# Patient Record
Sex: FEMALE | Race: BLACK OR AFRICAN AMERICAN | NOT HISPANIC OR LATINO | URBAN - METROPOLITAN AREA
[De-identification: names, ages, dates, MRNs, and addresses within clinical notes are randomized per-mention and may not be internally consistent; named-entity substitution may affect disease eponyms.]

---

## 2018-08-21 ENCOUNTER — EMERGENCY (EMERGENCY)
Facility: HOSPITAL | Age: 73
LOS: 1 days | Discharge: HOME | End: 2018-08-21
Attending: EMERGENCY MEDICINE | Admitting: EMERGENCY MEDICINE
Payer: MEDICAID

## 2018-08-21 VITALS
SYSTOLIC BLOOD PRESSURE: 195 MMHG | DIASTOLIC BLOOD PRESSURE: 87 MMHG | OXYGEN SATURATION: 98 % | WEIGHT: 123.24 LBS | RESPIRATION RATE: 16 BRPM | HEART RATE: 60 BPM | TEMPERATURE: 98 F

## 2018-08-21 LAB
ALBUMIN SERPL ELPH-MCNC: 4.4 G/DL — SIGNIFICANT CHANGE UP (ref 3.3–5)
ALP SERPL-CCNC: 79 U/L — SIGNIFICANT CHANGE UP (ref 40–120)
ALT FLD-CCNC: 8 U/L — LOW (ref 10–45)
ANION GAP SERPL CALC-SCNC: 14 MMOL/L — SIGNIFICANT CHANGE UP (ref 5–17)
AST SERPL-CCNC: 20 U/L — SIGNIFICANT CHANGE UP (ref 10–40)
BASOPHILS NFR BLD AUTO: 0.2 % — SIGNIFICANT CHANGE UP (ref 0–2)
BILIRUB SERPL-MCNC: 0.2 MG/DL — SIGNIFICANT CHANGE UP (ref 0.2–1.2)
BUN SERPL-MCNC: 39 MG/DL — HIGH (ref 7–23)
CALCIUM SERPL-MCNC: 10 MG/DL — SIGNIFICANT CHANGE UP (ref 8.4–10.5)
CHLORIDE SERPL-SCNC: 105 MMOL/L — SIGNIFICANT CHANGE UP (ref 96–108)
CK MB CFR SERPL CALC: 2 NG/ML — SIGNIFICANT CHANGE UP (ref 0–6.7)
CK SERPL-CCNC: 126 U/L — SIGNIFICANT CHANGE UP (ref 25–170)
CO2 SERPL-SCNC: 22 MMOL/L — SIGNIFICANT CHANGE UP (ref 22–31)
CREAT SERPL-MCNC: 1.26 MG/DL — SIGNIFICANT CHANGE UP (ref 0.5–1.3)
EOSINOPHIL NFR BLD AUTO: 0.2 % — SIGNIFICANT CHANGE UP (ref 0–6)
GLUCOSE SERPL-MCNC: 133 MG/DL — HIGH (ref 70–99)
HCT VFR BLD CALC: 32 % — LOW (ref 34.5–45)
HGB BLD-MCNC: 10.2 G/DL — LOW (ref 11.5–15.5)
LYMPHOCYTES # BLD AUTO: 11.4 % — LOW (ref 13–44)
MCHC RBC-ENTMCNC: 28.4 PG — SIGNIFICANT CHANGE UP (ref 27–34)
MCHC RBC-ENTMCNC: 31.9 G/DL — LOW (ref 32–36)
MCV RBC AUTO: 89.1 FL — SIGNIFICANT CHANGE UP (ref 80–100)
MONOCYTES NFR BLD AUTO: 4.2 % — SIGNIFICANT CHANGE UP (ref 2–14)
NEUTROPHILS NFR BLD AUTO: 84 % — HIGH (ref 43–77)
PLATELET # BLD AUTO: 226 K/UL — SIGNIFICANT CHANGE UP (ref 150–400)
POTASSIUM SERPL-MCNC: 4.3 MMOL/L — SIGNIFICANT CHANGE UP (ref 3.5–5.3)
POTASSIUM SERPL-SCNC: 4.3 MMOL/L — SIGNIFICANT CHANGE UP (ref 3.5–5.3)
PROT SERPL-MCNC: 8.2 G/DL — SIGNIFICANT CHANGE UP (ref 6–8.3)
RBC # BLD: 3.59 M/UL — LOW (ref 3.8–5.2)
RBC # FLD: 13.5 % — SIGNIFICANT CHANGE UP (ref 10.3–16.9)
SODIUM SERPL-SCNC: 141 MMOL/L — SIGNIFICANT CHANGE UP (ref 135–145)
TROPONIN T SERPL-MCNC: <0.01 NG/ML — SIGNIFICANT CHANGE UP (ref 0–0.01)
WBC # BLD: 6.4 K/UL — SIGNIFICANT CHANGE UP (ref 3.8–10.5)
WBC # FLD AUTO: 6.4 K/UL — SIGNIFICANT CHANGE UP (ref 3.8–10.5)

## 2018-08-21 PROCEDURE — 99284 EMERGENCY DEPT VISIT MOD MDM: CPT | Mod: GC,25

## 2018-08-21 PROCEDURE — 93010 ELECTROCARDIOGRAM REPORT: CPT

## 2018-08-21 NOTE — ED ADULT NURSE NOTE - OBJECTIVE STATEMENT
pt recewived into [facility] arrives via walk in triage with family A&Ox3 ambulatory appear comfortable complaining of nausea head pain and left knee pain s/p syncopal episode this afternoon around 4pm pt was in the kitchen and fell forward hit the front of her head does not remember whole event but cannot say for sure the lost consciousness. Family member states she heard the thud and found patient on the floor. Pt denies CP SOB at this time but reports nausea with episode fo vomiting prior to event. 12 lead EKG done NSR noted to continuous cardiac monitor respirations appear even and unlabored 20G placed to LAC labs drawn and sent will monitor and reassess, pt in NAD awaiting Md farmer informed and agreeable to plan

## 2018-08-21 NOTE — ED ADULT TRIAGE NOTE - ARRIVAL INFO ADDITIONAL COMMENTS
Pt c/o a syncopal episode and emesis. Pt c/o left elbow pain, left knee pain, and left forehead pain after fall. Denies anticoags and was found by family member face down in her kitchen. Pt states she had a pain in her stomach prior to waking up on her kitchen floor. She denies chest pain. Pt verbalized she recently was started on Losartan 50mg daily and today was the second time she had taken the medication. Pt states the first time she took it she felt diaphoretic and light headed.

## 2018-08-22 VITALS
HEART RATE: 65 BPM | OXYGEN SATURATION: 98 % | SYSTOLIC BLOOD PRESSURE: 159 MMHG | RESPIRATION RATE: 16 BRPM | TEMPERATURE: 98 F | DIASTOLIC BLOOD PRESSURE: 76 MMHG

## 2018-08-22 DIAGNOSIS — Z90.10 ACQUIRED ABSENCE OF UNSPECIFIED BREAST AND NIPPLE: Chronic | ICD-10-CM

## 2018-08-22 PROCEDURE — 93005 ELECTROCARDIOGRAM TRACING: CPT

## 2018-08-22 PROCEDURE — 84484 ASSAY OF TROPONIN QUANT: CPT

## 2018-08-22 PROCEDURE — 85025 COMPLETE CBC W/AUTO DIFF WBC: CPT

## 2018-08-22 PROCEDURE — 80053 COMPREHEN METABOLIC PANEL: CPT

## 2018-08-22 PROCEDURE — 82550 ASSAY OF CK (CPK): CPT

## 2018-08-22 PROCEDURE — 82553 CREATINE MB FRACTION: CPT

## 2018-08-22 PROCEDURE — 70450 CT HEAD/BRAIN W/O DYE: CPT

## 2018-08-22 PROCEDURE — 99284 EMERGENCY DEPT VISIT MOD MDM: CPT | Mod: 25

## 2018-08-22 PROCEDURE — 36415 COLL VENOUS BLD VENIPUNCTURE: CPT

## 2018-08-22 PROCEDURE — 70450 CT HEAD/BRAIN W/O DYE: CPT | Mod: 26

## 2018-08-22 RX ORDER — GLIMEPIRIDE 1 MG
1 TABLET ORAL
Qty: 0 | Refills: 0 | COMMUNITY

## 2018-08-22 RX ORDER — METFORMIN HYDROCHLORIDE 850 MG/1
0 TABLET ORAL
Qty: 0 | Refills: 0 | COMMUNITY

## 2018-08-22 RX ORDER — SIMVASTATIN 20 MG/1
1 TABLET, FILM COATED ORAL
Qty: 0 | Refills: 0 | COMMUNITY

## 2018-08-22 RX ORDER — LOSARTAN POTASSIUM 100 MG/1
1 TABLET, FILM COATED ORAL
Qty: 0 | Refills: 0 | COMMUNITY

## 2018-08-22 RX ORDER — ACETAMINOPHEN 500 MG
650 TABLET ORAL ONCE
Qty: 0 | Refills: 0 | Status: COMPLETED | OUTPATIENT
Start: 2018-08-22 | End: 2018-08-22

## 2018-08-22 RX ORDER — LOSARTAN POTASSIUM 100 MG/1
0 TABLET, FILM COATED ORAL
Qty: 0 | Refills: 0 | COMMUNITY

## 2018-08-22 RX ADMIN — Medication 650 MILLIGRAM(S): at 00:57

## 2018-08-22 NOTE — ED PROVIDER NOTE - OBJECTIVE STATEMENT
72 yo female with hx of HTN, DM II, HLD who presents for evaluation of syncopal episode today at 4 PM. Patient states that she was in the kitchen when she had sudden onset of nausea and vomited once. Her next memory is of being on the floor and being told by her niece that she had lost consciousness and fallen. She had no seizure like activity, chest pain, difficulty breathing, palpitations or weakness associated with her fall. No post ictal state following her syncopal episode. Patient and family attribute her nausea and vomiting to leftover fish which she had for lunch. No hx of MI, stroke or TIA.

## 2018-08-22 NOTE — ED PROVIDER NOTE - PHYSICAL EXAMINATION
PHYSICAL EXAM:  Constitutional: NAD, well-groomed, well-developed  HEENT: PERRLA, EOMI, Normal Hearing, MMM. Dentures in place  Neck: No LAD, No JVD. No audible carotid bruit  Back: Normal spine flexure, No CVA tenderness  Respiratory: CTAB  Cardiovascular: S1 and S2, RRR, no M/G/R  Gastrointestinal: BS+, soft, NT/ND  Extremities: No peripheral edema  Vascular: 2+ peripheral pulses  Neurological: A/O x 3, CN 2-12 intact. Sensation intact throughout. Motor 5/5 proximal and distal upper extremities b/l. Motor 5/5 proximal and distal lower extremities b/l. Cerebellar testing wnl. Gait not assessed. Word recall intact. Naming intact. Follows complex commands appropriately. No asphasia.   Skin: No rashes

## 2018-08-22 NOTE — ED PROVIDER NOTE - ATTENDING CONTRIBUTION TO CARE
syncope episode preceeded by gi symptoms of nausea/ crampy abd pain.  gi symptoms occurred after eating old fish.  suspect enteritis.  now asymptomatic in ed.  ecg nsr.  labs wnl.  ct head done as she may have hit head and no acute pathology seen.  unlikely acs given neg troponin and normal ecg, lack of chest pain.  to f/u with pmd and return if symptoms worsen

## 2018-08-22 NOTE — ED PROVIDER NOTE - MEDICAL DECISION MAKING DETAILS
Discussed with patient that likely etiology of her syncopal episode was her GI discomfort and vomiting episode. No alarm signs on exam or history to point to cardiac or neurologic causes of her syncope. At this time patient's neurologic exam is intact and EKG w/ cardiac enzymes are wnl. Patient to have CT head given head injury during fall but anticipate that patient will likely be discharged home to follow up with her outpatient PCP.

## 2018-08-22 NOTE — ED PROVIDER NOTE - CARE PLAN
Principal Discharge DX:	Syncope, unspecified syncope type  Assessment and plan of treatment:	You presented to the emergency room after losing consciousness. This is most likely due to your vomiting and abdominal discomfort. You have improved since reaching the hospital. We performed a head CT to take xrays of your head and ensure there was no damage from your fall.  Secondary Diagnosis:	Essential hypertension Principal Discharge DX:	Syncope, unspecified syncope type  Assessment and plan of treatment:	You presented to the emergency room after losing consciousness. This is most likely due to your vomiting and abdominal discomfort. You have improved since reaching the hospital. We performed a head CT to take xrays of your head and ensure there was no damage from your fall. Please see your primary care doctor after you are discharged from the hospital.  Secondary Diagnosis:	Essential hypertension Principal Discharge DX:	Syncope, unspecified syncope type  Assessment and plan of treatment:	You presented to the emergency room after losing consciousness. This is most likely due to your vomiting and abdominal discomfort. You have improved since reaching the hospital. We performed a head CT to take xrays of your head and ensure there was no damage from your fall. No bleeding was seen on your head CT. Please see your primary care doctor after you are discharged from the hospital.  Secondary Diagnosis:	Essential hypertension

## 2018-08-22 NOTE — ED PROVIDER NOTE - PLAN OF CARE
You presented to the emergency room after losing consciousness. This is most likely due to your vomiting and abdominal discomfort. You have improved since reaching the hospital. We performed a head CT to take xrays of your head and ensure there was no damage from your fall. You presented to the emergency room after losing consciousness. This is most likely due to your vomiting and abdominal discomfort. You have improved since reaching the hospital. We performed a head CT to take xrays of your head and ensure there was no damage from your fall. Please see your primary care doctor after you are discharged from the hospital. You presented to the emergency room after losing consciousness. This is most likely due to your vomiting and abdominal discomfort. You have improved since reaching the hospital. We performed a head CT to take xrays of your head and ensure there was no damage from your fall. No bleeding was seen on your head CT. Please see your primary care doctor after you are discharged from the hospital.

## 2018-08-22 NOTE — ED PROVIDER NOTE - NS ED ROS FT
REVIEW OF SYSTEMS:    CONSTITUTIONAL: No weakness, fevers or chills  EYES/ENT: No visual changes;  No vertigo or throat pain   NECK: No pain or stiffness  RESPIRATORY: No cough, wheezing, hemoptysis; No shortness of breath  CARDIOVASCULAR: No chest pain or palpitations  GASTROINTESTINAL: + for nausea and NBNB vomiting x1. No abdominal or epigastric pain. No diarrhea or constipation. No melena or hematochezia.  GENITOURINARY: No dysuria, frequency or hematuria  NEUROLOGICAL: per HPI  SKIN: No itching, rashes

## 2018-08-25 DIAGNOSIS — E11.9 TYPE 2 DIABETES MELLITUS WITHOUT COMPLICATIONS: ICD-10-CM

## 2018-08-25 DIAGNOSIS — I10 ESSENTIAL (PRIMARY) HYPERTENSION: ICD-10-CM

## 2018-08-25 DIAGNOSIS — Z79.899 OTHER LONG TERM (CURRENT) DRUG THERAPY: ICD-10-CM

## 2018-08-25 DIAGNOSIS — E78.5 HYPERLIPIDEMIA, UNSPECIFIED: ICD-10-CM

## 2018-08-25 DIAGNOSIS — Z79.84 LONG TERM (CURRENT) USE OF ORAL HYPOGLYCEMIC DRUGS: ICD-10-CM

## 2018-08-25 DIAGNOSIS — R55 SYNCOPE AND COLLAPSE: ICD-10-CM

## 2020-04-14 ENCOUNTER — HOSPITAL ENCOUNTER (INPATIENT)
Facility: HOSPITAL | Age: 75
LOS: 8 days | Discharge: HOME HEALTH | DRG: 177 | End: 2020-04-22
Attending: EMERGENCY MEDICINE | Admitting: STUDENT IN AN ORGANIZED HEALTH CARE EDUCATION/TRAINING PROGRAM
Payer: MEDICARE

## 2020-04-14 ENCOUNTER — APPOINTMENT (OUTPATIENT)
Dept: CT IMAGING | Facility: HOSPITAL | Age: 75
DRG: 177 | End: 2020-04-14
Payer: MEDICARE

## 2020-04-14 ENCOUNTER — APPOINTMENT (OUTPATIENT)
Dept: RADIOLOGY | Facility: HOSPITAL | Age: 75
DRG: 177 | End: 2020-04-14
Payer: MEDICARE

## 2020-04-14 ENCOUNTER — APPOINTMENT (EMERGENCY)
Dept: CT IMAGING | Facility: HOSPITAL | Age: 75
DRG: 177 | End: 2020-04-14
Payer: MEDICARE

## 2020-04-14 DIAGNOSIS — K08.89 PAIN, DENTAL: ICD-10-CM

## 2020-04-14 DIAGNOSIS — U07.1 COVID-19 VIRUS DETECTED: ICD-10-CM

## 2020-04-14 DIAGNOSIS — R50.9 FEVER: ICD-10-CM

## 2020-04-14 DIAGNOSIS — K04.7 TOOTH ABSCESS: ICD-10-CM

## 2020-04-14 DIAGNOSIS — R41.82 ALTERED MENTAL STATUS: Primary | ICD-10-CM

## 2020-04-14 DIAGNOSIS — N17.9 AKI (ACUTE KIDNEY INJURY) (HCC): ICD-10-CM

## 2020-04-14 PROBLEM — E11.9 DM (DIABETES MELLITUS) (HCC): Status: ACTIVE | Noted: 2020-04-14

## 2020-04-14 PROBLEM — G93.40 ENCEPHALOPATHY: Status: ACTIVE | Noted: 2020-04-14

## 2020-04-14 PROBLEM — I10 HTN (HYPERTENSION): Status: ACTIVE | Noted: 2020-04-14

## 2020-04-14 PROBLEM — R93.89 ABNORMAL FINDING ON CT SCAN: Status: ACTIVE | Noted: 2020-04-14

## 2020-04-14 LAB
ANION GAP SERPL CALCULATED.3IONS-SCNC: 14 MMOL/L (ref 4–13)
BACTERIA UR QL AUTO: ABNORMAL /HPF
BASOPHILS # BLD AUTO: 0.01 THOUSANDS/ΜL (ref 0–0.1)
BASOPHILS NFR BLD AUTO: 0 % (ref 0–1)
BILIRUB UR QL STRIP: NEGATIVE
BUN SERPL-MCNC: 56 MG/DL (ref 5–25)
CALCIUM SERPL-MCNC: 8.8 MG/DL (ref 8.3–10.1)
CHLORIDE SERPL-SCNC: 103 MMOL/L (ref 100–108)
CLARITY UR: CLEAR
CO2 SERPL-SCNC: 22 MMOL/L (ref 21–32)
COLOR UR: YELLOW
CREAT SERPL-MCNC: 1.72 MG/DL (ref 0.6–1.3)
CRP SERPL QL: 59.9 MG/L
EOSINOPHIL # BLD AUTO: 0 THOUSAND/ΜL (ref 0–0.61)
EOSINOPHIL NFR BLD AUTO: 0 % (ref 0–6)
ERYTHROCYTE [DISTWIDTH] IN BLOOD BY AUTOMATED COUNT: 13.7 % (ref 11.6–15.1)
GFR SERPL CREATININE-BSD FRML MDRD: 34 ML/MIN/1.73SQ M
GLUCOSE SERPL-MCNC: 218 MG/DL (ref 65–140)
GLUCOSE UR STRIP-MCNC: NEGATIVE MG/DL
HCT VFR BLD AUTO: 34.9 % (ref 34.8–46.1)
HGB BLD-MCNC: 10.4 G/DL (ref 11.5–15.4)
HGB UR QL STRIP.AUTO: ABNORMAL
IMM GRANULOCYTES # BLD AUTO: 0.02 THOUSAND/UL (ref 0–0.2)
IMM GRANULOCYTES NFR BLD AUTO: 0 % (ref 0–2)
KETONES UR STRIP-MCNC: NEGATIVE MG/DL
LACTATE SERPL-SCNC: 1.2 MMOL/L (ref 0.5–2)
LEUKOCYTE ESTERASE UR QL STRIP: NEGATIVE
LYMPHOCYTES # BLD AUTO: 0.76 THOUSANDS/ΜL (ref 0.6–4.47)
LYMPHOCYTES NFR BLD AUTO: 15 % (ref 14–44)
MCH RBC QN AUTO: 27.8 PG (ref 26.8–34.3)
MCHC RBC AUTO-ENTMCNC: 29.8 G/DL (ref 31.4–37.4)
MCV RBC AUTO: 93 FL (ref 82–98)
MONOCYTES # BLD AUTO: 0.33 THOUSAND/ΜL (ref 0.17–1.22)
MONOCYTES NFR BLD AUTO: 6 % (ref 4–12)
NEUTROPHILS # BLD AUTO: 4.01 THOUSANDS/ΜL (ref 1.85–7.62)
NEUTS SEG NFR BLD AUTO: 79 % (ref 43–75)
NITRITE UR QL STRIP: NEGATIVE
NON-SQ EPI CELLS URNS QL MICRO: ABNORMAL /HPF
NRBC BLD AUTO-RTO: 0 /100 WBCS
PH UR STRIP.AUTO: 5.5 [PH]
PLATELET # BLD AUTO: 168 THOUSANDS/UL (ref 149–390)
PMV BLD AUTO: 11.2 FL (ref 8.9–12.7)
POTASSIUM SERPL-SCNC: 5.3 MMOL/L (ref 3.5–5.3)
PROT UR STRIP-MCNC: ABNORMAL MG/DL
RBC # BLD AUTO: 3.74 MILLION/UL (ref 3.81–5.12)
RBC #/AREA URNS AUTO: ABNORMAL /HPF
SODIUM SERPL-SCNC: 139 MMOL/L (ref 136–145)
SP GR UR STRIP.AUTO: >=1.03 (ref 1–1.03)
UROBILINOGEN UR QL STRIP.AUTO: 0.2 E.U./DL
WBC # BLD AUTO: 5.13 THOUSAND/UL (ref 4.31–10.16)
WBC #/AREA URNS AUTO: ABNORMAL /HPF

## 2020-04-14 PROCEDURE — 82728 ASSAY OF FERRITIN: CPT | Performed by: STUDENT IN AN ORGANIZED HEALTH CARE EDUCATION/TRAINING PROGRAM

## 2020-04-14 PROCEDURE — 96365 THER/PROPH/DIAG IV INF INIT: CPT

## 2020-04-14 PROCEDURE — 96361 HYDRATE IV INFUSION ADD-ON: CPT

## 2020-04-14 PROCEDURE — 83605 ASSAY OF LACTIC ACID: CPT | Performed by: PHYSICIAN ASSISTANT

## 2020-04-14 PROCEDURE — 99285 EMERGENCY DEPT VISIT HI MDM: CPT | Performed by: PHYSICIAN ASSISTANT

## 2020-04-14 PROCEDURE — 84145 PROCALCITONIN (PCT): CPT | Performed by: STUDENT IN AN ORGANIZED HEALTH CARE EDUCATION/TRAINING PROGRAM

## 2020-04-14 PROCEDURE — 99285 EMERGENCY DEPT VISIT HI MDM: CPT

## 2020-04-14 PROCEDURE — 36415 COLL VENOUS BLD VENIPUNCTURE: CPT | Performed by: PHYSICIAN ASSISTANT

## 2020-04-14 PROCEDURE — 99223 1ST HOSP IP/OBS HIGH 75: CPT | Performed by: STUDENT IN AN ORGANIZED HEALTH CARE EDUCATION/TRAINING PROGRAM

## 2020-04-14 PROCEDURE — 87040 BLOOD CULTURE FOR BACTERIA: CPT | Performed by: PHYSICIAN ASSISTANT

## 2020-04-14 PROCEDURE — 81001 URINALYSIS AUTO W/SCOPE: CPT | Performed by: PHYSICIAN ASSISTANT

## 2020-04-14 PROCEDURE — 70486 CT MAXILLOFACIAL W/O DYE: CPT

## 2020-04-14 PROCEDURE — 86140 C-REACTIVE PROTEIN: CPT | Performed by: STUDENT IN AN ORGANIZED HEALTH CARE EDUCATION/TRAINING PROGRAM

## 2020-04-14 PROCEDURE — 70450 CT HEAD/BRAIN W/O DYE: CPT

## 2020-04-14 PROCEDURE — 85025 COMPLETE CBC W/AUTO DIFF WBC: CPT | Performed by: PHYSICIAN ASSISTANT

## 2020-04-14 PROCEDURE — 71045 X-RAY EXAM CHEST 1 VIEW: CPT

## 2020-04-14 PROCEDURE — 80048 BASIC METABOLIC PNL TOTAL CA: CPT | Performed by: PHYSICIAN ASSISTANT

## 2020-04-14 PROCEDURE — 74176 CT ABD & PELVIS W/O CONTRAST: CPT

## 2020-04-14 PROCEDURE — 71250 CT THORAX DX C-: CPT

## 2020-04-14 RX ORDER — ACETAMINOPHEN 650 MG/1
325 SUPPOSITORY RECTAL EVERY 4 HOURS PRN
Status: DISCONTINUED | OUTPATIENT
Start: 2020-04-14 | End: 2020-04-15

## 2020-04-14 RX ORDER — ACETAMINOPHEN 160 MG/5ML
650 SUSPENSION, ORAL (FINAL DOSE FORM) ORAL ONCE
Status: DISCONTINUED | OUTPATIENT
Start: 2020-04-14 | End: 2020-04-16

## 2020-04-14 RX ORDER — CLINDAMYCIN PHOSPHATE 600 MG/50ML
600 INJECTION INTRAVENOUS ONCE
Status: COMPLETED | OUTPATIENT
Start: 2020-04-14 | End: 2020-04-14

## 2020-04-14 RX ORDER — SODIUM CHLORIDE 9 MG/ML
75 INJECTION, SOLUTION INTRAVENOUS CONTINUOUS
Status: DISCONTINUED | OUTPATIENT
Start: 2020-04-14 | End: 2020-04-17

## 2020-04-14 RX ADMIN — ACETAMINOPHEN 325 MG: 650 SUPPOSITORY RECTAL at 21:38

## 2020-04-14 RX ADMIN — SODIUM CHLORIDE 1000 ML: 0.9 INJECTION, SOLUTION INTRAVENOUS at 11:27

## 2020-04-14 RX ADMIN — SODIUM CHLORIDE 100 ML/HR: 0.9 INJECTION, SOLUTION INTRAVENOUS at 23:29

## 2020-04-14 RX ADMIN — SODIUM CHLORIDE 500 ML: 0.9 INJECTION, SOLUTION INTRAVENOUS at 17:49

## 2020-04-14 RX ADMIN — CLINDAMYCIN PHOSPHATE 600 MG: 600 INJECTION, SOLUTION INTRAVENOUS at 11:27

## 2020-04-14 RX ADMIN — ACETAMINOPHEN 325 MG: 325 SUPPOSITORY RECTAL at 17:44

## 2020-04-14 RX ADMIN — CEFEPIME HYDROCHLORIDE 2000 MG: 2 INJECTION, POWDER, FOR SOLUTION INTRAVENOUS at 23:29

## 2020-04-14 NOTE — ED NOTES
1  CC: poor nutritional intake   2  Orientation status: Disoriented X3  3  Abnormal labs/vitals/assessment: fever; BUN 56; Creatinine 1 72  4  Iv/drains/etc : 20 G left AC   5  Last time narcotics given: none   6  Medications/drips: NSS   7  Ambulation status: assist x2   8  Isolation status: none   9  Skin: not fully assessed   10  Trauma: none   11   ED phone number: [phone], RN  04/14/20 Marcela Rodas

## 2020-04-14 NOTE — ED PROVIDER NOTES
History  Chief Complaint   Patient presents with    Poor Nutritional Intake     PT C/O DENTAL PAIN AND THROAT PAIN BUT HAS NOT BEEN EATING OR DRINKING FOR THE PAST COUPLE DAYS  FAMILY MEMBER IS CONCERNED ABOUT DEHYDRATION  AMBULATORY PULSE OX 93-94%     Manuel Barksdale is a 80yo female presenting to the ED with daughter for evaluation of worsening dental pain and poor PO intake x 3 days  PMHx significant for type II DM and anemia  Patient's daughter relates the patient lives in the [place] but has been living with her in her home for the past month in light of the COVID 19 pandemic  Patient's daughter states that she noticed the patient has been complaining of right-sided dental pain and difficulty swallowing  She has been reporting trouble with swallowing her medications and has only been drinking tea for the past few days  She reports subjective fevers noting that the patient has intermittently felt warm to the touch but did not formally record her temperatures  Patient denies any recent dental trauma  Daughter notes that the patient has also been seeming more confused from her baseline, frequently asking her what day it is when ordinarily the patient would be alert and oriented x 3  Daughter also notes that the patient is typically very independent, living by herself in the [place], but has required more help with ambulating and toileting over the past couple of days  Denies recent antibiotic use or medication change  Patient denies other complaints including abdominal pain, N/V, chest pain or shortness of breath, recent cold symptoms, rash  She denies feeling weak at this time        History provided by:  Patient (daughter)  History limited by:  Mental status change  Dental Pain   Location:  Upper  Upper teeth location:  7/RU lateral incisor  Severity:  Moderate  Onset quality:  Gradual  Timing:  Intermittent  Chronicity:  New  Context: poor dentition    Context: not dental fracture    Relieved by:  None tried  Worsened by:  Touching  Ineffective treatments:  None tried  Associated symptoms: fever    Associated symptoms: no headaches and no neck pain        None       Past Medical History:   Diagnosis Date    Cancer ([facility] [place] [facility] )     BREAST    Diabetes mellitus ([facility] [place] [facility] )     Hypertension        Past Surgical History:   Procedure Laterality Date    BREAST SURGERY         History reviewed  No pertinent family history  I have reviewed and agree with the history as documented  E-Cigarette/Vaping    E-Cigarette Use Never User      E-Cigarette/Vaping Substances     Social History     Tobacco Use    Smoking status: Never Smoker    Smokeless tobacco: Never Used   Substance Use Topics    Alcohol use: Not Currently    Drug use: Never       Review of Systems   Constitutional: Positive for activity change, appetite change, chills and fever  HENT: Positive for dental problem (Dental pain) and trouble swallowing  Eyes: Negative for visual disturbance  Respiratory: Negative for cough and shortness of breath  Cardiovascular: Negative for chest pain  Gastrointestinal: Negative for abdominal pain, diarrhea, nausea and vomiting  Musculoskeletal: Negative for neck pain and neck stiffness  Skin: Negative for pallor and rash  Neurological: Negative for weakness, numbness and headaches  All other systems reviewed and are negative  Physical Exam  Physical Exam   Constitutional: She appears well-developed  No distress  Frail  Appears very weak   HENT:   Head: Normocephalic and atraumatic  Mouth/Throat: Mucous membranes are dry  No posterior oropharyngeal edema or posterior oropharyngeal erythema  Poor dentition  Pt's right maxillary lateral incisor with reproducible pain upon palpation  Tooth does not feel markedly loose in the socket  Mild edema surrounding the tooth with no appreciable erythema or purulent drainage   Mild edema of the skin overlying the tooth with no erythema/warmth to suggest facial cellulitis  No tenderness to palpation of the zygomatic bones or mandible  Patient able to open/close mouth and swallow, tolerating oral secretions   Eyes: Left pupil is round and reactive  Left pupil round and reactive to light  Right cataract   Neck: Neck supple  Cardiovascular: Normal rate, regular rhythm, normal heart sounds and intact distal pulses  Pulmonary/Chest: Effort normal and breath sounds normal  No respiratory distress  She has no wheezes  She has no rales  Abdominal: Soft  Bowel sounds are normal  She exhibits no distension  There is no tenderness  There is no guarding  Musculoskeletal: Normal range of motion  Neurological: No sensory deficit  Patient awake, oriented to self, not oriented to place or time  Following commands  Speech slowed and delayed  Able to move all extremities purposefully with symmetric strength  Skin: Skin is warm and dry  Capillary refill takes less than 2 seconds  No rash noted  No pallor  Nursing note and vitals reviewed        Vital Signs  ED Triage Vitals   Temperature Pulse Respirations Blood Pressure SpO2   04/14/20 1018 04/14/20 1018 04/14/20 1018 04/14/20 1018 04/14/20 1018   (!) 97 1 °F (36 2 °C) 88 15 115/59 94 %      Temp Source Heart Rate Source Patient Position - Orthostatic VS BP Location FiO2 (%)   04/14/20 1018 04/14/20 1200 04/14/20 1018 04/14/20 1018 --   Temporal Monitor Sitting Right arm       Pain Score       --                  Vitals:    04/14/20 1018 04/14/20 1200 04/14/20 1511 04/14/20 1750   BP: 115/59 144/64 141/72 162/75   Pulse: 88 81 82 87   Patient Position - Orthostatic VS: Sitting            Visual Acuity      ED Medications  Medications   acetaminophen (TYLENOL) oral suspension 650 mg (650 mg Oral Not Given 4/14/20 1648)   sodium chloride 0 9 % bolus 500 mL (500 mL Intravenous New Bag 4/14/20 1749)   sodium chloride 0 9 % bolus 1,000 mL (0 mL Intravenous Stopped 4/14/20 1512)   clindamycin (CLEOCIN) IVPB (premix) 600 mg (0 mg Intravenous Stopped 4/14/20 1231)   acetaminophen (TYLENOL) rectal suppository 325 mg (325 mg Rectal Given 4/14/20 1744)       Diagnostic Studies  Results Reviewed     Procedure Component Value Units Date/Time    Urine Microscopic [[id number]]  (Abnormal) Collected:  04/14/20 1505    Lab Status:  Final result Specimen:  Urine, Clean Catch Updated:  04/14/20 1558     RBC, UA 0-1 /hpf      WBC, UA None Seen /hpf      Epithelial Cells Occasional /hpf      Bacteria, UA Occasional /hpf     UA w Reflex to Microscopic w Reflex to Culture [[id number]]  (Abnormal) Collected:  04/14/20 1505    Lab Status:  Final result Specimen:  Urine, Clean Catch Updated:  04/14/20 1523     Color, UA Yellow     Clarity, UA Clear     Specific Gravity, UA >=1 030     pH, UA 5 5     Leukocytes, UA Negative     Nitrite, UA Negative     Protein, UA Trace mg/dl      Glucose, UA Negative mg/dl      Ketones, UA Negative mg/dl      Urobilinogen, UA 0 2 E U /dl      Bilirubin, UA Negative     Blood, UA Trace-Intact    Lactic acid, plasma [[id number]]  (Normal) Collected:  04/14/20 1124    Lab Status:  Final result Specimen:  Blood from Arm, Left Updated:  04/14/20 1148     LACTIC ACID 1 2 mmol/L     Narrative:       Result may be elevated if tourniquet was used during collection      Basic metabolic panel [[id number]]  (Abnormal) Collected:  04/14/20 1118    Lab Status:  Final result Specimen:  Blood from Arm, Right Updated:  04/14/20 1144     Sodium 139 mmol/L      Potassium 5 3 mmol/L      Chloride 103 mmol/L      CO2 22 mmol/L      ANION GAP 14 mmol/L      BUN 56 mg/dL      Creatinine 1 72 mg/dL      Glucose 218 mg/dL      Calcium 8 8 mg/dL      eGFR 34 ml/min/1 73sq m     Narrative:       [place] guidelines for Chronic Kidney Disease (CKD):     Stage 1 with normal or high GFR (GFR > 90 mL/min/1 73 square meters)    Stage 2 Mild CKD (GFR = 60-89 mL/min/1 73 square meters)    Stage 3A Moderate CKD (GFR = 45-59 mL/min/1 73 square meters)    Stage 3B Moderate CKD (GFR = 30-44 mL/min/1 73 square meters)    Stage 4 Severe CKD (GFR = 15-29 mL/min/1 73 square meters)    Stage 5 End Stage CKD (GFR <15 mL/min/1 73 square meters)  Note: GFR calculation is accurate only with a steady state creatinine    Blood culture #1 [[id number]] Collected:  04/14/20 1124    Lab Status: In process Specimen:  Blood from Arm, Left Updated:  04/14/20 1126    CBC and differential [[id number]]  (Abnormal) Collected:  04/14/20 1118    Lab Status:  Final result Specimen:  Blood from Arm, Right Updated:  04/14/20 1123     WBC 5 13 Thousand/uL      RBC 3 74 Million/uL      Hemoglobin 10 4 g/dL      Hematocrit 34 9 %      MCV 93 fL      MCH 27 8 pg      MCHC 29 8 g/dL      RDW 13 7 %      MPV 11 2 fL      Platelets 706 Thousands/uL      nRBC 0 /100 WBCs      Neutrophils Relative 79 %      Immat GRANS % 0 %      Lymphocytes Relative 15 %      Monocytes Relative 6 %      Eosinophils Relative 0 %      Basophils Relative 0 %      Neutrophils Absolute 4 01 Thousands/µL      Immature Grans Absolute 0 02 Thousand/uL      Lymphocytes Absolute 0 76 Thousands/µL      Monocytes Absolute 0 33 Thousand/µL      Eosinophils Absolute 0 00 Thousand/µL      Basophils Absolute 0 01 Thousands/µL     Blood culture #2 [[id number]] Collected:  04/14/20 1118    Lab Status: In process Specimen:  Blood from Arm, Right Updated:  04/14/20 1121                 CT head without contrast   Final Result by Jeovany Fisher MD (04/14 1727)      Curvilinear increased hyperdensity within the right orbital globe could relate to intraorbital hematoma or retinal detachment, correlate clinically and ophthalmology consult  Microangiopathy  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  Tiny low-density within the left basal ganglia age-indeterminate ischemia  If there is focal neurologic deficit, consider proceeding with noncontrast brain MRI for further evaluation                    Workstation performed: [id number]                    Procedures  Procedures         ED Course     ED Course as of Apr 14 1829   Tue Apr 14, 2020   1100 Patient evaluated, care discussed with attending physician  Orders placed  Will likely plan for admission given patient's degree of generalized weakness   Patient reassessed, resting comfortably  Daughter advised of all lab findings thus far       [id number] Patient reassessed, still appears weak but states that she is feeling better  Discussed recommendation for admission with patient and daughter given patient's degree of weakness  Patient's daughter comfortable and agreeable at this time  Awaiting urine sample      1633 Tylenol ordered  UA reviewed with no evidence of UTI  Temperature(!): 100 9 °F (38 3 °C)   1722 Case discussed with Dr Rinku Pelayo, will accept admission to [facility]  Bridging orders placed  Spoke with patient's son on the phone regarding admission plan for which he is comfortable and agreeable  All questions answered and concerns addressed  Will order CT head prior to SLIM admission            Identification of Seniors at Risk      Most Recent Value   (ISAR) Identification of Seniors at Risk   Before the illness or injury that brought you to the Emergency, did you need someone to help you on a regular basis? 1 Filed at: 04/14/2020 1020   In the last 24 hours, have you needed more help than usual?  1 Filed at: 04/14/2020 1020   Have you been hospitalized for one or more nights during the past 6 months? 0 Filed at: 04/14/2020 1020   In general, do you see well?  0 Filed at: 04/14/2020 1020   In general, do you have serious problems with your memory? 0 Filed at: 04/14/2020 1020   Do you take more than three different medications every day?   1 Filed at: 04/14/2020 1020   ISAR Score  3 Filed at: 04/14/2020 1020                  MDM  Number of Diagnoses or Management Options  JULEE (acute kidney injury) ([facility] [address] ): new and requires workup  Altered mental status: new and requires workup  Fever: new and does not require workup  Pain, dental: new and does not require workup  Diagnosis management comments: Differential diagnosis includes but not limited to: dental pain, dental abscess, pharyngitis, dehydration, electrolyte disturbance, hypoglycemia, UTI  Blood cultures drawn to rule out bacteremia       Amount and/or Complexity of Data Reviewed  Clinical lab tests: ordered and reviewed  Tests in the radiology section of CPT®: ordered and reviewed  Obtain history from someone other than the patient: yes  Review and summarize past medical records: yes  Discuss the patient with other providers: yes    Risk of Complications, Morbidity, and/or Mortality  Presenting problems: moderate  Diagnostic procedures: moderate  Management options: moderate  General comments: Patient is a 78yo female with history of DM and anemia presenting to the ED with daughter for evaluation of dental pain, poor PO intake and subsequent mental status change over the past three days  Initial vitals within normal limits; however, patient was found to have a rectal temp of 100 9 for which Tylenol was given  On examination, patient with exquisite tenderness to the right lateral incisor with minimal edema of the skin overlying the painful tooth  She was given one dose of IV Clindamycin in ED with clinical improvement  Her neuro exam revealed that the patient was not oriented to place or time and her speech was slowed, which was a change from her baseline per daughter  Labs remarkable for JULEE  No leukocytosis or elevated lactate  Urinalysis did not show UTI  Patient persisted with altered mental status and weakness warranting medical admission which was agreed upon by patient and family  All questions answered and concerns addressed  Case discussed with Dr Linda Chapman who was agreeable with admission  Bridging orders placed, Stable for admission      Patient Progress  Patient progress: stable        Disposition  Final diagnoses: Altered mental status   Pain, dental   JULEE (acute kidney injury) ([facility] [address] )   Fever     Time reflects when diagnosis was documented in both MDM as applicable and the Disposition within this note     Time User Action Codes Description Comment    4/14/2020  5:05 PM Sheryle Najjar Add [R41 82] Altered mental status     4/14/2020  5:06 PM Sheryle Najjar Add [K08 89] Pain, dental     4/14/2020  5:06 PM Sheryle Najjar Add [N17 9] JULEE (acute kidney injury) ([facility] [address] )     4/14/2020  5:06 PM Sheryle Najjar Add [R50 9] Fever       ED Disposition     ED Disposition Condition Date/Time Comment    Admit Stable Tue Apr 14, 2020  5:06 PM Case was discussed with Dr Rinku Pelayo and the patient's admission status was agreed to be Admission Status: observation status to the service of Dr Rinku Pelayo   Follow-up Information    None         Patient's Medications    No medications on file     No discharge procedures on file      PDMP Review     None          ED Provider  Electronically Signed by           Chandni Young PA-C  04/15/20 0745

## 2020-04-15 LAB
ALBUMIN SERPL BCP-MCNC: 2.7 G/DL (ref 3.5–5)
ALP SERPL-CCNC: 57 U/L (ref 46–116)
ALT SERPL W P-5'-P-CCNC: 13 U/L (ref 12–78)
ANION GAP SERPL CALCULATED.3IONS-SCNC: 12 MMOL/L (ref 4–13)
AST SERPL W P-5'-P-CCNC: 21 U/L (ref 5–45)
BASOPHILS # BLD MANUAL: 0 THOUSAND/UL (ref 0–0.1)
BASOPHILS NFR MAR MANUAL: 0 % (ref 0–1)
BILIRUB SERPL-MCNC: 0.3 MG/DL (ref 0.2–1)
BUN SERPL-MCNC: 36 MG/DL (ref 5–25)
CALCIUM SERPL-MCNC: 8.3 MG/DL (ref 8.3–10.1)
CHLORIDE SERPL-SCNC: 108 MMOL/L (ref 100–108)
CHOLEST SERPL-MCNC: 182 MG/DL (ref 50–200)
CO2 SERPL-SCNC: 21 MMOL/L (ref 21–32)
CREAT SERPL-MCNC: 1.3 MG/DL (ref 0.6–1.3)
EOSINOPHIL # BLD MANUAL: 0 THOUSAND/UL (ref 0–0.4)
EOSINOPHIL NFR BLD MANUAL: 0 % (ref 0–6)
ERYTHROCYTE [DISTWIDTH] IN BLOOD BY AUTOMATED COUNT: 13.8 % (ref 11.6–15.1)
EST. AVERAGE GLUCOSE BLD GHB EST-MCNC: 192 MG/DL
FERRITIN SERPL-MCNC: 285 NG/ML (ref 8–388)
GFR SERPL CREATININE-BSD FRML MDRD: 47 ML/MIN/1.73SQ M
GLUCOSE SERPL-MCNC: 183 MG/DL (ref 65–140)
GLUCOSE SERPL-MCNC: 210 MG/DL (ref 65–140)
HBA1C MFR BLD: 8.3 %
HCT VFR BLD AUTO: 31.4 % (ref 34.8–46.1)
HDLC SERPL-MCNC: 51 MG/DL
HGB BLD-MCNC: 9.5 G/DL (ref 11.5–15.4)
LDLC SERPL CALC-MCNC: 109 MG/DL (ref 0–100)
LYMPHOCYTES # BLD AUTO: 1.3 THOUSAND/UL (ref 0.6–4.47)
LYMPHOCYTES # BLD AUTO: 19 % (ref 14–44)
MCH RBC QN AUTO: 28.3 PG (ref 26.8–34.3)
MCHC RBC AUTO-ENTMCNC: 30.3 G/DL (ref 31.4–37.4)
MCV RBC AUTO: 94 FL (ref 82–98)
MONOCYTES # BLD AUTO: 0.27 THOUSAND/UL (ref 0–1.22)
MONOCYTES NFR BLD: 4 % (ref 4–12)
NEUTROPHILS # BLD MANUAL: 5.27 THOUSAND/UL (ref 1.85–7.62)
NEUTS BAND NFR BLD MANUAL: 3 % (ref 0–8)
NEUTS SEG NFR BLD AUTO: 74 % (ref 43–75)
NONHDLC SERPL-MCNC: 131 MG/DL
NRBC BLD AUTO-RTO: 0 /100 WBCS
PLATELET # BLD AUTO: 174 THOUSANDS/UL (ref 149–390)
PLATELET BLD QL SMEAR: ADEQUATE
PMV BLD AUTO: 10.5 FL (ref 8.9–12.7)
POTASSIUM SERPL-SCNC: 4.4 MMOL/L (ref 3.5–5.3)
PROCALCITONIN SERPL-MCNC: 0.46 NG/ML
PROT SERPL-MCNC: 7.3 G/DL (ref 6.4–8.2)
RBC # BLD AUTO: 3.36 MILLION/UL (ref 3.81–5.12)
RBC MORPH BLD: NORMAL
SARS-COV-2 RNA RESP QL NAA+PROBE: POSITIVE
SODIUM SERPL-SCNC: 141 MMOL/L (ref 136–145)
TOTAL CELLS COUNTED SPEC: 100
TRIGL SERPL-MCNC: 109 MG/DL
WBC # BLD AUTO: 6.84 THOUSAND/UL (ref 4.31–10.16)

## 2020-04-15 PROCEDURE — 85007 BL SMEAR W/DIFF WBC COUNT: CPT | Performed by: STUDENT IN AN ORGANIZED HEALTH CARE EDUCATION/TRAINING PROGRAM

## 2020-04-15 PROCEDURE — 80061 LIPID PANEL: CPT | Performed by: STUDENT IN AN ORGANIZED HEALTH CARE EDUCATION/TRAINING PROGRAM

## 2020-04-15 PROCEDURE — 87635 SARS-COV-2 COVID-19 AMP PRB: CPT | Performed by: STUDENT IN AN ORGANIZED HEALTH CARE EDUCATION/TRAINING PROGRAM

## 2020-04-15 PROCEDURE — 80053 COMPREHEN METABOLIC PANEL: CPT | Performed by: STUDENT IN AN ORGANIZED HEALTH CARE EDUCATION/TRAINING PROGRAM

## 2020-04-15 PROCEDURE — 82948 REAGENT STRIP/BLOOD GLUCOSE: CPT

## 2020-04-15 PROCEDURE — 85027 COMPLETE CBC AUTOMATED: CPT | Performed by: STUDENT IN AN ORGANIZED HEALTH CARE EDUCATION/TRAINING PROGRAM

## 2020-04-15 PROCEDURE — 99232 SBSQ HOSP IP/OBS MODERATE 35: CPT | Performed by: STUDENT IN AN ORGANIZED HEALTH CARE EDUCATION/TRAINING PROGRAM

## 2020-04-15 PROCEDURE — 99223 1ST HOSP IP/OBS HIGH 75: CPT | Performed by: INTERNAL MEDICINE

## 2020-04-15 PROCEDURE — 93005 ELECTROCARDIOGRAM TRACING: CPT

## 2020-04-15 PROCEDURE — 83036 HEMOGLOBIN GLYCOSYLATED A1C: CPT | Performed by: STUDENT IN AN ORGANIZED HEALTH CARE EDUCATION/TRAINING PROGRAM

## 2020-04-15 RX ORDER — HYDROXYCHLOROQUINE SULFATE 200 MG/1
400 TABLET, FILM COATED ORAL EVERY 12 HOURS
Status: DISCONTINUED | OUTPATIENT
Start: 2020-04-15 | End: 2020-04-16

## 2020-04-15 RX ORDER — ACETAMINOPHEN 325 MG/1
650 TABLET ORAL EVERY 6 HOURS PRN
Status: DISCONTINUED | OUTPATIENT
Start: 2020-04-15 | End: 2020-04-16

## 2020-04-15 RX ORDER — LORAZEPAM 2 MG/ML
0.5 INJECTION INTRAMUSCULAR ONCE
Status: COMPLETED | OUTPATIENT
Start: 2020-04-15 | End: 2020-04-15

## 2020-04-15 RX ORDER — ACETAMINOPHEN 650 MG/1
650 SUPPOSITORY RECTAL EVERY 4 HOURS PRN
Status: DISCONTINUED | OUTPATIENT
Start: 2020-04-15 | End: 2020-04-15

## 2020-04-15 RX ORDER — AZITHROMYCIN 250 MG/1
250 TABLET, FILM COATED ORAL EVERY 24 HOURS
Status: COMPLETED | OUTPATIENT
Start: 2020-04-16 | End: 2020-04-19

## 2020-04-15 RX ORDER — ASCORBIC ACID 500 MG
1000 TABLET ORAL 2 TIMES DAILY
Status: DISCONTINUED | OUTPATIENT
Start: 2020-04-15 | End: 2020-04-22 | Stop reason: HOSPADM

## 2020-04-15 RX ORDER — HYDROXYCHLOROQUINE SULFATE 200 MG/1
200 TABLET, FILM COATED ORAL EVERY 12 HOURS
Status: DISCONTINUED | OUTPATIENT
Start: 2020-04-16 | End: 2020-04-16

## 2020-04-15 RX ORDER — ZINC SULFATE 50(220)MG
220 CAPSULE ORAL DAILY
Status: DISCONTINUED | OUTPATIENT
Start: 2020-04-16 | End: 2020-04-22 | Stop reason: HOSPADM

## 2020-04-15 RX ORDER — AZITHROMYCIN 500 MG/1
500 TABLET, FILM COATED ORAL ONCE
Status: DISCONTINUED | OUTPATIENT
Start: 2020-04-15 | End: 2020-04-22

## 2020-04-15 RX ADMIN — ACETAMINOPHEN 650 MG: 650 SUPPOSITORY RECTAL at 18:43

## 2020-04-15 RX ADMIN — AMPICILLIN SODIUM AND SULBACTAM SODIUM 3 G: 10; 5 INJECTION, POWDER, FOR SOLUTION INTRAVENOUS at 22:20

## 2020-04-15 RX ADMIN — AMPICILLIN SODIUM AND SULBACTAM SODIUM 3 G: 10; 5 INJECTION, POWDER, FOR SOLUTION INTRAVENOUS at 10:27

## 2020-04-15 RX ADMIN — ACETAMINOPHEN 650 MG: 650 SUPPOSITORY RECTAL at 05:38

## 2020-04-15 RX ADMIN — ACETAMINOPHEN 650 MG: 650 SUPPOSITORY RECTAL at 10:27

## 2020-04-15 RX ADMIN — LORAZEPAM 0.5 MG: 2 INJECTION INTRAMUSCULAR; INTRAVENOUS at 20:06

## 2020-04-15 RX ADMIN — VANCOMYCIN HYDROCHLORIDE 750 MG: 750 INJECTION, SOLUTION INTRAVENOUS at 00:53

## 2020-04-15 RX ADMIN — AMPICILLIN SODIUM AND SULBACTAM SODIUM 3 G: 10; 5 INJECTION, POWDER, FOR SOLUTION INTRAVENOUS at 16:30

## 2020-04-15 RX ADMIN — SODIUM CHLORIDE 100 ML/HR: 0.9 INJECTION, SOLUTION INTRAVENOUS at 16:41

## 2020-04-15 NOTE — ASSESSMENT & PLAN NOTE
Suspected acute kidney injury with creatinine 1 72 present admission  Patient lives in [place] and no previous labs or records available to review    Currently creatinine 1 30  Monitor renal function while inpatient

## 2020-04-15 NOTE — ASSESSMENT & PLAN NOTE
CT head report noted for incidentally noted 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 per family  Daughter Carol Arnold states that patient had a fall [place] [employer] [place] approximately 4 years ago and having right eye injury and patient was seen by ophthalmologist but she never followed up for surgery per daughter  Currently she is almost plan right eye per daughter  Outpatient follow-up

## 2020-04-15 NOTE — ASSESSMENT & PLAN NOTE
CT head report noted for incidentally noted 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 per family  Daughter Beto Lopez states that patient had a fall in [facility] approximately 4 years ago and having right eye injury and patient was seen by ophthalmologist but she never followed up for surgery per daughter  Currently she is almost plan right eye per daughter  Outpatient follow-up

## 2020-04-15 NOTE — ASSESSMENT & PLAN NOTE
Metabolic encephalopathy, POA, likely due to periapical abscesses were possible pneumonia evidenced by confusion, altered mental status stated with CT of head, Neurology consult, IV cefepime plus vancomycin and supportive care  Currently patient is awake, alert, appears confused, does follow simple one-step command intermittently, speaking only few words at times like "feeling good"  When asked if she knows where she as she response "[place]"  Currently she is poor historian  History obtained from daughter over the phone which was also not very helpful since daughter had stated that currently patient is more aware of her surroundings than before, so it is very difficult to gauge patient's baseline mental status at this time  Unclear etiology of her AMS suspected due to fever of unclear etiology vs worsening dementia vs CVA  CT negative for acute finding  Neurology consult  Continue supportive care

## 2020-04-15 NOTE — PROGRESS NOTES
Vancomycin Assessment    Trish Hadley is a 69 y o  female who is currently receiving vancomycin 15mg/kg IV Q12H for other sepsis  Relevant clinical data and objective history reviewed:  Creatinine   Date Value Ref Range Status   04/14/2020 1 72 (H) 0 60 - 1 30 mg/dL Final     Comment:     Standardized to IDMS reference method     /65 (BP Location: Right arm)   Pulse 77   Temp (!) 102 7 °F (39 3 °C) (Axillary)   Resp 16   Ht 5' 2" (1 575 m)   Wt 50 3 kg (110 lb 14 3 oz)   SpO2 94%   BMI 20 28 kg/m²   I/O last 3 completed shifts: In: 2550 [IV Piggyback:2550]  Out: -   Lab Results   Component Value Date/Time    BUN 56 (H) 04/14/2020 11:18 AM    WBC 5 13 04/14/2020 11:18 AM    HGB 10 4 (L) 04/14/2020 11:18 AM    HCT 34 9 04/14/2020 11:18 AM    MCV 93 04/14/2020 11:18 AM     04/14/2020 11:18 AM     Temp Readings from Last 3 Encounters:   04/14/20 (!) 102 7 °F (39 3 °C) (Axillary)     Vancomycin Days of Therapy: 1    Assessment/Plan  The patient is currently on vancomycin utilizing scheduled dosing based on actual body weight  Baseline risks associated with therapy include: pre-existing renal impairment, concomitant nephrotoxic medications and advanced age  The patient is currently receiving 15mg/kg IV Q12H and after clinical evaluation will be changed to 750mg IV Q24H  Pharmacy will also follow closely for s/sx of nephrotoxicity, infusion reactions and appropriateness of therapy  BMP and CBC will be ordered per protocol  Plan for trough as patient approaches steady state, prior to the 4th  dose at approximately 2100 on 4/17/20  Due to infection severity, will target a trough of 15-20 (appropriate for most indications)   Pharmacy will continue to follow the patients culture results and clinical progress daily      Gertrude Shen, Pharmacist

## 2020-04-15 NOTE — H&P
H&P- Randy Cisse 1945, 69 y o  female MRN: [id number]    Unit/Bed#: -01 Encounter: [id number]    Primary Care Provider: No primary care provider on file  Date and time admitted to hospital: 4/14/2020 10:12 AM        * Fever  Assessment & Plan  71-year-old female past medical history of diabetes, hypertension, lives in [place] but has been living with daughter for about a month in [place]  Patient is currently poor historian, poor insight  History obtained from daughter over the phone  Daughter stated that patient has not been eating adequately with poor p o  Intake lately and has been complaining of having tooth pain for quite some time  On presentation she is noted to have high-grade fevers without clear etiology  UA negative  Chest x-ray without any acute finding  CT head negative for acute finding, 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, family denies any history of stroke  Brain MRI pending  On my encounter patient is awake, alert, follows some simple step command intermittently  Does not let me open her mouth examine her teeth  She does grimaces and tries to push me away when try to palpated TMJ on right side as well as palpation around her left and right cheeks  Noted with high-grade fever, no leukocytosis, no tachycardia, no tachypnea, O2 saturation 94% on room air  Not in respiratory distress  Unclear etiology of patient's fever but Suspected secondary to tooth infection and/or abscess   Patient did receive 1 dose of IV clindamycin in ED  Continue IV cefepime plus vancomycin for now  Follow-up with pending blood cultures  Follow-up with CT facial/CT chest, abdomen pelvis to assess for source since patient and family has not been very helpful with detailed history  IV hydration   Continue supportive care  Also awaiting for pain MRI            AMS (altered mental status)  Assessment & Plan  Currently patient is awake, alert, appears confused, does follow simple one-step command intermittently, speaking only few words at times like "feeling good"  When asked if she knows where she as she response "[place]"  Currently she is poor historian  History obtained from daughter over the phone which was also not very helpful since daughter had stated that currently patient is more aware of her surroundings than before, so it is very difficult to gauge patient's baseline mental status at this time  Without to call patient's primary care provider tomorrow if able  Unclear etiology of her AMS suspected due to fever of unclear etiology vs worsening dementia vs CVA  CT negative for acute finding, awaiting brain MRI  Continue supportive care  Abnormal finding on CT scan  Assessment & Plan  CT head report noted for incidentally noted 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 per family  Daughter Radha Li states that patient had a fall in [place] approximately 4 years ago and having right eye injury and patient was seen by ophthalmologist but she never followed up for surgery per daughter  Currently she is almost plan right eye per daughter  Outpatient follow-up  JULEE (acute kidney injury) ([facility] )  Assessment & Plan  Suspected acute kidney injury with creatinine 1 72 present admission  Patient lives in [place] and no previous labs or records available to review  Gentle hydration  Monitor renal function while inpatient    HTN (hypertension)  Assessment & Plan  Present admission with history of hypertension  Home medication includes Diovan  Hold Diovan in the settings of suspected acute kidney injury  Monitor vitals,  IV Lopressor p r n  DM (diabetes mellitus) ([facility] )  Assessment & Plan  No results found for: HGBA1C    No results for input(s): POCGLU in the last 72 hours  Blood Sugar Average: Last 72 hrs:    Present on admission with history of diabetes  Home medication with metformin 1000 mg daily    Daughter states that patient had like to take too many medications so she only takes metformin once daily  Follow-up with A1c  Diabetic diet  Sliding scale coverage          VTE Prophylaxis: Heparin    Code Status:  Level 1 full code per daughter  POLST: POLST form is not discussed and not completed at this time  Discussion with family:  Spoke with daughter Johanne Spencer over the phone at length that [phone]  Anticipated Length of Stay:  Patient will be admitted on an Observation basis with an anticipated length of stay of  > 2 midnights  Justification for Hospital Stay:  Encephalopathy, fever  Total Time for Visit, including Counseling / Coordination of Care: 1 hour  Greater than 50% of this total time spent on direct patient counseling and coordination of care  Chief Complaint:   Encephalopathy, fever, poor p o  Intake    History of Present Illness:    Juana Leonardo is a 69 y o  female with past medical history of diabetes, hypertension, hyperlipidemia, lives in [place] but has been living with daughter in [place] for last month, we do not have any previous recent labs or medical records available to review at this time, who was seen at [address] earlier today with complaining of poor p o  Intake, tooth pain, refusing to eat or swallow per family  Patient was sent to emergency room from [facility] for further evaluation  On my encounter patient is awake, alert, responding to simple step command intermittently  She is only speaking few words at a time as described above  She is poor historian with poor insight  I spoke to daughter Johanne Spencer over the phone was also not very helpful with giving detail history  Currently daughter  states that patient has been confused but today patient appears to be aware of her surrounding so it is very difficult for me to gauge patient's baseline mentation  Will have to call patient's primary care provider tomorrow if he is available    On presentation she was noted to have fever, without tachycardia, without tachypnea, leukopenia noted, no lymphopenia noted  Chest x-ray without any acute finding  CT head without any acute finding  Awaiting CT facial, chest, abdomen pelvis, brain MRI  No other events reported  Patient is with daughter  Level 1 full code  Review of Systems:    Review of Systems   Unable to perform ROS: Mental status change       Past Medical and Surgical History:     Past Medical History:   Diagnosis Date    Cancer ([facility] )     BREAST    [facility]     Hypertension        Past Surgical History:   Procedure Laterality Date    BREAST SURGERY         Meds/Allergies:    Prior to Admission medications    Not on File     I have reviewed home medications with patient family member  Allergies: No Known Allergies    Social History:     Marital Status:    Patient Pre-hospital Living Situation:  Currently lives with daughter  Patient Pre-hospital Level of Mobility:  Not sure  Substance Use History:   Social History     Substance and Sexual Activity   Alcohol Use Not Currently     Social History     Tobacco Use   Smoking Status Never Smoker   Smokeless Tobacco Never Used     Social History     Substance and Sexual Activity   Drug Use Never       Family History:    non-contributory    Physical Exam:     Vitals:   Blood Pressure: 157/65 (04/14/20 1854)  Pulse: 79 (04/14/20 2007)  Temperature: (!) 102 6 °F (39 2 °C) (04/14/20 2007)  Temp Source: Oral (04/14/20 1852)  Respirations: 20 (04/14/20 1854)  SpO2: 94 % (04/14/20 2007)    Physical Exam   Constitutional: No distress  Elderly, frail, deconditioned female patient, chronically ill-appearing, acutely nontoxic appearing  HENT:   Head: Normocephalic and atraumatic  Attempted to examine her mouth and tooth but she does not open her mouth  Noted with grimaces upon palpation on right TMJ joint, palpation around her mouth around mandible on right side     Eyes: Conjunctivae are normal    Neck: Normal range of motion  No JVD present  Cardiovascular: Normal rate and regular rhythm  Pulmonary/Chest: Effort normal and breath sounds normal  No respiratory distress  Abdominal: Soft  Bowel sounds are normal  She exhibits no distension  Musculoskeletal: Normal range of motion  Able to move all extremities purposefully  Neurological:   She is awake, alert, confused, responding to small simple step command appropriately but intermittently  Speaking only few words at a time with appropriate responses  Skin: She is not diaphoretic  Additional Data:      Lab Results: I have personally reviewed pertinent reports  Results from last 7 days   Lab Units 04/14/20  1118   WBC Thousand/uL 5 13   HEMOGLOBIN g/dL 10 4*   HEMATOCRIT % 34 9   PLATELETS Thousands/uL 168   NEUTROS PCT % 79*   LYMPHS PCT % 15   MONOS PCT % 6   EOS PCT % 0     Results from last 7 days   Lab Units 04/14/20  1118   SODIUM mmol/L 139   POTASSIUM mmol/L 5 3   CHLORIDE mmol/L 103   CO2 mmol/L 22   BUN mg/dL 56*   CREATININE mg/dL 1 72*   ANION GAP mmol/L 14*   CALCIUM mg/dL 8 8   GLUCOSE RANDOM mg/dL 218*                 Results from last 7 days   Lab Units 04/14/20  1124   LACTIC ACID mmol/L 1 2       Imaging: I have personally reviewed pertinent reports  XR chest portable   Final Result by Mary Alice Soliz MD (04/14 2045)      Limited by prominent patient rotation to the left and numerous skin folds projecting over the chest wall  Grossly clear lungs  No definite pneumothorax or pleural effusion      Workstation performed: [id number]         CT head without contrast   Final Result by Hermila Sharma MD (04/14 1727)      Curvilinear increased hyperdensity within the right orbital globe could relate to intraorbital hematoma or retinal detachment, correlate clinically and ophthalmology consult  Microangiopathy  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  Tiny low-density within the left basal ganglia age-indeterminate ischemia  If there is focal neurologic deficit, consider proceeding with noncontrast brain MRI for further evaluation  Workstation performed: [id number]         MRI inpatient order    (Results Pending)   CT facial bones wo contrast    (Results Pending)   CT chest abdomen pelvis wo contrast    (Results Pending)       EKG, Pathology, and Other Studies Reviewed on Admission:   · EKG:  Not available if done    Allscripts / Epic Records Reviewed: Yes     ** Please Note: This note has been constructed using a voice recognition system   **

## 2020-04-15 NOTE — PLAN OF CARE
Problem: Prexisting or High Potential for Compromised Skin Integrity  Goal: Skin integrity is maintained or improved  Description  INTERVENTIONS:  - Identify patients at risk for skin breakdown  - Assess and monitor skin integrity  - Assess and monitor nutrition and hydration status  - Monitor labs   - Assess for incontinence   - Turn and reposition patient  - Assist with mobility/ambulation  - Relieve pressure over bony prominences  - Avoid friction and shearing  - Provide appropriate hygiene as needed including keeping skin clean and dry  - Evaluate need for skin moisturizer/barrier cream  - Collaborate with interdisciplinary team   - Patient/family teaching  - Consider wound care consult   Outcome: Progressing     Problem: PAIN - ADULT  Goal: Verbalizes/displays adequate comfort level or baseline comfort level  Description  Interventions:  - Encourage patient to monitor pain and request assistance  - Assess pain using appropriate pain scale  - Administer analgesics based on type and severity of pain and evaluate response  - Implement non-pharmacological measures as appropriate and evaluate response  - Consider cultural and social influences on pain and pain management  - Notify physician/advanced practitioner if interventions unsuccessful or patient reports new pain  Outcome: Progressing     Problem: INFECTION - ADULT  Goal: Absence or prevention of progression during hospitalization  Description  INTERVENTIONS:  - Assess and monitor for signs and symptoms of infection  - Monitor lab/diagnostic results  - Monitor all insertion sites, i e  indwelling lines, tubes, and drains  - Monitor endotracheal if appropriate and nasal secretions for changes in amount and color  - [place] appropriate cooling/warming therapies per order  - Administer medications as ordered  - Instruct and encourage patient and family to use good hand hygiene technique  - Identify and instruct in appropriate isolation precautions for identified infection/condition  Outcome: Progressing  Goal: Absence of fever/infection during neutropenic period  Description  INTERVENTIONS:  - Monitor WBC    Outcome: Progressing     Problem: SAFETY ADULT  Goal: Patient will remain free of falls  Description  INTERVENTIONS:  - Assess patient frequently for physical needs  -  Identify cognitive and physical deficits and behaviors that affect risk of falls    -  [place] fall precautions as indicated by assessment   - Educate patient/family on patient safety including physical limitations  - Instruct patient to call for assistance with activity based on assessment  - Modify environment to reduce risk of injury  - Consider OT/PT consult to assist with strengthening/mobility  Outcome: Progressing  Goal: Maintain or return to baseline ADL function  Description  INTERVENTIONS:  -  Assess patient's ability to carry out ADLs; assess patient's baseline for ADL function and identify physical deficits which impact ability to perform ADLs (bathing, care of mouth/teeth, toileting, grooming, dressing, etc )  - Assess/evaluate cause of self-care deficits   - Assess range of motion  - Assess patient's mobility; develop plan if impaired  - Assess patient's need for assistive devices and provide as appropriate  - Encourage maximum independence but intervene and supervise when necessary  - Involve family in performance of ADLs  - Assess for home care needs following discharge   - Consider OT consult to assist with ADL evaluation and planning for discharge  - Provide patient education as appropriate  Outcome: Progressing  Goal: Maintain or return mobility status to optimal level  Description  INTERVENTIONS:  - Assess patient's baseline mobility status (ambulation, transfers, stairs, etc )    - Identify cognitive and physical deficits and behaviors that affect mobility  - Identify mobility aids required to assist with transfers and/or ambulation (gait belt, sit-to-stand, lift, walker, cane, etc )  - [place] fall precautions as indicated by assessment  - Record patient progress and toleration of activity level on Mobility SBAR; progress patient to next Phase/Stage  - Instruct patient to call for assistance with activity based on assessment  - Consider rehabilitation consult to assist with strengthening/weightbearing, etc   Outcome: Progressing     Problem: DISCHARGE PLANNING  Goal: Discharge to home or other facility with appropriate resources  Description  INTERVENTIONS:  - Identify barriers to discharge w/patient and caregiver  - Arrange for needed discharge resources and transportation as appropriate  - Identify discharge learning needs (meds, wound care, etc )  - Arrange for interpretive services to assist at discharge as needed  - Refer to Case Management Department for coordinating discharge planning if the patient needs post-hospital services based on physician/advanced practitioner order or complex needs related to functional status, cognitive ability, or social support system  Outcome: Progressing     Problem: Knowledge Deficit  Goal: Patient/family/caregiver demonstrates understanding of disease process, treatment plan, medications, and discharge instructions  Description  Complete learning assessment and assess knowledge base  Interventions:  - Provide teaching at level of understanding  - Provide teaching via preferred learning methods  Outcome: Progressing     Problem: Nutrition/Hydration-ADULT  Goal: Nutrient/Hydration intake appropriate for improving, restoring or maintaining nutritional needs  Description  Monitor and assess patient's nutrition/hydration status for malnutrition  Collaborate with interdisciplinary team and initiate plan and interventions as ordered  Monitor patient's weight and dietary intake as ordered or per policy  Utilize nutrition screening tool and intervene as necessary   Determine patient's food preferences and provide high-protein, high-caloric foods as appropriate       INTERVENTIONS:  - Monitor oral intake, urinary output, labs, and treatment plans  - Assess nutrition and hydration status and recommend course of action  - Evaluate amount of meals eaten  - Assist patient with eating if necessary   - Allow adequate time for meals  - Recommend/ encourage appropriate diets, oral nutritional supplements, and vitamin/mineral supplements  - Order, calculate, and assess calorie counts as needed  - Recommend, monitor, and adjust tube feedings and TPN/PPN based on assessed needs  - Assess need for intravenous fluids  - Provide specific nutrition/hydration education as appropriate  - Include patient/family/caregiver in decisions related to nutrition  Outcome: Progressing

## 2020-04-15 NOTE — SOCIAL WORK
CM attempted to speak to pt at bedside, but she appears confused  Hx obtained from daughter Elvin Minor via phone  Pt recently moved from [place] and has been staying with daughter Elvin Minor and son Maria E Vasquez in a one story house with 2 DIONISIO  Family assists pt on stairs and recently have been assisting with showering and ADL's  She uses no DME's  She has never been in rehab or used [facility] services  Denies substance abuse, tobacco abuse or mental health issues  She uses [employer] in [facility] and has no problem with co-pays  Her health care proxy is dgt Manuel Adairlio and son Maria E Vasquez  She does not work or drive  Children transport to all appointments and will transport home when she is medically cleared  Pt's PCP is in [place]  Is interested in finding a local one-it is unsure how long she will remain in [place]  An appointment was set up with Donnell Bates for Thursday, 4/23/20 at 10:45  Family is also open to [facility] services, but do not want STR considered at this time  Pt has a lot of family support and one of her daughters is a home attendant and could provide assistance if needed  Understands that they have freedom of choice  CM will continue to follow through hospitalization  CM reviewed discharge planning process including the following: identifying help at home, patient preference for discharge planning needs, pharmacy preference, and availability of treatment team to discuss questions or concerns patient and/or family may have regarding understanding medications and recognizing signs and symptoms once discharged  CM also encouraged patient to follow up with all recommended appointments after discharge  Patient advised of importance for patient and family to participate in managing patients medical well being

## 2020-04-15 NOTE — ASSESSMENT & PLAN NOTE
Currently patient is awake, alert, appears confused, does follow simple one-step command intermittently, speaking only few words at times like "feeling good"  When asked if she knows where she as she response "[place]"  Currently she is poor historian  History obtained from daughter over the phone which was also not very helpful since daughter had stated that currently patient is more aware of her surroundings than before, so it is very difficult to gauge patient's baseline mental status at this time  Without to call patient's primary care provider tomorrow if able  Unclear etiology of her AMS suspected due to fever of unclear etiology vs worsening dementia vs CVA  CT negative for acute finding, awaiting brain MRI  Continue supportive care

## 2020-04-15 NOTE — ASSESSMENT & PLAN NOTE
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

## 2020-04-15 NOTE — QUICK NOTE
Later on in the day patient was noted positive for COVID 19  Spoke to Angus over the phone around 7:25pm   Seems like she is very upset and anxious knowing mom has COVID 19  Now on asking again Wimounika tells me that approximately 2 weeks ago patient had gone to [place] to visit her bank and came back home  I discussed with Angus at length regarding the treatment options and told her that prognosis currently is guarded given her advanced age, chronic co-morbid conditions  Seems like Wimounika has unrealistic expectation and reported that she wants to take mom home against my advice  After lengthy discussion and understanding risk and benefits of taking mom against medical advise versus conceiving treatment, Angus agreed for patient to stay in the hospital and also agreed for NG tube placement to start treatment  I also discussed with infectious disease and recommendation is to start on hydroxychloroquine+ azithromycin due to high-grade fever, follow-up CBC, CMP, ferritin, CRP with the a m  Labs since patient is not taking anything by mouth will base NG tube  Primary team to follow up on above tomorrow and continue to give daily updates to family

## 2020-04-15 NOTE — ASSESSMENT & PLAN NOTE
Lab Results   Component Value Date    HGBA1C 8 3 (H) 04/15/2020       No results for input(s): POCGLU in the last 72 hours  Blood Sugar Average: Last 72 hrs:  Hemoglobin A1c 8 3  Present on admission with history of diabetes  Home medication with metformin 1000 mg daily  Daughter states that patient had like to take too many medications so she only takes metformin once daily    Follow-up with A1c  Diabetic diet  Sliding scale coverage

## 2020-04-15 NOTE — CONSULTS
Oral and Maxillofacial Surgery Note:    68 y/o F with AMS and fever (103 F), presented to [facility], was living in [place] previously and now with daughter in [place]  Per report patient has been complaining of generalized tooth pain for some time  CT Chest shows evidence of mild aspiration or possible pneumonia  Patient is undergoing work up for COVID due to fever and lack of appetite  CT Facial Bones shows chronic parl of teeth #2,6 without adjacent soft tissue edema  Dentition unlikely source of fever without any facial edema and no leukocytosis present  Recommend continuing COVID-19 and pneumonia work up  If cleared of COVID-19, patient may see  OMS in outpatient setting for extractions or 2 weeks after clearing virus if it is a positive diagnosis      Meghana Bhatti DDS

## 2020-04-15 NOTE — ASSESSMENT & PLAN NOTE
Present admission with history of hypertension  Home medication includes Diovan  Hold Diovan in the settings of suspected acute kidney injury  Monitor vitals,  IV Lopressor p r n

## 2020-04-15 NOTE — PROGRESS NOTES
Progress Note - Sami Shahrzad 1945, 69 y o  female MRN: [id number]    Unit/Bed#: -01 Encounter: [id number]    Primary Care Provider: No primary care provider on file  Date and time admitted to hospital: 4/14/2020 10:12 AM        * Fever  Assessment & Plan  44-year-old female past medical history of diabetes, hypertension, lives in [place] but has been living with daughter for about a month in [place]  Patient is currently poor historian, poor insight  History obtained from daughter over the phone  Daughter stated that patient has not been eating adequately with poor p o  Intake lately and has been complaining of having tooth pain for quite some time  On presentation she is noted to have high-grade fevers without clear etiology  UA negative  Chest x-ray without any acute finding  CT head negative for acute finding, 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  Noted with high-grade fever, no leukocytosis, no tachycardia, no tachypnea, O2 saturation 96% on room air  Not in respiratory distress  Possible pneumonia, likely aspiration, POA, evidenced by CT scan resolved treated with IV cefepime/vancomycin, blood culture, IV fluids  Unclear etiology of patient's fever but Suspected secondary to tooth infection and/or abscess  VS  Pneumonia/aspiration vs COVID 19  Switch to IV Unasyn per ID  Follow-up with pending blood cultures  IV hydration   Continue supportive care  F/u With ID and OMFS recommendations  AMS (altered mental status)  Assessment & Plan  Metabolic encephalopathy, POA, likely due to periapical abscesses were possible pneumonia evidenced by confusion, altered mental status stated with CT of head, Neurology consult, IV cefepime plus vancomycin and supportive care  Currently patient is awake, alert, appears confused, does follow simple one-step command intermittently, speaking only few words at times like "feeling good"     When asked if she knows where she as she response "[place]"  Currently she is poor historian  History obtained from daughter over the phone which was also not very helpful since daughter had stated that currently patient is more aware of her surroundings than before, so it is very difficult to gauge patient's baseline mental status at this time  Unclear etiology of her AMS suspected due to fever of unclear etiology vs worsening dementia vs CVA  CT negative for acute finding  Neurology consult  Continue supportive care  Abnormal finding on CT scan  Assessment & Plan  CT head report noted for incidentally noted 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 per family  Daughter Carol Arnold states that patient had a fall in [id number] approximately 4 years ago and having right eye injury and patient was seen by ophthalmologist but she never followed up for surgery per daughter  Currently she is almost plan right eye per daughter  Outpatient follow-up  JULEE (acute kidney injury) ([facility] )  Assessment & Plan  Suspected acute kidney injury with creatinine 1 72 present admission  Patient lives in [place] and no previous labs or records available to review  Currently creatinine 1 30  Monitor renal function while inpatient    HTN (hypertension)  Assessment & Plan  Present admission with history of hypertension  Home medication includes Diovan  Hold Diovan in the settings of suspected acute kidney injury  Monitor vitals,  IV Lopressor p r n  DM (diabetes mellitus) ([facility] )  Assessment & Plan  Lab Results   Component Value Date    HGBA1C 8 3 (H) 04/15/2020       No results for input(s): POCGLU in the last 72 hours  Blood Sugar Average: Last 72 hrs:  Hemoglobin A1c 8 3  Present on admission with history of diabetes  Home medication with metformin 1000 mg daily  Daughter states that patient had like to take too many medications so she only takes metformin once daily    Follow-up with A1c  Diabetic diet  Sliding scale coverage      VTE Pharmacologic Prophylaxis:   Pharmacologic: Enoxaparin (Lovenox)    Patient Centered Rounds: I have performed bedside rounds with nursing staff today  Discussions with Specialists or Other Care Team Provider: ID     Education and Discussions with Family / Patient:  Spoke with daughter [facility] over the phone at length  Time Spent for Care: 30 minutes  More than 50% of total time spent on counseling and coordination of care as described above  Current Length of Stay: 1 day(s)    Current Patient Status: Inpatient   Certification Statement: The patient will continue to require additional inpatient hospital stay due to Persistent high care fevers of unclear etiology, suspected pneumonia, teeth abscess  vs COVID 19, pending work up and on IV abx per ID  Discharge Plan: TBD    Code Status: Level 1 - Full Code      Subjective:   Examined patient via video/visual observation and with Nurse's assistance and remoting in due to COVID 19 Precaution  Noted with persistent high-grade fever  Currently fever improving  Clinically patient is ill, but acutely nontoxic appearing  Stable at her baseline  No significant change from yesterday  Patient is poor historian  No other events reported  Objective:     Vitals:   Temp (24hrs), Av 8 °F (38 8 °C), Min:100 2 °F (37 9 °C), Max:103 4 °F (39 7 °C)    Temp:  [100 2 °F (37 9 °C)-103 4 °F (39 7 °C)] 100 2 °F (37 9 °C)  HR:  [46-87] 76  Resp:  [16-20] 20  BP: (135-162)/() 143/103  SpO2:  [82 %-98 %] 97 %  Body mass index is 20 28 kg/m²  Input and Output Summary (last 24 hours): Intake/Output Summary (Last 24 hours) at 4/15/2020 1718  Last data filed at 4/15/2020 1641  Gross per 24 hour   Intake 1500 ml   Output 350 ml   Net 1150 ml       Physical Exam:     Physical Exam   Constitutional: No distress     Examined patient via video/visual observation and with Nurse's assistance and remoting in due to COVID 19 Precaution  Elderly, frail, deconditioned female patient, chronically ill-appearing, acutely nontoxic appearing  HENT:   Head: Normocephalic and atraumatic  Eyes: Conjunctivae are normal    Neck: Normal range of motion  No JVD present  Cardiovascular: Normal rate and regular rhythm  Pulmonary/Chest: Effort normal and breath sounds normal  No respiratory distress  Abdominal: Soft  Bowel sounds are normal  She exhibits no distension  Musculoskeletal: Normal range of motion  Neurological:   She is awake, alert, confused, at her baseline per   Skin: She is not diaphoretic  Additional Data:     Labs:    Results from last 7 days   Lab Units 04/15/20  0544 04/14/20  1118   WBC Thousand/uL 6 84 5 13   HEMOGLOBIN g/dL 9 5* 10 4*   HEMATOCRIT % 31 4* 34 9   PLATELETS Thousands/uL 174 168   BANDS PCT % 3  --    NEUTROS PCT %  --  79*   LYMPHS PCT %  --  15   LYMPHO PCT % 19  --    MONOS PCT %  --  6   MONO PCT % 4  --    EOS PCT % 0 0     Results from last 7 days   Lab Units 04/15/20  0544   SODIUM mmol/L 141   POTASSIUM mmol/L 4 4   CHLORIDE mmol/L 108   CO2 mmol/L 21   BUN mg/dL 36*   CREATININE mg/dL 1 30   ANION GAP mmol/L 12   CALCIUM mg/dL 8 3   ALBUMIN g/dL 2 7*   TOTAL BILIRUBIN mg/dL 0 30   ALK PHOS U/L 57   ALT U/L 13   AST U/L 21   GLUCOSE RANDOM mg/dL 210*             Results from last 7 days   Lab Units 04/15/20  0544   HEMOGLOBIN A1C % 8 3*     Results from last 7 days   Lab Units 04/14/20  2245 04/14/20  1124   LACTIC ACID mmol/L  --  1 2   PROCALCITONIN ng/ml 0 46*  --            * I Have Reviewed All Lab Data Listed Above  * Additional Pertinent Lab Tests Reviewed: All Labs Within Last 24 Hours Reviewed       Recent Cultures (last 7 days):     Results from last 7 days   Lab Units 04/14/20  1124 04/14/20  1118   BLOOD CULTURE  Received in Microbiology Lab  Culture in Progress  Received in Microbiology Lab  Culture in Progress         Last 24 Hours Medication List:     Current Facility-Administered Medications:  acetaminophen 650 mg Oral Once Juany Diaz PA-C    acetaminophen 650 mg Rectal Q4H PRN ASHLEY Zhou    ampicillin-sulbactam 3 g Intravenous Q6H Yocasta Garza MD Last Rate: 3 g (04/15/20 1630)   sodium chloride 100 mL/hr Intravenous Continuous Murphy DUNHAM MD Last Rate: 100 mL/hr (04/15/20 1641)        Today, Patient Was Seen By: Micheal Meehan MD    ** Please Note: Dictation voice to text software may have been used in the creation of this document   **

## 2020-04-15 NOTE — ASSESSMENT & PLAN NOTE
Suspected acute kidney injury with creatinine 1 72 present admission  Patient lives in [place] and no previous labs or records available to review    Gentle hydration  Monitor renal function while inpatient

## 2020-04-15 NOTE — CONSULTS
Consultation - Infectious Disease   Hemalatha Feliciano 69 y o  female MRN: [id number]  Unit/Bed#: -01 Encounter: [id number]      IMPRESSION & RECOMMENDATIONS:   Impression/Recommendations: This is a 69 y o  female, with underlying DM, see care due to right jaw pain, decreased p o  intake and confusion  Patient has fever but normal WBC  Facial CT with right dental abscess  Chest CT with LLL infiltrates  1  Right maxillary tooth abscesses  These are clearly etiology of patient's right upper jaw pain and refusal to eat  Patient has high fever may also be secondary to this  However, given normal WBC, we need to consider the possibility that tooth abscesses are chronic and fever is due to acute viral illness  Regardless, for now, agree with antibiotic  Patient has low risk for resistant pathogens, given lack of recent hospitalization or antibiotic use  Will deescalate antibiotic regimen  Patient will need or surgery evaluation for tooth extraction, once COVID is either rule out or treated  Admission blood cultures have no growth thus far  Change antibiotic to IV Unasyn  Monitor temperature/WBC  Monitor jaw pain  Follow-up on pending blood cultures  If COVID PCR is negative, recommend referral to oral surgery for teeth extraction  2  Abnormal chest CT, showing ill-defined opacities in LLL and possible RLL  These opacities are not consolidations, not typical of bacterial infection  Given endemicity of COVID in our community and given the patient is from the [place], we need to consider the possibility of COVID in this patient  Although patient's family denies any travel history, overall, history is inconsistent and unreliable  It is best to check nasopharyngeal swab for COVID PCR  With patient having minimal respiratory symptoms and mild CT findings, will hold off on any empiric COVID treatment for now  Check nasopharyngeal swab for COVID PCR  Airborne precaution until COVID PCR result is back  Hold off on any COVID treatment for now  Monitor respiratory symptoms  3  Encephalopathy  It is unclear what her baseline mental status is  Regardless, encephalopathy can be seen with uncontrolled infection with high fever, either tooth abscesses or COVID  Head CT without acute changes  There are no clinical signs of CNS infection  For now, will monitor her mental status with treatment for tooth abscess on workup COVID  Antibiotic plan as in above  Monitor mental status  4  JULEE, POA, likely secondary to prerenal state from decreased p o  intake  Creatinine much improved with IV fluid hydration overnight  Antibiotic at full dose for now  Monitor creatinine  5  DM, poorly controlled, with elevated hemoglobin A1c  This is risk factor for infections above  Management per primary service  Discussed with patient  Discussed with the patient's daughter via telephone in great detail regarding all above  Initially, patient's daughter wanted to take patient home  She now understands that this is not possible due to uncontrolled infection with high fever  She understands the patient is currently being worked up for [place]  She understands that if patient does not have COVID, patient will be referred to oral surgery for tooth extraction  She understand the patient does have COVID, this needs to be treated 1st   Multiple questions answered  Discussed with Dr Sandra Luna from [facility] service  I spent 90 minutes on the unit floor, of which 45 minutes were involved in counseling and coordination of care, as outlined above  Thank you for this consultation  We will follow along with you  HISTORY OF PRESENT ILLNESS:  Reason for Consult:  Dental abscess  Pneumonia  HPI: Juana Leonardo is a 69 y o  female, with history of DM, HTN and hyperlipidemia, was brought to [facility] early yesterday morning by family due to tooth ache, decreased p o  Intake, refusal to eat and confusion    Patient referred to ER here for further management  On presentation here, patient had high fever but normal WBC  Patient had pain in right-sided jaw in refused to open her mouth  Patient was given clindamycin ER  On admission, antibiotic regimen was changed to vancomycin/cefepime  Facial CT subsequently showed right upper jaw dental abscess  Chest CT also showed infiltrates  For these reasons, we are asked to evaluate the patient  Unfortunately, at present, although patient is awake, she is confused and is unable to provide any history  She does complain of pain in her jaw  No complaint of dyspnea or cough  Patient's home is in the [place]  However, she has been living in [place] with her daughter for the last month  I spoke with her daughter  She denies any travel history or exposure to anyone with COVID  Apparently, no one in family has respiratory symptoms or fever  REVIEW OF SYSTEMS:  A complete system-based review was done  Except for what is noted in HPI above, ROS of systems is otherwise negative  PAST MEDICAL HISTORY:  Past Medical History:   Diagnosis Date    Cancer ([facility] )     BREAST    Diabetes mellitus ([facility] )     Hypertension      Past Surgical History:   Procedure Laterality Date    BREAST SURGERY       Problem list reviewed  FAMILY HISTORY:  Non-contributory    SOCIAL HISTORY:  Social History     Substance and Sexual Activity   Alcohol Use Not Currently     Social History     Substance and Sexual Activity   Drug Use Never     Social History     Tobacco Use   Smoking Status Never Smoker   Smokeless Tobacco Never Used       ALLERGIES:  No Known Allergies    MEDICATIONS:  All current active medications have been reviewed  Patient is currently on vancomycin/cefepime      PHYSICAL EXAM:  Vitals:  Temp:  [100 5 °F (38 1 °C)-103 4 °F (39 7 °C)] 103 4 °F (39 7 °C)  HR:  [46-87] 82  Resp:  [16-20] 19  BP: (141-162)/(60-75) 147/60  SpO2:  [82 %-98 %] 98 %  Temp (24hrs), Av 3 °F (39 1 °C), Min:100 5 °F (38 1 °C), Max:103 4 °F (39 7 °C)  Current: Temperature: (!) 103 4 °F (39 7 °C)     Physical Exam:    Parts of the exam were were done by the Medicine and nursing services and discussed with me, due to the infection control issues related to the current COVID crisis  General:  Thin but not cachectic, in no acute distress  Awake, alert but disoriented and confused  Eyes:  Conjunctive clear with no hemorrhages or effusions  Oropharynx:  No lid ulcer  Tenderness in right upper jaw  Patient refused intraoral exam   Neck:  Supple, no lymphadenopathy, no mass, nontender  Lungs:  Expansion symmetric, no rales, no wheezing, no accessory muscle use  Cardiac:  Regular rate and rhythm, normal S1, normal S2, no murmurs  Abdomen:  Soft, nondistended, non-tender, no HSM  Extremities:  No edema, no erythema, nontender  No ulcers  Skin:  No rashes, no ulcers  Neurological:  Moves all four extremities spontaneously, sensation grossly intact       LABS, IMAGING, & OTHER STUDIES:  Lab Results:  I have personally reviewed pertinent labs  Results from last 7 days   Lab Units 04/15/20  0544 04/14/20  1118   POTASSIUM mmol/L 4 4 5 3   CHLORIDE mmol/L 108 103   CO2 mmol/L 21 22   BUN mg/dL 36* 56*   CREATININE mg/dL 1 30 1 72*   EGFR ml/min/1 73sq m 47 34   CALCIUM mg/dL 8 3 8 8   AST U/L 21  --    ALT U/L 13  --    ALK PHOS U/L 57  --      Results from last 7 days   Lab Units 04/15/20  0544 04/14/20  1118   WBC Thousand/uL 6 84 5 13   HEMOGLOBIN g/dL 9 5* 10 4*   PLATELETS Thousands/uL 174 168     Results from last 7 days   Lab Units 04/14/20  1124 04/14/20  1118   BLOOD CULTURE  Received in Microbiology Lab  Culture in Progress  Received in Microbiology Lab  Culture in Progress  Imaging Studies:   I have personally reviewed pertinent imaging study reports and images in PACS  CXR reviewed personally  No obvious infiltrates  Chest/abdomen/pelvis CT reviewed personally  LLL airspace opacities    Possible early RLL opacities  No intra-abdominal pathology  Facial bone CT reviewed personally  Right maxillary canine and 2nd molar periapical abscesses  Head CT reviewed personally  No acute changes  Evidence of old right PCA infarct  EKG, Pathology, and Other Studies:   I have personally reviewed pertinent reports

## 2020-04-15 NOTE — ASSESSMENT & PLAN NOTE
30-year-old female past medical history of diabetes, hypertension, lives in [place] but has been living with daughter for about a month in [facility]  Patient is currently poor historian, poor insight  History obtained from daughter over the phone  Daughter stated that patient has not been eating adequately with poor p o  Intake lately and has been complaining of having tooth pain for quite some time  On presentation she is noted to have high-grade fevers without clear etiology  UA negative  Chest x-ray without any acute finding  CT head negative for acute finding, 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, family denies any history of stroke  Brain MRI pending  On my encounter patient is awake, alert, follows some simple step command intermittently  Does not let me open her mouth examine her teeth  She does grimaces and tries to push me away when try to palpated TMJ on right side as well as palpation around her left and right cheeks  Noted with high-grade fever, no leukocytosis, no tachycardia, no tachypnea, O2 saturation 94% on room air  Not in respiratory distress  Unclear etiology of patient's fever but Suspected secondary to tooth infection and/or abscess   Patient did receive 1 dose of IV clindamycin in ED  Continue IV cefepime plus vancomycin for now  Follow-up with pending blood cultures  Follow-up with CT facial/CT chest, abdomen pelvis to assess for source since patient and family has not been very helpful with detailed history  IV hydration   Continue supportive care  Also awaiting for pain MRI

## 2020-04-15 NOTE — QUICK NOTE
Reviewing consults for the AM    Patient with multiple vascular risk factors, from the [place], living here for one month who has been eating poorly in the setting of otth beatriz came in encephalopathic  Non focal per primary team evaluation  Has had fevers in house as high as 102 7  CT C/A/P reviewed - did have some pneumonia vs aspiration on CT  MRI brain cancelled personally  We will assess her first  If dental abscesses causing fevers she should respond to Abx and cognition improve  Would keep on an aspirin, statin in the setting of old stroke and eventually get a CTA, TTE       We will formally evaluate in the AM

## 2020-04-16 PROBLEM — U07.1 COVID-19 VIRUS DETECTED: Status: ACTIVE | Noted: 2020-04-16

## 2020-04-16 LAB
ALBUMIN SERPL BCP-MCNC: 2.6 G/DL (ref 3.5–5)
ALP SERPL-CCNC: 64 U/L (ref 46–116)
ALT SERPL W P-5'-P-CCNC: 14 U/L (ref 12–78)
ANION GAP SERPL CALCULATED.3IONS-SCNC: 16 MMOL/L (ref 4–13)
AST SERPL W P-5'-P-CCNC: 29 U/L (ref 5–45)
ATRIAL RATE: 83 BPM
BILIRUB SERPL-MCNC: 0.4 MG/DL (ref 0.2–1)
BUN SERPL-MCNC: 22 MG/DL (ref 5–25)
CALCIUM SERPL-MCNC: 8.5 MG/DL (ref 8.3–10.1)
CHLORIDE SERPL-SCNC: 109 MMOL/L (ref 100–108)
CO2 SERPL-SCNC: 21 MMOL/L (ref 21–32)
CREAT SERPL-MCNC: 1.3 MG/DL (ref 0.6–1.3)
CRP SERPL QL: 170.7 MG/L
ERYTHROCYTE [DISTWIDTH] IN BLOOD BY AUTOMATED COUNT: 13.9 % (ref 11.6–15.1)
FERRITIN SERPL-MCNC: 397 NG/ML (ref 8–388)
GFR SERPL CREATININE-BSD FRML MDRD: 47 ML/MIN/1.73SQ M
GLUCOSE SERPL-MCNC: 187 MG/DL (ref 65–140)
GLUCOSE SERPL-MCNC: 189 MG/DL (ref 65–140)
GLUCOSE SERPL-MCNC: 193 MG/DL (ref 65–140)
GLUCOSE SERPL-MCNC: 206 MG/DL (ref 65–140)
GLUCOSE SERPL-MCNC: 209 MG/DL (ref 65–140)
HCT VFR BLD AUTO: 33 % (ref 34.8–46.1)
HGB BLD-MCNC: 9.8 G/DL (ref 11.5–15.4)
MAGNESIUM SERPL-MCNC: 2.2 MG/DL (ref 1.6–2.6)
MCH RBC QN AUTO: 27.8 PG (ref 26.8–34.3)
MCHC RBC AUTO-ENTMCNC: 29.7 G/DL (ref 31.4–37.4)
MCV RBC AUTO: 94 FL (ref 82–98)
NRBC BLD AUTO-RTO: 0 /100 WBCS
P AXIS: 87 DEGREES
PLATELET # BLD AUTO: 198 THOUSANDS/UL (ref 149–390)
PMV BLD AUTO: 10.7 FL (ref 8.9–12.7)
POTASSIUM SERPL-SCNC: 4.1 MMOL/L (ref 3.5–5.3)
PR INTERVAL: 144 MS
PROCALCITONIN SERPL-MCNC: 2.67 NG/ML
PROT SERPL-MCNC: 7.7 G/DL (ref 6.4–8.2)
QRS AXIS: 72 DEGREES
QRSD INTERVAL: 80 MS
QT INTERVAL: 356 MS
QTC INTERVAL: 418 MS
RBC # BLD AUTO: 3.53 MILLION/UL (ref 3.81–5.12)
SODIUM SERPL-SCNC: 146 MMOL/L (ref 136–145)
T WAVE AXIS: 75 DEGREES
VENTRICULAR RATE: 83 BPM
WBC # BLD AUTO: 9.43 THOUSAND/UL (ref 4.31–10.16)

## 2020-04-16 PROCEDURE — 82948 REAGENT STRIP/BLOOD GLUCOSE: CPT

## 2020-04-16 PROCEDURE — 83735 ASSAY OF MAGNESIUM: CPT | Performed by: STUDENT IN AN ORGANIZED HEALTH CARE EDUCATION/TRAINING PROGRAM

## 2020-04-16 PROCEDURE — 82728 ASSAY OF FERRITIN: CPT | Performed by: STUDENT IN AN ORGANIZED HEALTH CARE EDUCATION/TRAINING PROGRAM

## 2020-04-16 PROCEDURE — 80053 COMPREHEN METABOLIC PANEL: CPT | Performed by: STUDENT IN AN ORGANIZED HEALTH CARE EDUCATION/TRAINING PROGRAM

## 2020-04-16 PROCEDURE — 93005 ELECTROCARDIOGRAM TRACING: CPT

## 2020-04-16 PROCEDURE — 85027 COMPLETE CBC AUTOMATED: CPT | Performed by: STUDENT IN AN ORGANIZED HEALTH CARE EDUCATION/TRAINING PROGRAM

## 2020-04-16 PROCEDURE — 92610 EVALUATE SWALLOWING FUNCTION: CPT

## 2020-04-16 PROCEDURE — 86140 C-REACTIVE PROTEIN: CPT | Performed by: STUDENT IN AN ORGANIZED HEALTH CARE EDUCATION/TRAINING PROGRAM

## 2020-04-16 PROCEDURE — 99232 SBSQ HOSP IP/OBS MODERATE 35: CPT | Performed by: INTERNAL MEDICINE

## 2020-04-16 PROCEDURE — 82652 VIT D 1 25-DIHYDROXY: CPT | Performed by: STUDENT IN AN ORGANIZED HEALTH CARE EDUCATION/TRAINING PROGRAM

## 2020-04-16 PROCEDURE — 84145 PROCALCITONIN (PCT): CPT | Performed by: STUDENT IN AN ORGANIZED HEALTH CARE EDUCATION/TRAINING PROGRAM

## 2020-04-16 PROCEDURE — 99233 SBSQ HOSP IP/OBS HIGH 50: CPT | Performed by: INTERNAL MEDICINE

## 2020-04-16 PROCEDURE — 93010 ELECTROCARDIOGRAM REPORT: CPT | Performed by: INTERNAL MEDICINE

## 2020-04-16 RX ORDER — ACETAMINOPHEN 650 MG/1
650 SUPPOSITORY RECTAL ONCE
Status: COMPLETED | OUTPATIENT
Start: 2020-04-16 | End: 2020-04-16

## 2020-04-16 RX ORDER — LIDOCAINE HYDROCHLORIDE 20 MG/ML
15 SOLUTION OROPHARYNGEAL 4 TIMES DAILY PRN
Status: DISCONTINUED | OUTPATIENT
Start: 2020-04-16 | End: 2020-04-22 | Stop reason: HOSPADM

## 2020-04-16 RX ORDER — ACETAMINOPHEN 650 MG/1
650 SUPPOSITORY RECTAL EVERY 4 HOURS PRN
Status: DISCONTINUED | OUTPATIENT
Start: 2020-04-16 | End: 2020-04-17

## 2020-04-16 RX ADMIN — HYDROXYCHLOROQUINE SULFATE 400 MG: 200 TABLET, FILM COATED ORAL at 10:11

## 2020-04-16 RX ADMIN — OXYCODONE HYDROCHLORIDE AND ACETAMINOPHEN 1000 MG: 500 TABLET ORAL at 16:53

## 2020-04-16 RX ADMIN — AZITHROMYCIN 250 MG: 500 TABLET, FILM COATED ORAL at 10:10

## 2020-04-16 RX ADMIN — ACETAMINOPHEN 650 MG: 650 SUPPOSITORY RECTAL at 05:03

## 2020-04-16 RX ADMIN — ENOXAPARIN SODIUM 40 MG: 40 INJECTION SUBCUTANEOUS at 10:12

## 2020-04-16 RX ADMIN — ACETAMINOPHEN 650 MG: 650 SUPPOSITORY RECTAL at 16:52

## 2020-04-16 RX ADMIN — ZINC SULFATE 220 MG (50 MG) CAPSULE 220 MG: CAPSULE at 10:11

## 2020-04-16 RX ADMIN — AMPICILLIN SODIUM AND SULBACTAM SODIUM 3 G: 10; 5 INJECTION, POWDER, FOR SOLUTION INTRAVENOUS at 16:51

## 2020-04-16 RX ADMIN — AMPICILLIN SODIUM AND SULBACTAM SODIUM 3 G: 10; 5 INJECTION, POWDER, FOR SOLUTION INTRAVENOUS at 10:12

## 2020-04-16 RX ADMIN — SODIUM CHLORIDE 100 ML/HR: 0.9 INJECTION, SOLUTION INTRAVENOUS at 03:18

## 2020-04-16 RX ADMIN — ACETAMINOPHEN 650 MG: 325 TABLET ORAL at 10:11

## 2020-04-16 RX ADMIN — Medication 200 MG: at 16:52

## 2020-04-16 RX ADMIN — OXYCODONE HYDROCHLORIDE AND ACETAMINOPHEN 1000 MG: 500 TABLET ORAL at 10:10

## 2020-04-16 RX ADMIN — LIDOCAINE HYDROCHLORIDE 15 ML: 20 SOLUTION ORAL; TOPICAL at 16:56

## 2020-04-16 RX ADMIN — AMPICILLIN SODIUM AND SULBACTAM SODIUM 3 G: 10; 5 INJECTION, POWDER, FOR SOLUTION INTRAVENOUS at 03:17

## 2020-04-16 NOTE — ASSESSMENT & PLAN NOTE
Lab Results   Component Value Date    HGBA1C 8 3 (H) 04/15/2020       Recent Labs     04/15/20  2017 04/16/20  0639 04/16/20  1150   POCGLU 183* 206* 209*       Blood Sugar Average: Last 72 hrs:  Hemoglobin A1c 8 3  Present on admission with history of diabetes  Hold metformin and sliding scale coverage

## 2020-04-16 NOTE — QUICK NOTE
I have reviewed this patient's chart both yesterday and today  This patient has a baseline history history of dementia as well as other chronic condition of diabetes hypertension and now with Lukas RINALDI on top of a tooth abscess, on top of a COVID-19 infection  Neurology was initially asked to see this patient for an altered mental status  In discussing this patient with both the nursing staff, the speech therapist, as well as Internal Medicine, we will continue to offer supportive care in terms of her COVID-19 infection over the next 24 hours and see if she continues to improve as she already has had some improvement in the last 24 hours  In light of her COVID-19 status we will trying hold off on her neurology consult at this time  This was discussed with both the Internal Medicine attending as well as the neurology attending

## 2020-04-16 NOTE — PLAN OF CARE
Summary   Pt presented with refusal of all foods (ice cream, pudding, applesauce) and cold drinks  She took only 1 small sip of warm tea and held material in her mouth for ~30 secs  She frequently called out "Oracio Arambula " Support offered  Recommendations:   1  Continue daily SLP attempts at swallowing evaluation (will offer smooth puree & nectar thick liquids to begin for maximized ease and safety    Recommended Form of Meds: non-oral if possible

## 2020-04-16 NOTE — PROGRESS NOTES
Progress Note - Alexis Woodruff 1945, 69 y o  female MRN: [id number]    Unit/Bed#: -01 Encounter: [id number]    Primary Care Provider: No primary care provider on file  Date and time admitted to hospital: 4/14/2020 10:12 AM        COVID-19 virus detected  Assessment & Plan  Infectious disease on board  Continue isolation  Continue hydroxychloroquine and azithromycin  Hydroxychloroquine changed to suspension  Monitor QTC  Today EKG showed   Follow inflammatory markers  Consider steroid if worsen  Currently patient not requiring any oxygen  AMS (altered mental status)  Assessment & Plan  Metabolic encephalopathy, POA, likely due to dental abscesses and COVID 19 infection  Mental status slightly improving  CT head negative on admission  Neurology team on board also  Patient not eating currently, she holds her food in mouth  Follow speech recommendations  Still remains NPO  Continue IV fluid  Discussed with daughter    Abnormal finding on CT scan  Assessment & Plan  CT head report noted for incidentally noted 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 per family  Daughter [facility] states that patient had a fall in [facility] approximately 4 years ago and having right eye injury and patient was seen by ophthalmologist but she never followed up for surgery per daughter  Currently she is almost plan right eye per daughter  Outpatient follow-up  JULEE (acute kidney injury) ([facility] [address] )  Assessment & Plan  Suspected acute kidney injury with creatinine 1 72 present admission  Patient lives in [place] and no previous labs or records available to review  Currently creatinine 1 30  Monitor renal function while inpatient    HTN (hypertension)  Assessment & Plan  Present admission with history of hypertension  Home medication includes Diovan  Hold Diovan in the settings of suspected acute kidney injury  Monitor vitals,  IV Lopressor p r n        DM (diabetes mellitus) [facility])  Assessment & Plan  Lab Results   Component Value Date    HGBA1C 8 3 (H) 04/15/2020       Recent Labs     04/15/20  2017 04/16/20  0639 04/16/20  1150   POCGLU 183* 206* 209*       Blood Sugar Average: Last 72 hrs:  Hemoglobin A1c 8 3  Present on admission with history of diabetes  Hold metformin and sliding scale coverage  * Fever  Assessment & Plan  78-year-old female past medical history of diabetes, hypertension, lives in [place] but has been living with daughter for about a month in [place]  Patient is currently poor historian, poor insight  History obtained from daughter over the phone  Daughter stated that patient has not been eating adequately with poor p o  Intake lately and has been complaining of having tooth pain for quite some time  On presentation she is noted to have high-grade fevers  Likely secondary to COVID- 19  UA negative  Chest x-ray without any acute finding  CT head negative for acute finding, 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  Patient also has tooth infection  Evaluated by Infectious Disease and oral surgeon  Antibiotics switched to IV Unasyn per ID  Follow-up with pending blood cultures  So far negative  IV hydration   Continue supportive care  F/u With ID and OMFS recommendations  VTE Pharmacologic Prophylaxis:   Pharmacologic: Enoxaparin (Lovenox)  Mechanical VTE Prophylaxis in Place: Yes    Patient Centered Rounds: I have performed bedside rounds with nursing staff today  Discussions with Specialists or Other Care Team Provider:  Infectious Disease, Neurology    Education and Discussions with Family / Patient:  Daughter    Time Spent for Care: More than 50% of total time spent on counseling and coordination of care as described above      Current Length of Stay: 2 day(s)    Current Patient Status: Inpatient   Certification Statement: The patient will continue to require additional inpatient hospital stay due to Fever    Discharge Plan:  Not clear yet    Code Status: Level 1 - Full Code      Subjective:   I have seen the patient bedside this morning  Patient lying on bed  She is alert but not oriented  Denies any chest pain or shortness of breath  Having fever overnight  Objective:     Vitals:   Temp (24hrs), Av 5 °F (38 1 °C), Min:99 6 °F (37 6 °C), Max:101 8 °F (38 8 °C)    Temp:  [99 6 °F (37 6 °C)-101 8 °F (38 8 °C)] 99 6 °F (37 6 °C)  HR:  [73-89] 88  Resp:  [16-24] 16  BP: (123-143)/() 139/63  SpO2:  [93 %-98 %] 94 %  Body mass index is 20 28 kg/m²  Input and Output Summary (last 24 hours): Intake/Output Summary (Last 24 hours) at 2020 1350  Last data filed at 2020 0453  Gross per 24 hour   Intake 2561 67 ml   Output --   Net 2561 67 ml       Physical Exam:     Physical Exam  Limited due to pandemic COVID-19 infection      General- Awake, alert, lying on bed  Respiratory system- breathing on room air, not using any accessory muscles  Psych- No acute psychosis  CNS- moving all extremities    Additional Data:     Labs:    Results from last 7 days   Lab Units 20  0515 04/15/20  0544 20  1118   WBC Thousand/uL 9 43 6 84 5 13   HEMOGLOBIN g/dL 9 8* 9 5* 10 4*   HEMATOCRIT % 33 0* 31 4* 34 9   PLATELETS Thousands/uL 198 174 168   BANDS PCT %  --  3  --    NEUTROS PCT %  --   --  79*   LYMPHS PCT %  --   --  15   LYMPHO PCT %  --  19  --    MONOS PCT %  --   --  6   MONO PCT %  --  4  --    EOS PCT %  --  0 0     Results from last 7 days   Lab Units 20  0515   SODIUM mmol/L 146*   POTASSIUM mmol/L 4 1   CHLORIDE mmol/L 109*   CO2 mmol/L 21   BUN mg/dL 22   CREATININE mg/dL 1 30   ANION GAP mmol/L 16*   CALCIUM mg/dL 8 5   ALBUMIN g/dL 2 6*   TOTAL BILIRUBIN mg/dL 0 40   ALK PHOS U/L 64   ALT U/L 14   AST U/L 29   GLUCOSE RANDOM mg/dL 187*         Results from last 7 days   Lab Units 20  1150 20  0639 04/15/20  2017   POC GLUCOSE mg/dl 209* 206* 183*     Results from last 7 days   Lab Units 04/15/20  0544   HEMOGLOBIN A1C % 8 3*     Results from last 7 days   Lab Units 04/16/20  0515 04/14/20  2245 04/14/20  1124   LACTIC ACID mmol/L  --   --  1 2   PROCALCITONIN ng/ml 2 67* 0 46*  --            * I Have Reviewed All Lab Data Listed Above  * Additional Pertinent Lab Tests Reviewed: All Labs Within Last 24 Hours Reviewed    Imaging:    Imaging Reports Reviewed Today Include:   Imaging Personally Reviewed by Myself Includes:      Recent Cultures (last 7 days):     Results from last 7 days   Lab Units 04/14/20  1124 04/14/20  1118   BLOOD CULTURE  No Growth at 24 hrs  No Growth at 24 hrs  Last 24 Hours Medication List:     Current Facility-Administered Medications:  acetaminophen 650 mg Rectal Q4H PRN Haylie Hendrickson MD    ampicillin-sulbactam 3 g Intravenous Q6H Elidia Tucker MD Last Rate: 3 g (04/16/20 1012)   ascorbic acid 1,000 mg Oral BID Murphy DUNHAM MD    azithromycin 250 mg Oral Q24H Murphy DUNHAM MD    azithromycin 500 mg Oral Once Murphy DUNHAM MD    enoxaparin 40 mg Subcutaneous Q24H [address] Murphy DUNHAM MD    hydroxychloroquine 200 mg Oral Q12H Haylie Hendrickson MD    insulin lispro 1-5 Units Subcutaneous 4 times day Rosanna Jacinto MD    insulin lispro 1-5 Units Subcutaneous HS Rosanna Jacinto MD    Lidocaine Viscous HCl 15 mL Swish & Spit 4x Daily PRN Haylie Hendrickson MD    sodium chloride 75 mL/hr Intravenous Continuous Haylie Hendrickson MD Last Rate: 100 mL/hr (04/16/20 0318)   zinc sulfate 220 mg Oral Daily Sharyle Rote, MD         Today, Patient Was Seen By: Crescencio Carrington MD    ** Please Note: Dictation voice to text software may have been used in the creation of this document   **

## 2020-04-16 NOTE — ASSESSMENT & PLAN NOTE
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

## 2020-04-16 NOTE — ASSESSMENT & PLAN NOTE
Metabolic encephalopathy, POA, likely due to dental abscesses and COVID 19 infection  Mental status slightly improving  CT head negative on admission  Neurology team on board also  Patient not eating currently, she holds her food in mouth  Follow speech recommendations  Still remains NPO  Continue IV fluid    Discussed with daughter

## 2020-04-16 NOTE — PROGRESS NOTES
Change of shift: patient incontinent of urine, pads changed and repositioned  Patient answering questions and following commands, alert to self  Bed alarm on  IV site intact and infusing

## 2020-04-16 NOTE — SPEECH THERAPY NOTE
Speech-Language Pathology Bedside Swallow Evaluation      Patient Name: Isak Rob    SFCQA'N Date: 4/16/2020     Problem List  Principal Problem:    Fever  Active Problems:    DM (diabetes mellitus) ([facility] [address] )    HTN (hypertension)    [facility]    Abnormal finding on CT scan    AMS (altered mental status)      Past Medical History  Past Medical History:   Diagnosis Date    Cancer ([facility] )     BREAST    Diabetes mellitus ([facility] )     Hypertension        Past Surgical History  Past Surgical History:   Procedure Laterality Date    BREAST SURGERY         Summary   Pt presented with refusal of all foods (ice cream, pudding, applesauce) and cold drinks  She took only 1 small sip of warm tea and held material in her mouth for ~30 secs  She frequently called out "Oracio Arambula " Support offered  Recommendations:   1  Continue daily SLP attempts at swallowing evaluation (will offer smooth puree & nectar thick liquids to begin for maximized ease and safety  Recommended Form of Meds: non-oral if possible         Current Medical Status  Isak Rob is a 78yo female who lived in the [place] until 1 mo ago with a PMHx significant for type II DM htn, hld and anemia presenting to the ED 4/14 for evaluation of worsening dental pain, subjective fevers,AMS,  poor PO intake x 3 days, difficulty swallowing her medications (has only been drinking tea for the past few days)  DX: fever (to 103), +Covid 19, AMS, JULEE, htn  SHe is s/p Oral and Maxillofacial Evaluation (Dentition unlikely source of fever without any facial edema and no leukocytosis present)  Placement of NG tube attempted last pm but unable to be advanced  Pt NPO and SLP Swallowing Evaluation ordered at this time  Process begun bedside immediately after am care with IMAN Mckeon Precautions: Covid19/Airborne/Isolation    Past medical history:  Please see H&P for details    Special Studies of 4/14:  CT of chest/abd/pelvis: 1    Few airspace opacities at the base of the left lower lobe and less prominently in the right lower lobe may represent mild aspiration or pneumonia  2   No evidence of bowel obstruction, colitis, diverticulitis or appendicitis  CT of facial bones:    1  Right maxillary canine and 2nd molar periapical abscesses  No evidence of adjacent subperiosteal abscess or cellulitis  2   Mild anterior subluxation of the right mandibular condyle may represent sequela of degenerative change  Social/Education/Vocational Hx:  Pt lived in the [place] but came to stay with dtr Jamilah Shea with outbreak of Covid-19    Swallow Information   Current Risks for Dysphagia & Aspiration: AMS, oral discomfort  Current Diet: NPO   Baseline Diet: diet from past few months unknown but pt with POOR intake limited to liquids in very recent past by report      Baseline Assessment   Behavior/Cognition: decreased attention and low alertness level  Speech/Language Status: did not follow simple commands, no verbal output noted except "Oh Ralf"  Patient Positioning: upright in bed  Pain Status/Interventions/Response to Interventions: Occasionally placed R hand to R side fo face     Swallow Mechanism Exam  Facial: No gross asymmetry but some "shivering"  Lingual: unable to test 2/2 limited command following  Velum: unable to visualize  Mandible: unable to test 2/2 limited command following  Dentition: partial natural dentition visualized  Volitional Cough: unable to initiate volitional cough   Respiratory Status: on RA       Consistencies Assessed and Performance   Consistencies/Materials offered: ice cream, pudding, applesauce, nectar thick cranberry juice, water, hot tea w/sugar    Oral Stage:   Pt pursed lips and turned head to refuse most material   She also attempted to wipe lips clean of the minimal ice cream (etc) on lips    She accepted only 1 v small sip of warm tea then held material in mouth for 20-30 secs prior to initiating a swallow    Pharyngeal Stage: Swallow Mechanics:  Swallowing initiation appeared significantly delayed  Laryngeal rise was palpated and judged to be within functional limits  No cough noted with the 1 very small sip accepted  Esophageal Concerns: unknown    Strategies and Efficacy: pt not responsive to support, encouragement, placing cup in her hand etc    Summary and Recommendations (see above)    Results Reviewed with: RN     Treatment Recommended: yes will attempt again tomorrow am (then as indicated 3-5 x weekly     Patient Stated Goal: Did not state     Dysphagia LTG per SLP  -Patient will demonstrate optimum safety and efficacy of oral intake and swallowing function without overt s/sx of penetration or aspiration for the highest appropriate diet level       Prognosis: guarded

## 2020-04-16 NOTE — ASSESSMENT & PLAN NOTE
CT head report noted for incidentally noted 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 per family  Daughter Sobeida Keith states that [facility] a [facility] 4 years ago and having right eye injury and patient was seen by ophthalmologist but she never followed up for surgery per daughter  Currently she is almost plan right eye per daughter  Outpatient follow-up

## 2020-04-16 NOTE — PROGRESS NOTES
Tried to place NG tube after patient was given Ativan but she resisted and was unable to advance tube pass her throat  Gwen Donis made aware

## 2020-04-16 NOTE — PROGRESS NOTES
Progress Note - Infectious Disease   Nela Davila 69 y o  female MRN: [id number]  Unit/Bed#: -01 Encounter: [id number]      Impression/Recommendations:  1  COVID pneumonia  Although initially, patient's family denies any traveling for patient since she moved out of [address] [place] [address] in early February  However, per Dr Alia Ng note, patient traveled back to [place] 2 weeks ago  This clearly was a risk for COVID  Regardless, given her age, she is at risk for progression  Therefore, treatment with hydroxychloroquine/azithromycin were started last night  Unfortunately, patient refused all p o  Intake  NG tube was attempted but unsuccessful  Will continue to try to encourage p o  medication  Patient's CRP is elevated blood for family mildly elevated  In addition, with patient not on O2 support, 0 4 of off on systemic corticosteroid for now  QTC in normal range  Continue to try to give patient HQ/azithromycin  Monitor respiratory symptoms  Monitor temperature/WBC  Monitor inflammatory markers  Monitor respiratory symptoms  Hold off on systemic corticosteroid for now  Monitor QTC with patient on HQ/azithromycin      2  Right maxillary tooth abscesses  These are clearly etiology of patient's right upper jaw pain and refusal to eat  For now, we keep patient on systemic antibiotic  She was subsequently knee teeth extraction but this can wait until after COVID has resolved  Patient remains systemically well  Admission blood cultures remain negative  Continue IV Unasyn  Monitor temperature/WBC  Monitor jaw pain  Follow-up on pending blood cultures      3  Encephalopathy  It is unclear what her baseline mental status is  Regardless, patient's encephalopathy is most likely metabolic, secondary to tooth abscesses and COVID  Head CT without acute changes  There are no clinical signs of CNS infection  Antibiotic plan as in above  Monitor mental status      4   JULEE, POA, likely secondary to prerenal state from decreased p o  intake  Creatinine much improved with IV fluid hydration  Antibiotic at full dose for now  Monitor creatinine      5  DM, poorly controlled, with elevated hemoglobin A1c  This is risk factor for infections above  Management per primary service      Discussed with patient  Discussed with Dr Pastor Cook from [facility] service  I spent 45 minutes on the unit floor, of which 25 minutes were spent in counseling and coordination of care, as outlined above  Antibiotics:  Unasyn # 2  HQ/azithromycin # 1     Subjective:  Patient's COVID PCR came back positive  HQ/azithromycin was started last night but patient refused p o  Intake  NG tube placement was attempted unsuccessful  At present, patient remains lethargic and very poorly arousable  She is not communicative  Temperature trending down  Objective:  Vitals:  Temp:  [99 6 °F (37 6 °C)-101 8 °F (38 8 °C)] 99 6 °F (37 6 °C)  HR:  [73-89] 88  Resp:  [16-24] 16  BP: (123-143)/() 139/63  SpO2:  [93 %-98 %] 94 %  Temp (24hrs), Av 5 °F (38 1 °C), Min:99 6 °F (37 6 °C), Max:101 8 °F (38 8 °C)  Current: Temperature: 99 6 °F (37 6 °C)    Physical Exam:    Parts of the exam were were done by the Medicine and nursing services and discussed with me, due to the infection control issues related to the current COVID crisis  General: Lethargic, poorly arousable  Comfortable  Nontoxic  Noncommunicative  Lungs: Decreased breath sounds, no rales, no wheezing, respirations unlabored  Heart:  Regular rate and rhythm, S1 and S2 normal, no murmur  Abdomen: Soft, nondistended, non-tender, bowel sounds active all four quadrants, no masses, no organomegaly  Extremities: No edema  No erythema/warmth  No ulcer  Nontender to palpation  Skin:  No rash  Neuro: Not assessable       Invasive Devices     Peripheral Intravenous Line            Peripheral IV 20 Left Antecubital 2 days    Peripheral IV 20 Left Arm 1 day Labs studies:   I have personally reviewed pertinent labs  Results from last 7 days   Lab Units 04/16/20  0515 04/15/20  0544 04/14/20  1118   POTASSIUM mmol/L 4 1 4 4 5 3   CHLORIDE mmol/L 109* 108 103   CO2 mmol/L 21 21 22   BUN mg/dL 22 36* 56*   CREATININE mg/dL 1 30 1 30 1 72*   EGFR ml/min/1 73sq m 47 47 34   CALCIUM mg/dL 8 5 8 3 8 8   AST U/L 29 21  --    ALT U/L 14 13  --    ALK PHOS U/L 64 57  --      Results from last 7 days   Lab Units 04/16/20  0515 04/15/20  0544 04/14/20  1118   WBC Thousand/uL 9 43 6 84 5 13   HEMOGLOBIN g/dL 9 8* 9 5* 10 4*   PLATELETS Thousands/uL 198 174 168     Results from last 7 days   Lab Units 04/14/20  1124 04/14/20  1118   BLOOD CULTURE  No Growth at 24 hrs  No Growth at 24 hrs  Imaging Studies:   I have personally reviewed pertinent imaging study reports and images in PACS  EKG, Pathology, and Other Studies:   I have personally reviewed pertinent reports

## 2020-04-16 NOTE — PROGRESS NOTES
Dr Amy Arenas made aware of patient shaking in bed/elevated temps and request for Tylenol to be switched back to rectal route as there are no orders for diet and patient has been NPO

## 2020-04-16 NOTE — ASSESSMENT & PLAN NOTE
Suspected acute kidney injury with creatinine 1 72 present admission  Patient lives in [place] and no previous labs or records available to review    Currently creatinine 1 30  Monitor renal function while inpatient

## 2020-04-16 NOTE — ASSESSMENT & PLAN NOTE
Infectious disease on board  Continue isolation  Continue hydroxychloroquine and azithromycin  Hydroxychloroquine changed to suspension  Monitor QTC  Today EKG showed   Follow inflammatory markers  Consider steroid if worsen  Currently patient not requiring any oxygen  36.3

## 2020-04-17 ENCOUNTER — APPOINTMENT (INPATIENT)
Dept: CT IMAGING | Facility: HOSPITAL | Age: 75
DRG: 177 | End: 2020-04-17
Payer: MEDICARE

## 2020-04-17 PROBLEM — E87.0 HYPERNATREMIA: Status: ACTIVE | Noted: 2020-04-17

## 2020-04-17 LAB
ALBUMIN SERPL BCP-MCNC: 2.3 G/DL (ref 3.5–5)
ALP SERPL-CCNC: 62 U/L (ref 46–116)
ALT SERPL W P-5'-P-CCNC: 12 U/L (ref 12–78)
ANION GAP SERPL CALCULATED.3IONS-SCNC: 12 MMOL/L (ref 4–13)
ANION GAP SERPL CALCULATED.3IONS-SCNC: 18 MMOL/L (ref 4–13)
AST SERPL W P-5'-P-CCNC: 30 U/L (ref 5–45)
ATRIAL RATE: 75 BPM
ATRIAL RATE: 79 BPM
BILIRUB SERPL-MCNC: 0.4 MG/DL (ref 0.2–1)
BUN SERPL-MCNC: 19 MG/DL (ref 5–25)
BUN SERPL-MCNC: 21 MG/DL (ref 5–25)
CALCIUM SERPL-MCNC: 8.2 MG/DL (ref 8.3–10.1)
CALCIUM SERPL-MCNC: 8.7 MG/DL (ref 8.3–10.1)
CHLORIDE SERPL-SCNC: 113 MMOL/L (ref 100–108)
CHLORIDE SERPL-SCNC: 115 MMOL/L (ref 100–108)
CO2 SERPL-SCNC: 19 MMOL/L (ref 21–32)
CO2 SERPL-SCNC: 24 MMOL/L (ref 21–32)
CREAT SERPL-MCNC: 1.17 MG/DL (ref 0.6–1.3)
CREAT SERPL-MCNC: 1.3 MG/DL (ref 0.6–1.3)
CRP SERPL QL: 184 MG/L
ERYTHROCYTE [DISTWIDTH] IN BLOOD BY AUTOMATED COUNT: 14.3 % (ref 11.6–15.1)
FERRITIN SERPL-MCNC: 504 NG/ML (ref 8–388)
GFR SERPL CREATININE-BSD FRML MDRD: 47 ML/MIN/1.73SQ M
GFR SERPL CREATININE-BSD FRML MDRD: 53 ML/MIN/1.73SQ M
GLUCOSE SERPL-MCNC: 115 MG/DL (ref 65–140)
GLUCOSE SERPL-MCNC: 142 MG/DL (ref 65–140)
GLUCOSE SERPL-MCNC: 217 MG/DL (ref 65–140)
GLUCOSE SERPL-MCNC: 267 MG/DL (ref 65–140)
GLUCOSE SERPL-MCNC: 326 MG/DL (ref 65–140)
GLUCOSE SERPL-MCNC: 372 MG/DL (ref 65–140)
HCT VFR BLD AUTO: 29.6 % (ref 34.8–46.1)
HGB BLD-MCNC: 8.9 G/DL (ref 11.5–15.4)
MCH RBC QN AUTO: 28.1 PG (ref 26.8–34.3)
MCHC RBC AUTO-ENTMCNC: 30.1 G/DL (ref 31.4–37.4)
MCV RBC AUTO: 93 FL (ref 82–98)
NRBC BLD AUTO-RTO: 0 /100 WBCS
P AXIS: 86 DEGREES
P AXIS: 86 DEGREES
PLATELET # BLD AUTO: 199 THOUSANDS/UL (ref 149–390)
PMV BLD AUTO: 10.7 FL (ref 8.9–12.7)
POTASSIUM SERPL-SCNC: 3.4 MMOL/L (ref 3.5–5.3)
POTASSIUM SERPL-SCNC: 3.8 MMOL/L (ref 3.5–5.3)
PR INTERVAL: 138 MS
PR INTERVAL: 144 MS
PROT SERPL-MCNC: 6.9 G/DL (ref 6.4–8.2)
QRS AXIS: 66 DEGREES
QRS AXIS: 74 DEGREES
QRSD INTERVAL: 82 MS
QRSD INTERVAL: 92 MS
QT INTERVAL: 372 MS
QT INTERVAL: 390 MS
QTC INTERVAL: 426 MS
QTC INTERVAL: 435 MS
RBC # BLD AUTO: 3.17 MILLION/UL (ref 3.81–5.12)
SODIUM SERPL-SCNC: 150 MMOL/L (ref 136–145)
SODIUM SERPL-SCNC: 151 MMOL/L (ref 136–145)
T WAVE AXIS: 70 DEGREES
T WAVE AXIS: 78 DEGREES
VENTRICULAR RATE: 75 BPM
VENTRICULAR RATE: 79 BPM
WBC # BLD AUTO: 8.66 THOUSAND/UL (ref 4.31–10.16)

## 2020-04-17 PROCEDURE — 85027 COMPLETE CBC AUTOMATED: CPT | Performed by: INTERNAL MEDICINE

## 2020-04-17 PROCEDURE — 99222 1ST HOSP IP/OBS MODERATE 55: CPT | Performed by: PHYSICIAN ASSISTANT

## 2020-04-17 PROCEDURE — 70450 CT HEAD/BRAIN W/O DYE: CPT

## 2020-04-17 PROCEDURE — 93010 ELECTROCARDIOGRAM REPORT: CPT | Performed by: INTERNAL MEDICINE

## 2020-04-17 PROCEDURE — 80048 BASIC METABOLIC PNL TOTAL CA: CPT | Performed by: INTERNAL MEDICINE

## 2020-04-17 PROCEDURE — 80053 COMPREHEN METABOLIC PANEL: CPT | Performed by: INTERNAL MEDICINE

## 2020-04-17 PROCEDURE — 82728 ASSAY OF FERRITIN: CPT | Performed by: INTERNAL MEDICINE

## 2020-04-17 PROCEDURE — 86140 C-REACTIVE PROTEIN: CPT | Performed by: INTERNAL MEDICINE

## 2020-04-17 PROCEDURE — 99232 SBSQ HOSP IP/OBS MODERATE 35: CPT | Performed by: INTERNAL MEDICINE

## 2020-04-17 PROCEDURE — 82948 REAGENT STRIP/BLOOD GLUCOSE: CPT

## 2020-04-17 PROCEDURE — 92526 ORAL FUNCTION THERAPY: CPT

## 2020-04-17 PROCEDURE — 99233 SBSQ HOSP IP/OBS HIGH 50: CPT | Performed by: INTERNAL MEDICINE

## 2020-04-17 PROCEDURE — 93005 ELECTROCARDIOGRAM TRACING: CPT

## 2020-04-17 RX ORDER — ACETAMINOPHEN 650 MG/1
650 SUPPOSITORY RECTAL EVERY 4 HOURS PRN
Status: DISCONTINUED | OUTPATIENT
Start: 2020-04-17 | End: 2020-04-22 | Stop reason: HOSPADM

## 2020-04-17 RX ORDER — INSULIN GLARGINE 100 [IU]/ML
10 INJECTION, SOLUTION SUBCUTANEOUS
Status: DISCONTINUED | OUTPATIENT
Start: 2020-04-17 | End: 2020-04-18

## 2020-04-17 RX ORDER — METHYLPREDNISOLONE SODIUM SUCCINATE 40 MG/ML
40 INJECTION, POWDER, LYOPHILIZED, FOR SOLUTION INTRAMUSCULAR; INTRAVENOUS DAILY
Status: DISCONTINUED | OUTPATIENT
Start: 2020-04-17 | End: 2020-04-21

## 2020-04-17 RX ORDER — SODIUM CHLORIDE 450 MG/100ML
75 INJECTION, SOLUTION INTRAVENOUS CONTINUOUS
Status: DISCONTINUED | OUTPATIENT
Start: 2020-04-17 | End: 2020-04-17

## 2020-04-17 RX ORDER — DEXTROSE MONOHYDRATE 50 MG/ML
50 INJECTION, SOLUTION INTRAVENOUS CONTINUOUS
Status: DISCONTINUED | OUTPATIENT
Start: 2020-04-17 | End: 2020-04-18

## 2020-04-17 RX ORDER — DEXTROSE MONOHYDRATE 50 MG/ML
75 INJECTION, SOLUTION INTRAVENOUS CONTINUOUS
Status: DISCONTINUED | OUTPATIENT
Start: 2020-04-17 | End: 2020-04-17

## 2020-04-17 RX ADMIN — ACETAMINOPHEN 650 MG: 650 SUPPOSITORY RECTAL at 19:36

## 2020-04-17 RX ADMIN — INSULIN GLARGINE 10 UNITS: 100 INJECTION, SOLUTION SUBCUTANEOUS at 22:26

## 2020-04-17 RX ADMIN — METHYLPREDNISOLONE SODIUM SUCCINATE 40 MG: 40 INJECTION, POWDER, FOR SOLUTION INTRAMUSCULAR; INTRAVENOUS at 16:52

## 2020-04-17 RX ADMIN — AMPICILLIN SODIUM AND SULBACTAM SODIUM 3 G: 10; 5 INJECTION, POWDER, FOR SOLUTION INTRAVENOUS at 11:33

## 2020-04-17 RX ADMIN — DEXTROSE 75 ML/HR: 5 SOLUTION INTRAVENOUS at 16:56

## 2020-04-17 RX ADMIN — ENOXAPARIN SODIUM 40 MG: 40 INJECTION SUBCUTANEOUS at 09:17

## 2020-04-17 RX ADMIN — INSULIN LISPRO 3 UNITS: 100 INJECTION, SOLUTION INTRAVENOUS; SUBCUTANEOUS at 22:27

## 2020-04-17 RX ADMIN — ZINC SULFATE 220 MG (50 MG) CAPSULE 220 MG: CAPSULE at 09:14

## 2020-04-17 RX ADMIN — AZITHROMYCIN 250 MG: 500 TABLET, FILM COATED ORAL at 09:14

## 2020-04-17 RX ADMIN — Medication 200 MG: at 16:47

## 2020-04-17 RX ADMIN — Medication 200 MG: at 05:17

## 2020-04-17 RX ADMIN — SODIUM CHLORIDE 75 ML/HR: 0.45 INJECTION, SOLUTION INTRAVENOUS at 15:42

## 2020-04-17 RX ADMIN — INSULIN LISPRO 4 UNITS: 100 INJECTION, SOLUTION INTRAVENOUS; SUBCUTANEOUS at 12:33

## 2020-04-17 RX ADMIN — AMPICILLIN SODIUM AND SULBACTAM SODIUM 3 G: 10; 5 INJECTION, POWDER, FOR SOLUTION INTRAVENOUS at 16:56

## 2020-04-17 RX ADMIN — AMPICILLIN SODIUM AND SULBACTAM SODIUM 3 G: 10; 5 INJECTION, POWDER, FOR SOLUTION INTRAVENOUS at 22:25

## 2020-04-17 RX ADMIN — DEXTROSE 75 ML/HR: 5 SOLUTION INTRAVENOUS at 09:15

## 2020-04-17 RX ADMIN — AMPICILLIN SODIUM AND SULBACTAM SODIUM 3 G: 10; 5 INJECTION, POWDER, FOR SOLUTION INTRAVENOUS at 05:16

## 2020-04-17 RX ADMIN — SODIUM CHLORIDE 75 ML/HR: 0.9 INJECTION, SOLUTION INTRAVENOUS at 04:20

## 2020-04-17 RX ADMIN — ACETAMINOPHEN 650 MG: 650 SUPPOSITORY RECTAL at 04:08

## 2020-04-17 RX ADMIN — ACETAMINOPHEN 650 MG: 650 SUPPOSITORY RECTAL at 15:17

## 2020-04-17 RX ADMIN — OXYCODONE HYDROCHLORIDE AND ACETAMINOPHEN 1000 MG: 500 TABLET ORAL at 09:14

## 2020-04-17 NOTE — ASSESSMENT & PLAN NOTE
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

## 2020-04-17 NOTE — SPEECH THERAPY NOTE
SLP Swallow Progress Note    Patient Name: Francisco Lynn  LMQOP'T Date: 4/17/2020     Problem List  Principal Problem:    Fever  Active Problems:    DM (diabetes mellitus) ([facility] [address] )    HTN (hypertension)    [facility]    Abnormal finding on CT scan    AMS (altered mental status)    COVID-19 virus detected    Subjective:  Pt turned her head away from most foods/liquids presented  Objective: Attempts at continued swallowing evaluation made  I positioned pt upright in bed with head supported by pillow  I offered numerous foods but pt withdrew from most materials placed to lips  On occasion, I was able to place material on lips, but pt presented with no retrieval of material from lips  At times, she left material (eg ice cream and sherbet) on lips which spilled over time)  Other times, she wiped materials off of her lips  I spoke c RN who was able to provide pt with medications crushed on one occasion  Assessment:  Pt presented with MINIMAL to no engagement  She most frequently turned her head away from material placed to lips and/or wiped material from her lips  She occasionally allowed material to be placed on lips but did NOT retrieve material or swallow  She did take pills crushed in small amt of puree x1 for RN  Aware that NG tube was unable to be placed  Plan/Recommendations:  Continue discussion with family re: status and plan of care  Consider order for pureed food and nectar thick liquid and medications crushed AS DESIRED AND TOLERATED

## 2020-04-17 NOTE — ASSESSMENT & PLAN NOTE
CT head report noted for incidentally noted 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 per family  Daughter Thomas Lubin states that patient had a fall in [facility] approximately 4 years ago and having right eye injury and patient was seen by ophthalmologist but she never followed up for surgery per daughter  Currently she is almost plan right eye per daughter  Outpatient follow-up

## 2020-04-17 NOTE — PROGRESS NOTES
Progress Note - Shawn Kent 1945, 69 y o  female MRN: [id number]    Unit/Bed#: -01 Encounter: [id number]    Primary Care Provider: No primary care provider on file  Date and time admitted to hospital: 4/14/2020 10:12 AM        Hypernatremia  Assessment & Plan  Sodium 150 today  Likely from dehydration  Initially she was started on D5W but blood sugar went up  Change IV fluid to half-normal saline  Recheck BMP this afternoon  COVID-19 virus detected  Assessment & Plan  Infectious disease on board  Continue isolation  Continue hydroxychloroquine and azithromycin  Hydroxychloroquine changed to suspension  Monitor QTC  Follow inflammatory markers  Consider steroid if worsen  Currently patient not requiring any oxygen  AMS (altered mental status)  Assessment & Plan  Metabolic encephalopathy, POA, likely due to dental abscesses and COVID 19 infection  Mental status slightly improving  CT head negative on admission  Neurology team on board also  Repeat CT head  Patient not eating currently, she holds her food in mouth  Follow speech recommendations  Today she agreed to eat  Will change diet to pureed diet    Abnormal finding on CT scan  Assessment & Plan  CT head report noted for incidentally noted 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 per family  Daughter Dima Grant states that [facility] and having right eye injury and patient was seen by ophthalmologist but she never followed up for surgery per daughter  Currently she is almost plan right eye per daughter  Outpatient follow-up  JULEE (acute kidney injury) ([facility] [address] )  Assessment & Plan  Suspected acute kidney injury with creatinine 1 72 present admission  Patient lives in [place] and no previous labs or records available to review  Creatinine improving  1 17 today      HTN (hypertension)  Assessment & Plan  Present admission with history of hypertension  Home medication includes Diovan  Hold Diovan in the settings of suspected acute kidney injury  Monitor vitals,  IV Lopressor p r n  DM (diabetes mellitus) [facility])  Assessment & Plan  Lab Results   Component Value Date    HGBA1C 8 3 (H) 04/15/2020       Recent Labs     04/16/20  1551 04/16/20  2039 04/17/20  0551 04/17/20  1040   POCGLU 193* 189* 267* 372*       Blood Sugar Average: Last 72 hrs:  Hemoglobin A1c 8 3  Present on admission with history of diabetes  Hold metformin and sliding scale coverage  Blood sugar high today because of D5W  With change IV fluid to half-normal saline  * Fever  Assessment & Plan  50-year-old female past medical history of diabetes, hypertension, lives in [place] but has been living with daughter for about a month in [address] [place] [address]  Patient is currently poor historian, poor insight  History obtained from daughter over the phone  Daughter stated that patient has not been eating adequately with poor p o  Intake lately and has been complaining of having tooth pain for quite some time  On presentation she is noted to have high-grade fevers  Likely secondary to COVID- 19  UA negative  Chest x-ray without any acute finding  CT head negative for acute finding, 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  Patient also has tooth infection  Evaluated by Infectious Disease and oral surgeon  Antibiotics switched to IV Unasyn per ID  Follow-up with pending blood cultures  So far negative  IV hydration   Continue supportive care  F/u With ID and OMFS recommendations  VTE Pharmacologic Prophylaxis:   Pharmacologic: Enoxaparin (Lovenox)  Mechanical VTE Prophylaxis in Place: Yes    Patient Centered Rounds: I have performed bedside rounds with nursing staff today  Discussions with Specialists or Other Care Team Provider:  Neurology    Education and Discussions with Family / Patient:  Called daughter  Time Spent for Care:    More than 50% of total time spent on counseling and coordination of care as described above  Current Length of Stay: 3 day(s)    Current Patient Status: Inpatient   Certification Statement: The patient will continue to require additional inpatient hospital stay due to Fever    Discharge Plan:  Not clear yet    Code Status: Level 1 - Full Code      Subjective:   I have seen the patient bedside this morning  Patient lying on bed  Complaining about tooth pain  Her mental status much improved today  No chest pain or shortness of breath    Objective:     Vitals:   Temp (24hrs), Av 3 °F (37 9 °C), Min:98 1 °F (36 7 °C), Max:103 3 °F (39 6 °C)    Temp:  [98 1 °F (36 7 °C)-103 3 °F (39 6 °C)] 103 3 °F (39 6 °C)  HR:  [77-84] 77  Resp:  [20-26] 26  BP: (127-151)/(59-76) 133/63  SpO2:  [94 %-96 %] 95 %  Body mass index is 20 28 kg/m²  Input and Output Summary (last 24 hours): Intake/Output Summary (Last 24 hours) at 2020 1502  Last data filed at 2020 1300  Gross per 24 hour   Intake 800 ml   Output --   Net 800 ml       Physical Exam:     Physical Exam  Limited due to pandemic COVID-19 infection  General- Awake, alert, holding her hand to the face for tooth pain  Respiratory system- breathing on room air, not using any accessory muscles  Psych- No acute psychosis  CNS- moving all extremities    Additional Data:     Labs:    Results from last 7 days   Lab Units 20  0548  04/15/20  0544 20  1118   WBC Thousand/uL 8 66   < > 6 84 5 13   HEMOGLOBIN g/dL 8 9*   < > 9 5* 10 4*   HEMATOCRIT % 29 6*   < > 31 4* 34 9   PLATELETS Thousands/uL 199   < > 174 168   BANDS PCT %  --   --  3  --    NEUTROS PCT %  --   --   --  79*   LYMPHS PCT %  --   --   --  15   LYMPHO PCT %  --   --  19  --    MONOS PCT %  --   --   --  6   MONO PCT %  --   --  4  --    EOS PCT %  --   --  0 0    < > = values in this interval not displayed       Results from last 7 days   Lab Units 20  0548   SODIUM mmol/L 150*   POTASSIUM mmol/L 3 8 CHLORIDE mmol/L 113*   CO2 mmol/L 19*   BUN mg/dL 21   CREATININE mg/dL 1 17   ANION GAP mmol/L 18*   CALCIUM mg/dL 8 2*   ALBUMIN g/dL 2 3*   TOTAL BILIRUBIN mg/dL 0 40   ALK PHOS U/L 62   ALT U/L 12   AST U/L 30   GLUCOSE RANDOM mg/dL 217*         Results from last 7 days   Lab Units 04/17/20  1040 04/17/20  0551 04/16/20  2039 04/16/20  1551 04/16/20  1150 04/16/20  0639 04/15/20  2017   POC GLUCOSE mg/dl 372* 267* 189* 193* 209* 206* 183*     Results from last 7 days   Lab Units 04/15/20  0544   HEMOGLOBIN A1C % 8 3*     Results from last 7 days   Lab Units 04/16/20  0515 04/14/20  2245 04/14/20  1124   LACTIC ACID mmol/L  --   --  1 2   PROCALCITONIN ng/ml 2 67* 0 46*  --            * I Have Reviewed All Lab Data Listed Above  * Additional Pertinent Lab Tests Reviewed: All Labs Within Last 24 Hours Reviewed    Imaging:    Imaging Reports Reviewed Today Include:   Imaging Personally Reviewed by Myself Includes:      Recent Cultures (last 7 days):     Results from last 7 days   Lab Units 04/14/20  1124 04/14/20  1118   BLOOD CULTURE  No Growth at 48 hrs  No Growth at 48 hrs         Last 24 Hours Medication List:     Current Facility-Administered Medications:  acetaminophen 650 mg Rectal Q4H PRN Sunil Smith MD    ampicillin-sulbactam 3 g Intravenous Q6H Sunil mSith MD Last Rate: 3 g (04/17/20 1133)   ascorbic acid 1,000 mg Oral BID Murphy DUNHAM MD    azithromycin 250 mg Oral Q24H Murphy DUNHAM MD    azithromycin 500 mg Oral Once Murhpy DUNHAM MD    enoxaparin 40 mg Subcutaneous Q24H [address] Murphy DUNHAM MD    hydroxychloroquine 200 mg Oral Q12H Sunil Smith MD    insulin lispro 1-5 Units Subcutaneous 4 times day Tish Hull MD    insulin lispro 1-5 Units Subcutaneous HS Tish Hull MD    Lidocaine Viscous HCl 15 mL Swish & Spit 4x Daily PRN Sunil Smith MD    sodium chloride 75 mL/hr Intravenous Continuous Sunil Smith MD    zinc sulfate 220 mg Oral Daily Kaleigh Deng MD Today, Patient Was Seen By: Toyin Orozco MD    ** Please Note: Dictation voice to text software may have been used in the creation of this document   **

## 2020-04-17 NOTE — PROGRESS NOTES
Progress Note - Infectious Disease   Jonathan Pleitez 69 y o  female MRN: [id number]  Unit/Bed#: -01 Encounter: [id number]      Impression/Recommendations:  1   COVID pneumonia  Given advanced age, patient is at risk for progression of disease  She is currently on hydroxychloroquine/azithromycin  Although patient is not hypoxic and is currently not on O2 support, given now worsening fever, rising inflammatory markers and encephalopathy, she may benefit from low-dose systemic corticosteroid  QTC in normal range  Continue  HCQ/azithromycin  Monitor respiratory symptoms  Monitor temperature/WBC  Monitor inflammatory markers  Monitor respiratory symptoms  Recommend starting low-dose systemic corticosteroid at 0 5 mg/kg/day  Monitor QTC with patient on HCQ/azithromycin      2  Right maxillary tooth abscesses   These are clearly etiology of patient's right upper jaw pain and refusal to eat   For now, we keep patient on systemic antibiotic  She will subsequently need teeth extraction but this can wait until after COVID has resolved  Patient remains systemically well  Admission blood cultures remain negative  Continue IV Unasyn  Monitor temperature/WBC  Monitor jaw pain  Follow-up on pending blood cultures      3  Encephalopathy   It is unclear what her baseline mental status is   Regardless, patient's encephalopathy is most likely metabolic, secondary to tooth abscesses and COVID   Head CT without acute changes   There are no clinical signs of CNS infection   Neurology evaluation noted  Antibiotic plan as in above  Monitor mental status      4  JULEE, POA, likely secondary to prerenal state from decreased p o  intake   Creatinine much improved with IV fluid hydration  Antibiotic at full dose for now  Monitor creatinine      5  DM, poorly controlled, with elevated hemoglobin A1c   This is risk factor for infections above  Management per primary service      Discussed with patient    Discussed with   Ryland from [facility] service  I spent 45 minutes on the unit floor, of which 25 minutes were spent in counseling and coordination of care, as outlined above  Antibiotics:  Unasyn # 3  HCQ/azithromycin # 2     Subjective:  Patient is taking p o  medication better  She remains lethargic, barely arousable  Temperature up higher today  Objective:  Vitals:  Temp:  [98 1 °F (36 7 °C)-103 3 °F (39 6 °C)] 102 3 °F (39 1 °C)  HR:  [77-84] 77  Resp:  [21-28] 28  BP: (133-151)/(63-92) 137/92  SpO2:  [94 %-96 %] 95 %  Temp (24hrs), Av 4 °F (38 °C), Min:98 1 °F (36 7 °C), Max:103 3 °F (39 6 °C)  Current: Temperature: (!) 102 3 °F (39 1 °C)    Physical Exam:    Parts of the exam were were done by the Medicine and nursing services and discussed with me, due to the infection control issues related to the current COVID crisis  General: Lethargic, barely arousable  Comfortable  Nontoxic  Lungs: Decreased breath sounds, no rales, no wheezing, respirations unlabored  Heart:  Regular rate and rhythm, S1 and S2 normal, no murmur  Abdomen: Soft, nondistended, non-tender, bowel sounds active all four quadrants, no masses, no organomegaly  Extremities: No edema  No erythema/warmth  No ulcer  Nontender to palpation  Skin:  No rash  Neuro: Moves all extremities  Invasive Devices     Peripheral Intravenous Line            Peripheral IV 20 Left Antecubital 3 days    Peripheral IV 20 Left Arm 2 days                Labs studies:   I have personally reviewed pertinent labs    Results from last 7 days   Lab Units 20  1424 20  0548 20  0515 04/15/20  0544   POTASSIUM mmol/L 3 4* 3 8 4 1 4 4   CHLORIDE mmol/L 115* 113* 109* 108   CO2 mmol/L 24 19* 21 21   BUN mg/dL 19 21 22 36*   CREATININE mg/dL 1 30 1 17 1 30 1 30   EGFR ml/min/1 73sq m 47 53 47 47   CALCIUM mg/dL 8 7 8 2* 8 5 8 3   AST U/L  --  30 29 21   ALT U/L  --  12 14 13   ALK PHOS U/L  --  62 64 57     Results from last 7 days   Lab Units 04/17/20  0548 04/16/20  0515 04/15/20  0544   WBC Thousand/uL 8 66 9 43 6 84   HEMOGLOBIN g/dL 8 9* 9 8* 9 5*   PLATELETS Thousands/uL 199 198 174     Results from last 7 days   Lab Units 04/14/20  1124 04/14/20  1118   BLOOD CULTURE  No Growth at 48 hrs  No Growth at 48 hrs  Imaging Studies:   I have personally reviewed pertinent imaging study reports and images in PACS  EKG, Pathology, and Other Studies:   I have personally reviewed pertinent reports

## 2020-04-17 NOTE — CONSULTS
Consultation - Neurology   Mario Walters 69 y o  female MRN: [id number]  Unit/Bed#: -01 Encounter: [id number]    Assessment/Plan   Assessment:  Mario Walters is a 69 y o  female with HTN, HLD, DM type 2, who presents to [facility] ED on 04/14/2020 with tooth pain and decreased PO intake, and was found to be COVID-19 positive  1  AMS, likely toxic metabolic encephalopathy in the setting of Covid positive infection, dental abscesses, JULEE, and decreased oral intake  Patient appears to have a right facial weakness (unclear if this is secondary to the dental abscess and edema), as well as absent blink to threat on the right and weakness in right upper extremity  With concern for possible infarct, will obtain repeat CT head  2  COVID-19 pneumonia  Attempted initiating hydroxychloroquine and and azithromycin, however patient is refusing PO intake due to dental abscess pain  Attempted NG tube, unsuccessful  3  Right maxillary canine and 2nd molar periapical abscesses, on IV Unasyn  Oral and maxillofacial surgery consulted, plan for extraction 2 weeks after clearing of virus  4  JULEE with an elevated creatinine of 1 72, likely secondary to decreased PO intake    5  DM type 2, appears poorly controlled with hemoglobin A1c of 8 3  Medical management per primary team    6  Hypernatremia, with most recent sodium of 150   Correction and management per primary    Plan:  -Repeat CT head pending  -Antibiotic therapy per primary team  -Normotension, euglycemia, normothermia  -Speech evaluation  -Medical management per primary team  -Continue supportive care per primary team, notify with changes    Imaging/Labs:  -CT head 04/14:  Curvilinear increased hyperdensity within the right orbital globe, could relate to intraorbital hematoma or retinal detachment; microangiopathy noted; 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 noted; tiny low density within the left basal ganglia age-indeterminate ischemia  -CXR 04/14 limited by prominent patient rotation to the left and numerous skin folds projecting over the chest wall; grossly clear lungs  No definite pneumothorax or pleural effusion  -CT facial bone 04/14 revealed right maxillary canine and 2nd molar periapical abscesses  No evidence of adjacent subperiosteal abscess or cellulitis; mild anterior subluxation of the right mandibular condyle may represent sequela degenerative change  -CT CAP wo revealed few airspace opacities at the base of the left lower lobe and less prominently in the right lower lobe may represent mild aspiration or pneumonia; no evidence of bowel obstruction, colitis, diverticulitis, or appendicitis    -Elevated creatinine on presentation 1 72, most recent 1 17  -Elevated BUN and presentation 56, most recent 21  -CRP elevated 59 9, most recent elevated 184  -Procalcitonin elevated 0 46, most recent elevated 2 67  -Hemoglobin A1c:  Elevated 8 3  -Lipid panel:  Cholesterol 182, triglycerides 109, HDL 51, LDL elevated 109  -COVID-19 positive  -Labs WNL:  Lactic acid, Initial blood cultures x2 negative, ferritin    History of Present Illness     Reason for Consult / Principal Problem:  AMS  Hx and PE limited by:  Patient is an unreliable historian, AMS  HPI: Doyle Riggs is a 69 y o   female with HTN, HLD, DM type 2, who presents to [place] [address] ED on 04/14/2020 with tooth pain and decreased PO intake, and was found to be COVID-19 positive  Patient was seen at [facility] on 04/14/2020 for poor oral intake attributed to tooth pain  Patient was sent in to [place] [address] ED from the [facility] for further management  Per discussion of primary team who spoke to patient's daughter, patient does not have a history of dementia  Upon arrival to the ED on 04/14, patient was noted to be febrile with a low-grade temp of 100 9° F which increased to 103 1 ° F  Patient was started on clindamycin in the ER, then on admission antibiotics were changed to vancomycin and cefepime    CT facial bone revealed right maxillary canine and 2nd molar periapical abscess  Patient also had a CT chest which revealed evidence of pneumonia, and patient was found to be COVID-19 positive  Of note, patient resides in the [place], however has been living with her daughter in [place] for approximately 1 month  Per chart review, patient visited [place] approximately 2 weeks ago, increasing her risk for natalia COVID  Inpatient consult to Neurology  Consult performed by: Devonte Peter PA-C  Consult ordered by: Lorena Mcleod MD        Review of Systems  12 point ROS limited to AMS  Historical Information   Past Medical History:   Diagnosis Date    Cancer ([facility] [address] )     BREAST    Diabetes mellitus ([facility] [address] )     Hypertension      Past Surgical History:   Procedure Laterality Date    BREAST SURGERY       Social History   Social History     Substance and Sexual Activity   Alcohol Use Not Currently     Social History     Substance and Sexual Activity   Drug Use Never     E-Cigarette/Vaping    E-Cigarette Use Never User      E-Cigarette/Vaping Substances     Social History     Tobacco Use   Smoking Status Never Smoker   Smokeless Tobacco Never Used     Family History: History reviewed  No pertinent family history  Review of previous medical records was completed      Meds/Allergies   all current active meds have been reviewed, current meds:   Current Facility-Administered Medications   Medication Dose Route Frequency    acetaminophen (TYLENOL) rectal suppository 650 mg  650 mg Rectal Q4H PRN    ampicillin-sulbactam (UNASYN) 3 g in sodium chloride 0 9 % 100 mL IVPB  3 g Intravenous Q6H    ascorbic acid (VITAMIN C) tablet 1,000 mg  1,000 mg Oral BID    azithromycin (ZITHROMAX) tablet 250 mg  250 mg Oral Q24H    azithromycin (ZITHROMAX) tablet 500 mg  500 mg Oral Once    enoxaparin (LOVENOX) subcutaneous injection 40 mg  40 mg Subcutaneous Q24H SNOW    hydroxychloroquine (PLAQUENIL) oral suspension 200 mg  200 mg Oral Q12H    insulin lispro (HumaLOG) 100 units/mL subcutaneous injection 1-5 Units  1-5 Units Subcutaneous 4 times day    insulin lispro (HumaLOG) 100 units/mL subcutaneous injection 1-5 Units  1-5 Units Subcutaneous HS    Lidocaine Viscous HCl (XYLOCAINE) 2 % mucosal solution 15 mL  15 mL Swish & Spit 4x Daily PRN    sodium chloride infusion 0 45 %  75 mL/hr Intravenous Continuous    zinc sulfate (ZINCATE) capsule 220 mg  220 mg Oral Daily   , PTA meds:   None    and     No Known Allergies    Objective   Vitals:Blood pressure 133/63, pulse 77, temperature 99 6 °F (37 6 °C), resp  rate (!) 26, height 5' 2" (1 575 m), weight 50 3 kg (110 lb 14 3 oz), SpO2 95 %  ,Body mass index is 20 28 kg/m²  Intake/Output Summary (Last 24 hours) at 4/17/2020 0939  Last data filed at 4/16/2020 1516  Gross per 24 hour   Intake 800 ml   Output --   Net 800 ml       Invasive Devices: Invasive Devices     Peripheral Intravenous Line            Peripheral IV 04/14/20 Left Antecubital 2 days    Peripheral IV 04/14/20 Left Arm 2 days              Physical and neurological exam completed through the Teams application via an iPad with the help of patients RN    Physical Exam   Constitutional: She appears well-developed  No distress  Does not appear well nourished   HENT:   Head: Normocephalic and atraumatic  Eyes: Conjunctivae and EOM are normal  Right eye exhibits no discharge  Left eye exhibits no discharge  No scleral icterus  Neck: Normal range of motion  Neck supple  Pulmonary/Chest: Effort normal  No respiratory distress  Skin: Skin is warm and dry  No rash noted  She is not diaphoretic  No erythema  No pallor  Nursing note and vitals reviewed  Neurologic Exam     Mental Status   Patient is alert upon entering the room, lying on her left side  Patient is oriented to person  Patient is unable to state where she is, however shakes her head no when asked if she has at home  Unable to state the year    Patient is repetitive throughout exam  Difficulty noted following both simple and complex commands  Some dysarthria noted  Able to state that it is daytime outside     Cranial Nerves     CN III, IV, VI   Extraocular motions are normal    Right facial edema and right lower facial weakness noted  Absent blink to threat on the right, present on the left  Able to look in both right and left direction, end gaze limited; no gross nystagmus noted     Motor Exam   Muscle bulk: normal  Able to elevate bilateral upper extremities off the bed without drift  Decreased  strength on the right in comparison to the left per RN  Able to elevate bilateral lower extremities off the bed, however positive give-way bilateral, appears symmetric     Sensory Exam   Sensory exam limited to AMS     Gait, Coordination, and Reflexes   Difficulty assessing coordination due to AMS and trouble with following commands       Lab Results: I have personally reviewed pertinent reports    Recent Results (from the past 24 hour(s))   Fingerstick Glucose (POCT)    Collection Time: 04/16/20  3:51 PM   Result Value Ref Range    POC Glucose 193 (H) 65 - 140 mg/dl   Fingerstick Glucose (POCT)    Collection Time: 04/16/20  8:39 PM   Result Value Ref Range    POC Glucose 189 (H) 65 - 140 mg/dl   CBC and differential    Collection Time: 04/17/20  5:48 AM   Result Value Ref Range    WBC 8 66 4 31 - 10 16 Thousand/uL    RBC 3 17 (L) 3 81 - 5 12 Million/uL    Hemoglobin 8 9 (L) 11 5 - 15 4 g/dL    Hematocrit 29 6 (L) 34 8 - 46 1 %    MCV 93 82 - 98 fL    MCH 28 1 26 8 - 34 3 pg    MCHC 30 1 (L) 31 4 - 37 4 g/dL    RDW 14 3 11 6 - 15 1 %    MPV 10 7 8 9 - 12 7 fL    Platelets 127 012 - 333 Thousands/uL    nRBC 0 /100 WBCs   Comprehensive metabolic panel    Collection Time: 04/17/20  5:48 AM   Result Value Ref Range    Sodium 150 (H) 136 - 145 mmol/L    Potassium 3 8 3 5 - 5 3 mmol/L    Chloride 113 (H) 100 - 108 mmol/L    CO2 19 (L) 21 - 32 mmol/L    ANION GAP 18 (H) 4 - 13 mmol/L    BUN 21 5 - 25 mg/dL    Creatinine 1 17 0 60 - 1 30 mg/dL    Glucose 217 (H) 65 - 140 mg/dL    Calcium 8 2 (L) 8 3 - 10 1 mg/dL    AST 30 5 - 45 U/L    ALT 12 12 - 78 U/L    Alkaline Phosphatase 62 46 - 116 U/L    Total Protein 6 9 6 4 - 8 2 g/dL    Albumin 2 3 (L) 3 5 - 5 0 g/dL    Total Bilirubin 0 40 0 20 - 1 00 mg/dL    eGFR 53 ml/min/1 73sq m   C-reactive protein    Collection Time: 04/17/20  5:48 AM   Result Value Ref Range     0 (H) <3 0 mg/L   Ferritin    Collection Time: 04/17/20  5:48 AM   Result Value Ref Range    Ferritin 504 (H) 8 - 388 ng/mL   Fingerstick Glucose (POCT)    Collection Time: 04/17/20  5:51 AM   Result Value Ref Range    POC Glucose 267 (H) 65 - 140 mg/dl   Fingerstick Glucose (POCT)    Collection Time: 04/17/20 10:40 AM   Result Value Ref Range    POC Glucose 372 (H) 65 - 140 mg/dl   ]  Imaging Studies: I have personally reviewed pertinent reports and I have personally reviewed pertinent films in PACS  EKG, Pathology, and Other Studies: I have personally reviewed pertinent reports      VTE Prophylaxis: Enoxaparin (Lovenox)

## 2020-04-17 NOTE — ASSESSMENT & PLAN NOTE
Lab Results   Component Value Date    HGBA1C 8 3 (H) 04/15/2020       Recent Labs     04/16/20  1551 04/16/20 2039 04/17/20  0551 04/17/20  1040   POCGLU 193* 189* 267* 372*       Blood Sugar Average: Last 72 hrs:  Hemoglobin A1c 8 3  Present on admission with history of diabetes  Hold metformin and sliding scale coverage  Blood sugar high today because of D5W  With change IV fluid to half-normal saline

## 2020-04-17 NOTE — ASSESSMENT & PLAN NOTE
Suspected acute kidney injury with creatinine 1 72 present admission  Patient lives in [place] and no previous labs or records available to review  Creatinine improving  1 17 today

## 2020-04-17 NOTE — UTILIZATION REVIEW
Initial Clinical Review    Admission: Date/Time/Statement: Admission Orders (From admission, onward)     Ordered        04/14/20 2127  Inpatient Admission  Once                   Orders Placed This Encounter   Procedures    Inpatient Admission     Standing Status:   Standing     Number of Occurrences:   1     Order Specific Question:   Admitting Physician     Answer:   uLc Lutz [[id number]]     Order Specific Question:   Level of Care     Answer:   Med Surg [[id number]]     Order Specific Question:   Estimated length of stay     Answer:   More than 2 Midnights     Order Specific Question:   Certification     Answer:   I certify that inpatient services are medically necessary for this patient for a duration of greater than two midnights  See H&P and MD Progress Notes for additional information about the patient's course of treatment  ED Arrival Information     Expected Arrival Acuity Means of Arrival Escorted By Service Admission Type    - 4/14/2020 10:06 Urgent Walk-In Family Member Hospitalist Urgent    Arrival Complaint    Fever, throat pain, weakness        Chief Complaint   Patient presents with    Poor Nutritional Intake     PT C/O DENTAL PAIN AND THROAT PAIN BUT HAS NOT BEEN EATING OR DRINKING FOR THE PAST COUPLE DAYS  FAMILY MEMBER IS CONCERNED ABOUT DEHYDRATION  AMBULATORY PULSE OX 93-94%     Assessment/Plan: 69year old female to the ED from home with complaints of poor po intake due to dental pain and difficuly swallowing with subjective fevers for about 3 days  SHe is from [facility]  Admitted to inpatient for fever, change in mental status  SHe arrives to the ED appearing very weak with dry mucous membranes  Pt's right maxillary lateral incisor with reproducible pain upon palpation  Tooth does not feel markedly loose in the socket  Mild edema surrounding the tooth with no appreciable erythema or purulent drainage   Mild edema of the skin overlying the tooth with no erythema/warmth to suggest facial cellulitis  She is awake and oriented to self, following commands  Baseline is alert and oriented  Noted with fever  O2 saturation is 94% on room air  Blood cultures pending  CXR shows no acute findings  IV hydration and IV ax started in the ED  CT of head shows:  noted 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 per family  COVID 19 pending  UA negative  UPdate 4/15: COVID positive  COnsult ID and OMFS for tooth/mouth infection or abscess  Continue with iv hydration  FEbrile  CT Chest shows evidence of mild aspiration or possible pneumonia  Per ID consult, CHange to IV unasyn  Patient's daughter wanting to take patient home  After lenthgy conversation, aware of implications of not being fully treated  Start on plaquenil and azithromycin  NGtube as patient is not taking anything by mouth currently     ED Triage Vitals   Temperature Pulse Respirations Blood Pressure SpO2   04/14/20 1018 04/14/20 1018 04/14/20 1018 04/14/20 1018 04/14/20 1018   (!) 97 1 °F (36 2 °C) 88 15 115/59 94 %      Temp Source Heart Rate Source Patient Position - Orthostatic VS BP Location FiO2 (%)   04/14/20 1018 04/14/20 1200 04/14/20 1018 04/14/20 1018 --   Temporal Monitor Sitting Right arm       Pain Score       04/14/20 2138       Med Not Given for Pain - for MAR use only        Wt Readings from Last 1 Encounters:   04/14/20 50 3 kg (110 lb 14 3 oz)     Additional Vital Signs:Vital Signs (last 2 days)     Date/Time Temp Pulse Resp BP MAP (mmHg) SpO2 O2 Device   04/15/20 1700 -- 73 -- -- -- 98 % Nasal cannula   04/15/20 15:16:49 100 2 °F (37 9 °C) 76 20 143/103Abnormal  116 97 % Nasal cannula   04/15/20 1514 100 2 °F (37 9 °C) -- -- -- -- -- --   04/15/20 1316 100 8 °F (38 2 °C)Abnormal  -- -- -- -- 97 % Nasal cannula   04/15/20 1123 101 5 °F (38 6 °C)Abnormal  -- -- 135/80 -- 98 % Nasal cannula   04/15/20 1035 -- 82 -- -- -- 97 % Nasal cannula   04/15/20 1027 -- 46Abnormal  -- -- -- 82 %Abnormal  None (Room air)   04/15/20 0930 -- 81 -- -- -- 98 % --   04/15/20 0841 103 4 °F (39 7 °C)Abnormal  85 19 147/60 89 92 % --   04/15/20 05:38:22 102 5 °F (39 2 °C)Abnormal  82 -- -- -- 95 % --   04/15/20 02:26:08 100 5 °F (38 1 °C) 79 -- -- -- 95 %      Pertinent Labs/Diagnostic Test Results:   4/14 CT facial bones:  Right maxillary canine and 2nd molar periapical abscesses   No evidence of adjacent subperiosteal abscess or cellulitis  2   Mild anterior subluxation of the right mandibular condyle may represent sequela of degenerative change  4/14 CT C/A/P: Few airspace opacities at the base of the left lower lobe and less prominently in the right lower lobe may represent mild aspiration or pneumonia  2   No evidence of bowel obstruction, colitis, diverticulitis or appendicitis  4/14 CXR: Limited by prominent patient rotation to the left and numerous skin folds projecting over the chest wall  Grossly clear lungs  No definite pneumothorax or pleural effusion  4/14 CT head: Curvilinear increased hyperdensity within the right orbital globe could relate to intraorbital hematoma or retinal detachment, correlate clinically and ophthalmology consult     Microangiopathy  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  Tiny low-density within the left basal ganglia age-indeterminate ischemia      Results from last 7 days   Lab Units 04/15/20  1033   SARS-COV-2  Positive*     Results from last 7 days   Lab Units 04/15/20  0544 04/14/20  1118   WBC Thousand/uL 6 84 5 13   HEMOGLOBIN g/dL 9 5* 10 4*   HEMATOCRIT % 31 4* 34 9   PLATELETS Thousands/uL 174 168   NEUTROS ABS Thousands/µL  --  4 01   BANDS PCT % 3  --          Results from last 7 days   Lab Units 04/15/20  0544 04/14/20  1118   SODIUM mmol/L 141 139   POTASSIUM mmol/L 4 4 5 3   CHLORIDE mmol/L 108 103   CO2 mmol/L 21 22   ANION GAP mmol/L 12 14*   BUN mg/dL 36* 56*   CREATININE mg/dL 1 30 1 72*   EGFR ml/min/1 73sq m 47 34   CALCIUM mg/dL 8 3 8 8   MAGNESIUM mg/dL  --   -- Results from last 7 days   Lab Units 04/16/20  0515 04/15/20  0544   AST U/L 29 21   ALT U/L 14 13   ALK PHOS U/L 64 57   TOTAL PROTEIN g/dL 7 7 7 3   ALBUMIN g/dL 2 6* 2 7*   TOTAL BILIRUBIN mg/dL 0 40 0 30     Results from last 7 days   Lab Units 04/15/20  2017   POC GLUCOSE mg/dl 183*     Results from last 7 days   Lab Units 04/15/20  0544 04/14/20  1118   GLUCOSE RANDOM mg/dL 210* 218*       Results from last 7 days   Lab Units 04/15/20  0544   HEMOGLOBIN A1C % 8 3*   EAG mg/dl 192     Results from last 7 days   Lab Units 04/14/20  2245   PROCALCITONIN ng/ml 0 46*     Results from last 7 days   Lab Units 04/14/20  1124   LACTIC ACID mmol/L 1 2         Results from last 7 days   Lab Units 04/14/20  2245   FERRITIN ng/mL 285     Results from last 7 days   Lab Units 04/14/20  2245   CRP mg/L 59 9*       Results from last 7 days   Lab Units 04/14/20  1505   CLARITY UA  Clear   COLOR UA  Yellow   SPEC GRAV UA  >=1 030   PH UA  5 5   GLUCOSE UA mg/dl Negative   KETONES UA mg/dl Negative   BLOOD UA  Trace-Intact*   PROTEIN UA mg/dl Trace*   NITRITE UA  Negative   BILIRUBIN UA  Negative   UROBILINOGEN UA E U /dl 0 2   LEUKOCYTES UA  Negative   WBC UA /hpf None Seen   RBC UA /hpf 0-1*   BACTERIA UA /hpf Occasional   EPITHELIAL CELLS WET PREP /hpf Occasional     Results from last 7 days   Lab Units 04/14/20  1124 04/14/20  1118   BLOOD CULTURE  No Growth at 48 hrs  No Growth at 48 hrs       Results from last 7 days   Lab Units 04/15/20  0544   TOTAL COUNTED  100     ED Treatment:   Medication Administration from 04/14/2020 1005 to 04/14/2020 1844       Date/Time Order Dose Route Action     04/14/2020 1127 sodium chloride 0 9 % bolus 1,000 mL 1,000 mL Intravenous New Bag     04/14/2020 1127 clindamycin (CLEOCIN) IVPB (premix) 600 mg 600 mg Intravenous New Bag     04/14/2020 1744 acetaminophen (TYLENOL) rectal suppository 325 mg 325 mg Rectal Given     04/14/2020 1749 sodium chloride 0 9 % bolus 500 mL 500 mL Intravenous New Bag        Past Medical History:   Diagnosis Date    Cancer ([facility] )     BREAST    Diabetes mellitus ([facility] )     Hypertension        Admitting Diagnosis: Poor nutrition [E63 9]  Altered mental status [R41 82]  Fever [R50 9]  JULEE (acute kidney injury) ([facility] ) [N17 9]  Pain, dental [K08 89]  Age/Sex: 69 y o  female  Admission Orders:  NGtube  CBC, CMP, CRP    Scheduled Medications:    Medications:  ampicillin-sulbactam 3 g Intravenous Q6H   ascorbic acid 1,000 mg Oral BID   azithromycin 250 mg Oral Q24H   azithromycin 500 mg Oral Once   enoxaparin 40 mg Subcutaneous Q24H SNOW   hydroxychloroquine 200 mg Oral Q12H   insulin lispro 1-5 Units Subcutaneous 4 times day   insulin lispro 1-5 Units Subcutaneous HS   zinc sulfate 220 mg Oral Daily     Continuous IV Infusions:    sodium chloride 75 mL/hr Intravenous Continuous     PRN Meds:    acetaminophen 650 mg Rectal Q4H PRN   Lidocaine Viscous HCl 15 mL Swish & Spit 4x Daily PRN       IP CONSULT TO CASE MANAGEMENT  IP CONSULT TO NEUROLOGY  IP CONSULT TO INFECTIOUS DISEASES    Network Utilization Review Department  [email or web address]  ATTENTION: Please call with any questions or concerns to [phone] and carefully listen to the prompts so that you are directed to the right person  All voicemails are confidential   [place] all requests for admission clinical reviews, approved or denied determinations and any other requests to dedicated fax number below belonging to the campus where the patient is receiving treatment   List of dedicated fax numbers for the Facilities:  FACILITY NAME UR FAX NUMBER   ADMISSION DENIALS (Administrative/Medical Necessity) [phone]   [address] [place] [address] (Maternity/NICU/Pediatrics) [phone]   Damion Jameson [phone]   [facility] [phone]   [place] [phone]   [facility] [phone]   Shakir Shelton Vivi [facility] [phone]   [address] [facility] [phone]   [facility] [phone] [address] [phone]

## 2020-04-17 NOTE — ASSESSMENT & PLAN NOTE
Metabolic encephalopathy, POA, likely due to dental abscesses and COVID 19 infection  Mental status slightly improving  CT head negative on admission  Neurology team on board also  Repeat CT head  Patient not eating currently, she holds her food in mouth  Follow speech recommendations  Today she agreed to eat    Will change diet to pureed diet

## 2020-04-17 NOTE — UTILIZATION REVIEW
Notification of Inpatient Admission/Inpatient Authorization Request   This is a Notification of Inpatient Admission for [address]  Be advised that this patient was admitted to our facility under Inpatient Status  Contact Mili Bruno at [phone] for additional admission information  [facility] DEPT DEDICATED Cindy Neal [phone]  Patient Name:   Shawn Kent   YOB: 1945       [address]   [address]:   [address]   Tax ID: [id number]  NPI: [id number] Attending Provider/NPI:  Phone:  Address: Sunil RubyDavi [[id number]]  [phone]  Same as JORDAN/Ulises [address] of Service Code: 24     Place of Service Name:  [address] [place] [address]   Start Date: 4/14/20 2127 Discharge Date & Time: No discharge date for patient encounter  Type of Admission: Inpatient Status Discharge Disposition   (if discharged): Final discharge disposition not confirmed   Patient Diagnoses: Poor nutrition [E63 9]  Altered mental status [R41 82]  Fever [R50 9]  JULEE (acute kidney injury) ([facility] ) [N17 9]  Pain, dental [K08 89]     Orders: Admission Orders (From admission, onward)     Ordered        04/14/20 2127  Inpatient Admission  Once         04/14/20 1714  Place in Observation  Once                    Assigned Utilization Review Contact: Mili Bruno  Utilization   Network Utilization Review Department  Phone: [phone]; Fax [phone]  Email: [email or web address]   ATTENTION PAYERS: Please call the assigned Utilization  directly with any questions or concerns ALL voicemails in the department are confidential  Send all requests for admission clinical reviews, approved or denied determinations and any other requests to dedicated fax number belonging to the campus where the patient is receiving treatment

## 2020-04-17 NOTE — PLAN OF CARE
Assessment:  Pt presented with MINIMAL to no engagement  She most frequently turned her head away from material placed to lips and/or wiped material from her lips  She occasionally allowed material to be placed on lips but did NOT retrieve material or swallow  She did take pills crushed in small amt of puree x1 for RN  Aware that NG tube was unable to be placed  Plan/Recommendations:  Continue discussion with family re: status and plan of care  Consider order for pureed food and nectar thick liquid and medications crushed AS DESIRED AND TOLERATED

## 2020-04-17 NOTE — PROGRESS NOTES
If SLP clears for diet, full liquids would indicate glucerna with meals, if thickened liquids indicated, magic cup with meals is RD recommendation

## 2020-04-17 NOTE — ASSESSMENT & PLAN NOTE
Sodium 150 today  Likely from dehydration  Initially she was started on D5W but blood sugar went up  Change IV fluid to half-normal saline  Recheck [facility] this afternoon

## 2020-04-17 NOTE — ASSESSMENT & PLAN NOTE
Infectious disease on board  Continue isolation  Continue hydroxychloroquine and azithromycin  Hydroxychloroquine changed to suspension  Monitor QTC  Follow inflammatory markers  Consider steroid if worsen  Currently patient not requiring any oxygen

## 2020-04-18 LAB
ALBUMIN SERPL BCP-MCNC: 2.1 G/DL (ref 3.5–5)
ALP SERPL-CCNC: 67 U/L (ref 46–116)
ALT SERPL W P-5'-P-CCNC: 13 U/L (ref 12–78)
ANION GAP SERPL CALCULATED.3IONS-SCNC: 17 MMOL/L (ref 4–13)
AST SERPL W P-5'-P-CCNC: 36 U/L (ref 5–45)
BILIRUB SERPL-MCNC: 0.4 MG/DL (ref 0.2–1)
BUN SERPL-MCNC: 27 MG/DL (ref 5–25)
CALCIUM SERPL-MCNC: 8.5 MG/DL (ref 8.3–10.1)
CHLORIDE SERPL-SCNC: 111 MMOL/L (ref 100–108)
CO2 SERPL-SCNC: 19 MMOL/L (ref 21–32)
CREAT SERPL-MCNC: 1.28 MG/DL (ref 0.6–1.3)
CRP SERPL QL: 212.7 MG/L
ERYTHROCYTE [DISTWIDTH] IN BLOOD BY AUTOMATED COUNT: 14.5 % (ref 11.6–15.1)
GFR SERPL CREATININE-BSD FRML MDRD: 48 ML/MIN/1.73SQ M
GLUCOSE SERPL-MCNC: 226 MG/DL (ref 65–140)
GLUCOSE SERPL-MCNC: 258 MG/DL (ref 65–140)
GLUCOSE SERPL-MCNC: 281 MG/DL (ref 65–140)
GLUCOSE SERPL-MCNC: 345 MG/DL (ref 65–140)
GLUCOSE SERPL-MCNC: 356 MG/DL (ref 65–140)
GLUCOSE SERPL-MCNC: 429 MG/DL (ref 65–140)
HCT VFR BLD AUTO: 30.5 % (ref 34.8–46.1)
HGB BLD-MCNC: 9.3 G/DL (ref 11.5–15.4)
MCH RBC QN AUTO: 28 PG (ref 26.8–34.3)
MCHC RBC AUTO-ENTMCNC: 30.5 G/DL (ref 31.4–37.4)
MCV RBC AUTO: 92 FL (ref 82–98)
NRBC BLD AUTO-RTO: 0 /100 WBCS
PLATELET # BLD AUTO: 203 THOUSANDS/UL (ref 149–390)
PMV BLD AUTO: 11.6 FL (ref 8.9–12.7)
POTASSIUM SERPL-SCNC: 4.3 MMOL/L (ref 3.5–5.3)
PROT SERPL-MCNC: 7.1 G/DL (ref 6.4–8.2)
RBC # BLD AUTO: 3.32 MILLION/UL (ref 3.81–5.12)
SODIUM SERPL-SCNC: 147 MMOL/L (ref 136–145)
WBC # BLD AUTO: 10.01 THOUSAND/UL (ref 4.31–10.16)

## 2020-04-18 PROCEDURE — 99232 SBSQ HOSP IP/OBS MODERATE 35: CPT | Performed by: INTERNAL MEDICINE

## 2020-04-18 PROCEDURE — 93005 ELECTROCARDIOGRAM TRACING: CPT

## 2020-04-18 PROCEDURE — 80053 COMPREHEN METABOLIC PANEL: CPT | Performed by: INTERNAL MEDICINE

## 2020-04-18 PROCEDURE — 82948 REAGENT STRIP/BLOOD GLUCOSE: CPT

## 2020-04-18 PROCEDURE — 85027 COMPLETE CBC AUTOMATED: CPT | Performed by: INTERNAL MEDICINE

## 2020-04-18 PROCEDURE — 86140 C-REACTIVE PROTEIN: CPT | Performed by: INTERNAL MEDICINE

## 2020-04-18 RX ORDER — HYDRALAZINE HYDROCHLORIDE 20 MG/ML
5 INJECTION INTRAMUSCULAR; INTRAVENOUS EVERY 6 HOURS PRN
Status: DISCONTINUED | OUTPATIENT
Start: 2020-04-18 | End: 2020-04-22 | Stop reason: HOSPADM

## 2020-04-18 RX ORDER — SODIUM CHLORIDE 450 MG/100ML
50 INJECTION, SOLUTION INTRAVENOUS CONTINUOUS
Status: DISCONTINUED | OUTPATIENT
Start: 2020-04-18 | End: 2020-04-19

## 2020-04-18 RX ORDER — ACETAMINOPHEN 325 MG/1
650 TABLET ORAL 3 TIMES DAILY
Status: DISCONTINUED | OUTPATIENT
Start: 2020-04-18 | End: 2020-04-22 | Stop reason: HOSPADM

## 2020-04-18 RX ORDER — INSULIN GLARGINE 100 [IU]/ML
20 INJECTION, SOLUTION SUBCUTANEOUS
Status: DISCONTINUED | OUTPATIENT
Start: 2020-04-18 | End: 2020-04-18

## 2020-04-18 RX ORDER — INSULIN GLARGINE 100 [IU]/ML
10 INJECTION, SOLUTION SUBCUTANEOUS
Status: DISCONTINUED | OUTPATIENT
Start: 2020-04-18 | End: 2020-04-19

## 2020-04-18 RX ORDER — TRAMADOL HYDROCHLORIDE 50 MG/1
50 TABLET ORAL EVERY 8 HOURS PRN
Status: DISCONTINUED | OUTPATIENT
Start: 2020-04-18 | End: 2020-04-22 | Stop reason: HOSPADM

## 2020-04-18 RX ADMIN — AMPICILLIN SODIUM AND SULBACTAM SODIUM 3 G: 10; 5 INJECTION, POWDER, FOR SOLUTION INTRAVENOUS at 16:52

## 2020-04-18 RX ADMIN — METHYLPREDNISOLONE SODIUM SUCCINATE 40 MG: 40 INJECTION, POWDER, FOR SOLUTION INTRAMUSCULAR; INTRAVENOUS at 09:20

## 2020-04-18 RX ADMIN — INSULIN LISPRO 2 UNITS: 100 INJECTION, SOLUTION INTRAVENOUS; SUBCUTANEOUS at 17:36

## 2020-04-18 RX ADMIN — OXYCODONE HYDROCHLORIDE AND ACETAMINOPHEN 1000 MG: 500 TABLET ORAL at 09:23

## 2020-04-18 RX ADMIN — AMPICILLIN SODIUM AND SULBACTAM SODIUM 3 G: 10; 5 INJECTION, POWDER, FOR SOLUTION INTRAVENOUS at 04:49

## 2020-04-18 RX ADMIN — ACETAMINOPHEN 650 MG: 650 SUPPOSITORY RECTAL at 15:32

## 2020-04-18 RX ADMIN — INSULIN LISPRO 3 UNITS: 100 INJECTION, SOLUTION INTRAVENOUS; SUBCUTANEOUS at 09:15

## 2020-04-18 RX ADMIN — LIDOCAINE HYDROCHLORIDE 15 ML: 20 SOLUTION ORAL; TOPICAL at 15:32

## 2020-04-18 RX ADMIN — HYDRALAZINE HYDROCHLORIDE 5 MG: 20 INJECTION INTRAMUSCULAR; INTRAVENOUS at 17:00

## 2020-04-18 RX ADMIN — Medication 200 MG: at 16:53

## 2020-04-18 RX ADMIN — AZITHROMYCIN 250 MG: 500 TABLET, FILM COATED ORAL at 09:23

## 2020-04-18 RX ADMIN — INSULIN GLARGINE 10 UNITS: 100 INJECTION, SOLUTION SUBCUTANEOUS at 21:57

## 2020-04-18 RX ADMIN — INSULIN LISPRO 2 UNITS: 100 INJECTION, SOLUTION INTRAVENOUS; SUBCUTANEOUS at 13:11

## 2020-04-18 RX ADMIN — AMPICILLIN SODIUM AND SULBACTAM SODIUM 3 G: 10; 5 INJECTION, POWDER, FOR SOLUTION INTRAVENOUS at 10:14

## 2020-04-18 RX ADMIN — INSULIN LISPRO 4 UNITS: 100 INJECTION, SOLUTION INTRAVENOUS; SUBCUTANEOUS at 06:51

## 2020-04-18 RX ADMIN — ZINC SULFATE 220 MG (50 MG) CAPSULE 220 MG: CAPSULE at 09:23

## 2020-04-18 RX ADMIN — Medication 200 MG: at 04:52

## 2020-04-18 RX ADMIN — ENOXAPARIN SODIUM 40 MG: 40 INJECTION SUBCUTANEOUS at 09:23

## 2020-04-18 RX ADMIN — INSULIN LISPRO 2 UNITS: 100 INJECTION, SOLUTION INTRAVENOUS; SUBCUTANEOUS at 21:57

## 2020-04-18 RX ADMIN — ACETAMINOPHEN 650 MG: 650 SUPPOSITORY RECTAL at 21:54

## 2020-04-18 RX ADMIN — OXYCODONE HYDROCHLORIDE AND ACETAMINOPHEN 1000 MG: 500 TABLET ORAL at 17:02

## 2020-04-18 RX ADMIN — SODIUM CHLORIDE 50 ML/HR: 0.45 INJECTION, SOLUTION INTRAVENOUS at 09:17

## 2020-04-18 RX ADMIN — AMPICILLIN SODIUM AND SULBACTAM SODIUM 3 G: 10; 5 INJECTION, POWDER, FOR SOLUTION INTRAVENOUS at 22:00

## 2020-04-18 NOTE — ASSESSMENT & PLAN NOTE
Lab Results   Component Value Date    HGBA1C 8 3 (H) 04/15/2020       Recent Labs     04/17/20  1040 04/17/20  1641 04/17/20  2137 04/18/20  0628   POCGLU 372* 115 326* 429*       Blood Sugar Average: Last 72 hrs:  Hemoglobin A1c 8 3  Present on admission with history of diabetes  Hold metformin and sliding scale coverage  Blood sugar high today because of steroid  With change IV fluid to half-normal saline  Also start on Lantus 10 units q h s   Continue sliding scale

## 2020-04-18 NOTE — PLAN OF CARE
Problem: Prexisting or High Potential for Compromised Skin Integrity  Goal: Skin integrity is maintained or improved  Description  INTERVENTIONS:  - Identify patients at risk for skin breakdown  - Assess and monitor skin integrity  - Assess and monitor nutrition and hydration status  - Monitor labs   - Assess for incontinence   - Turn and reposition patient  - Assist with mobility/ambulation  - Relieve pressure over bony prominences  - Avoid friction and shearing  - Provide appropriate hygiene as needed including keeping skin clean and dry  - Evaluate need for skin moisturizer/barrier cream  - Collaborate with interdisciplinary team   - Patient/family teaching  - Consider wound care consult   Outcome: Progressing     Problem: PAIN - ADULT  Goal: Verbalizes/displays adequate comfort level or baseline comfort level  Description  Interventions:  - Encourage patient to monitor pain and request assistance  - Assess pain using appropriate pain scale  - Administer analgesics based on type and severity of pain and evaluate response  - Implement non-pharmacological measures as appropriate and evaluate response  - Consider cultural and social influences on pain and pain management  - Notify physician/advanced practitioner if interventions unsuccessful or patient reports new pain  Outcome: Progressing     Problem: INFECTION - ADULT  Goal: Absence or prevention of progression during hospitalization  Description  INTERVENTIONS:  - Assess and monitor for signs and symptoms of infection  - Monitor lab/diagnostic results  - Monitor all insertion sites, i e  indwelling lines, tubes, and drains  - Monitor endotracheal if appropriate and nasal secretions for changes in amount and color  - [place] appropriate cooling/warming therapies per order  - Administer medications as ordered  - Instruct and encourage patient and family to use good hand hygiene technique  - Identify and instruct in appropriate isolation precautions for identified infection/condition  Outcome: Progressing  Goal: Absence of fever/infection during neutropenic period  Description  INTERVENTIONS:  - Monitor WBC    Outcome: Progressing     Problem: SAFETY ADULT  Goal: Patient will remain free of falls  Description  INTERVENTIONS:  - Assess patient frequently for physical needs  -  Identify cognitive and physical deficits and behaviors that affect risk of falls    -  [place] fall precautions as indicated by assessment   - Educate patient/family on patient safety including physical limitations  - Instruct patient to call for assistance with activity based on assessment  - Modify environment to reduce risk of injury  - Consider OT/PT consult to assist with strengthening/mobility  Outcome: Progressing  Goal: Maintain or return to baseline ADL function  Description  INTERVENTIONS:  -  Assess patient's ability to carry out ADLs; assess patient's baseline for ADL function and identify physical deficits which impact ability to perform ADLs (bathing, care of mouth/teeth, toileting, grooming, dressing, etc )  - Assess/evaluate cause of self-care deficits   - Assess range of motion  - Assess patient's mobility; develop plan if impaired  - Assess patient's need for assistive devices and provide as appropriate  - Encourage maximum independence but intervene and supervise when necessary  - Involve family in performance of ADLs  - Assess for home care needs following discharge   - Consider OT consult to assist with ADL evaluation and planning for discharge  - Provide patient education as appropriate  Outcome: Progressing  Goal: Maintain or return mobility status to optimal level  Description  INTERVENTIONS:  - Assess patient's baseline mobility status (ambulation, transfers, stairs, etc )    - Identify cognitive and physical deficits and behaviors that affect mobility  - Identify mobility aids required to assist with transfers and/or ambulation (gait belt, sit-to-stand, lift, walker, cane, etc )  - [place] fall precautions as indicated by assessment  - Record patient progress and toleration of activity level on Mobility SBAR; progress patient to next Phase/Stage  - Instruct patient to call for assistance with activity based on assessment  - Consider rehabilitation consult to assist with strengthening/weightbearing, etc   Outcome: Progressing     Problem: DISCHARGE PLANNING  Goal: Discharge to home or other facility with appropriate resources  Description  INTERVENTIONS:  - Identify barriers to discharge w/patient and caregiver  - Arrange for needed discharge resources and transportation as appropriate  - Identify discharge learning needs (meds, wound care, etc )  - Arrange for interpretive services to assist at discharge as needed  - Refer to Case Management Department for coordinating discharge planning if the patient needs post-hospital services based on physician/advanced practitioner order or complex needs related to functional status, cognitive ability, or social support system  Outcome: Progressing     Problem: Knowledge Deficit  Goal: Patient/family/caregiver demonstrates understanding of disease process, treatment plan, medications, and discharge instructions  Description  Complete learning assessment and assess knowledge base  Interventions:  - Provide teaching at level of understanding  - Provide teaching via preferred learning methods  Outcome: Progressing     Problem: Nutrition/Hydration-ADULT  Goal: Nutrient/Hydration intake appropriate for improving, restoring or maintaining nutritional needs  Description  Monitor and assess patient's nutrition/hydration status for malnutrition  Collaborate with interdisciplinary team and initiate plan and interventions as ordered  Monitor patient's weight and dietary intake as ordered or per policy  Utilize nutrition screening tool and intervene as necessary   Determine patient's food preferences and provide high-protein, high-caloric foods as appropriate  INTERVENTIONS:  - Monitor oral intake, urinary output, labs, and treatment plans  - Assess nutrition and hydration status and recommend course of action  - Evaluate amount of meals eaten  - Assist patient with eating if necessary   - Allow adequate time for meals  - Recommend/ encourage appropriate diets, oral nutritional supplements, and vitamin/mineral supplements  - Order, calculate, and assess calorie counts as needed  - Recommend, monitor, and adjust tube feedings based on assessed needs  - Assess need for intravenous fluids  - Provide nutrition/hydration education as appropriate  - Include patient/family/caregiver in decisions related to nutrition   Outcome: Progressing     Problem: Potential for Falls  Goal: Patient will remain free of falls  Description  INTERVENTIONS:  - Assess patient frequently for physical needs  -  Identify cognitive and physical deficits and behaviors that affect risk of falls    -  [place] fall precautions as indicated by assessment   - Educate patient/family on patient safety including physical limitations  - Instruct patient to call for assistance with activity based on assessment  - Modify environment to reduce risk of injury  - Consider OT/PT consult to assist with strengthening/mobility  Outcome: Progressing

## 2020-04-18 NOTE — QUICK NOTE
Reviewed patient's non-contrast CT head which is stable  No evolving CVA noted on imaging  "No significant interval change since CT head on 4/14/2020  No mass effect, acute intracranial hemorrhage or CT evidence for new large acute vascular distribution infarct  Stable chronic right PCA distribution infarct  Age-related involutional and scattered chronic microvascular ischemic change with involvement of the gangliocapsular regions  Stable right-sided retinal detachment "    No further neurologic workup at this time  Please call with any questions

## 2020-04-18 NOTE — UTILIZATION REVIEW
Continued Stay Review    Date: 4/18                         Current Patient Class: Inpatient Current Level of Care: Med Surg    HPI:74 y o  female initially admitted on 4/14    Assessment/Plan:   Hypernatremia - Sodium 150 yesterday  Likely from dehydration  Started on D5W  Blood sugar 147 today  Her blood sugars running high  Will switch to 0 5 NS IVF  COVID-19 virus detected - Continue isolation  Continue hydroxychloroquine and azithromycin  Hydroxychloroquine changed to suspension  Monitor QTC  Follow inflammatory markers  CRP increasing, 212 today  Per ID start IV Steroids  Monitor closely  On room air  Mental status improving  Changed pureed diet  Neurology following  JULEE - Creat improving  1 28 today       Pertinent Labs/Diagnostic Results:   Results from last 7 days   Lab Units 04/15/20  1033   SARS-COV-2  Positive*     Results from last 7 days   Lab Units 04/18/20  0445 04/17/20  0548 04/16/20  0515 04/15/20  0544 04/14/20  1118   WBC Thousand/uL 10 01 8 66 9 43 6 84 5 13   HEMOGLOBIN g/dL 9 3* 8 9* 9 8* 9 5* 10 4*   HEMATOCRIT % 30 5* 29 6* 33 0* 31 4* 34 9   PLATELETS Thousands/uL 203 199 198 174 168   NEUTROS ABS Thousands/µL  --   --   --   --  4 01   BANDS PCT %  --   --   --  3  --          Results from last 7 days   Lab Units 04/18/20  0445 04/17/20  1424 04/17/20  0548 04/16/20  0515 04/15/20  0544   SODIUM mmol/L 147* 151* 150* 146* 141   POTASSIUM mmol/L 4 3 3 4* 3 8 4 1 4 4   CHLORIDE mmol/L 111* 115* 113* 109* 108   CO2 mmol/L 19* 24 19* 21 21   ANION GAP mmol/L 17* 12 18* 16* 12   BUN mg/dL 27* 19 21 22 36*   CREATININE mg/dL 1 28 1 30 1 17 1 30 1 30   EGFR ml/min/1 73sq m 48 47 53 47 47   CALCIUM mg/dL 8 5 8 7 8 2* 8 5 8 3   MAGNESIUM mg/dL  --   --   --  2 2  --      Results from last 7 days   Lab Units 04/18/20  0445 04/17/20  0548 04/16/20  0515 04/15/20  0544   AST U/L 36 30 29 21   ALT U/L 13 12 14 13   ALK PHOS U/L 67 62 64 57   TOTAL PROTEIN g/dL 7 1 6 9 7 7 7 3   ALBUMIN g/dL 2  1* 2 3* 2 6* 2 7*   TOTAL BILIRUBIN mg/dL 0 40 0 40 0 40 0 30     Results from last 7 days   Lab Units 04/18/20  1240 04/18/20  0914 04/18/20  0628 04/17/20  2137 04/17/20  1641 04/17/20  1040 04/17/20  0551 04/16/20  2039 04/16/20  1551 04/16/20  1150 04/16/20  0639 04/15/20  2017   POC GLUCOSE mg/dl 258* 356* 429* 326* 115 372* 267* 189* 193* 209* 206* 183*     Results from last 7 days   Lab Units 04/18/20  0445 04/17/20  1424 04/17/20  0548 04/16/20  0515 04/15/20  0544 04/14/20  1118   GLUCOSE RANDOM mg/dL 345* 142* 217* 187* 210* 218*         Results from last 7 days   Lab Units 04/15/20  0544   HEMOGLOBIN A1C % 8 3*   EAG mg/dl 192       Results from last 7 days   Lab Units 04/16/20  0515 04/14/20  2245   PROCALCITONIN ng/ml 2 67* 0 46*     Results from last 7 days   Lab Units 04/14/20  1124   LACTIC ACID mmol/L 1 2     Results from last 7 days   Lab Units 04/17/20  0548 04/16/20  0515 04/14/20  2245   FERRITIN ng/mL 504* 397* 285             Results from last 7 days   Lab Units 04/18/20  0445 04/17/20  0548 04/16/20  0515 04/14/20  2245   CRP mg/L 212 7* 184 0* 170 7* 59 9*         Results from last 7 days   Lab Units 04/14/20  1505   CLARITY UA  Clear   COLOR UA  Yellow   SPEC GRAV UA  >=1 030   PH UA  5 5   GLUCOSE UA mg/dl Negative   KETONES UA mg/dl Negative   BLOOD UA  Trace-Intact*   PROTEIN UA mg/dl Trace*   NITRITE UA  Negative   BILIRUBIN UA  Negative   UROBILINOGEN UA E U /dl 0 2   LEUKOCYTES UA  Negative   WBC UA /hpf None Seen   RBC UA /hpf 0-1*   BACTERIA UA /hpf Occasional   EPITHELIAL CELLS WET PREP /hpf Occasional     Results from last 7 days   Lab Units 04/14/20  1124 04/14/20  1118   BLOOD CULTURE  No Growth at 72 hrs  No Growth at 72 hrs       Results from last 7 days   Lab Units 04/15/20  0544   TOTAL COUNTED  100       Vital Signs:   04/18/20 06:30:26  97 °F (36 1 °C)Abnormal   57  18  144/77  99  93 %  None (Room air)  Lying   04/18/20 0502  --  --  --  --  --  93 %  None (Room air)  -- 04/18/20 04:32:15  96 7 °F (35 9 °C)Abnormal   59  24Abnormal   141/76  98  91 %  None (Room air)  Lying   04/18/20 0130  --  --  --  --  --  94 %  None (Room air)  --   04/17/20 2300  97 3 °F (36 3 °C)Abnormal   63  20  140/78  --  93 %  None (Room air)  --   04/17/20 2130  --  --  --  --  --  92 %  None (Room air)  --   04/17/20 1930  100 4 °F (38 °C)  75  26Abnormal   138/88  --  96 %  None (Room air)  --   04/17/20 15:56:44  102 3 °F (39 1 °C)Abnormal   80  28Abnormal   137/92  107  96 %  None (Room air)  --   04/17/20 14:10:03  103 3 °F (39 6 °C)Abnormal   --  --  --  --  --  --       Medications:   Scheduled Medications:  Medications:  acetaminophen 650 mg Oral TID   ampicillin-sulbactam 3 g Intravenous Q6H   ascorbic acid 1,000 mg Oral BID   azithromycin 250 mg Oral Q24H   azithromycin 500 mg Oral Once   enoxaparin 40 mg Subcutaneous Q24H [address]   hydroxychloroquine 200 mg Oral Q12H   insulin glargine 10 Units Subcutaneous HS   insulin lispro 1-5 Units Subcutaneous 4 times day   insulin lispro 1-5 Units Subcutaneous HS   methylPREDNISolone sodium succinate 40 mg Intravenous Daily   zinc sulfate 220 mg Oral Daily     Continuous IV Infusions:  sodium chloride 50 mL/hr Intravenous Continuous     PRN Meds:  acetaminophen 650 mg Rectal Q4H PRN 4/18 x1   hydrALAZINE 5 mg Intravenous Q6H PRN   Lidocaine Viscous HCl 15 mL Swish & Spit 4x Daily PRN   traMADol 50 mg Oral Q8H PRN     Discharge Plan: TBD    Network Utilization Review Department  [email or web address]  ATTENTION: Please call with any questions or concerns to [phone] and carefully listen to the prompts so that you are directed to the right person  All voicemails are confidential   Barry Poe all requests for admission clinical reviews, approved or denied determinations and any other requests to dedicated fax number below belonging to the campus where the patient is receiving treatment   List of dedicated fax numbers for the Facilities:  FACILITY NAME UR FAX NUMBER   ADMISSION DENIALS (Administrative/Medical Necessity) [phone]   PARENT CHILD HEALTH (Maternity/NICU/Pediatrics) [phone]   [facility] [place] [phone]     [address] [phone]   [address] [phone]   [facility] [address] [facility] [phone]   [address] [facility] [phone]   [address] [facility] [address]  [address] [facility] [address] [facility] [phone]

## 2020-04-18 NOTE — ASSESSMENT & PLAN NOTE
Sodium 150 yesterday  Likely from dehydration  Started on D5W  Blood sugar 147 today  Her blood sugars running high  Will switch to half-normal saline

## 2020-04-18 NOTE — PROGRESS NOTES
Progress Note - Francisco Lynn 1945, 69 y o  female MRN: [id number]    Unit/Bed#: -01 Encounter: [id number]    Primary Care Provider: No primary care provider on file  Date and time admitted to hospital: 4/14/2020 10:12 AM        Hypernatremia  Assessment & Plan  Sodium 150 yesterday  Likely from dehydration  Started on D5W  Blood sugar 147 today  Her blood sugars running high  Will switch to half-normal saline  COVID-19 virus detected  Assessment & Plan  Infectious disease on board  Continue isolation  Continue hydroxychloroquine and azithromycin  Hydroxychloroquine changed to suspension  Monitor QTC  Follow inflammatory markers  CRP increasing, 212 today  Discussed with infectious disease  Recommended to start on IV Solu-Medrol  Monitor closely  Patient still breathing on room air  AMS (altered mental status)  Assessment & Plan  Metabolic encephalopathy, POA, likely due to dental abscesses and COVID 19 infection  Mental status improving  CT head negative on admission  Neurology team on board also  Repeat CT head did not show any acute changes also  Yesterday her diet changed to pureed diet  Abnormal finding on CT scan  Assessment & Plan  CT head report noted for incidentally noted 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 per family  Daughter Carlie Boyce states that [facility] and having right eye injury and patient was seen by ophthalmologist but she never followed up for surgery per daughter  Currently she is almost plan right eye per daughter  Outpatient follow-up  JULEE (acute kidney injury) ([facility] [address] )  Assessment & Plan  Suspected acute kidney injury with creatinine 1 72 present admission  Patient lives in [place] and no previous labs or records available to review  Creatinine improving    1 28 today    HTN (hypertension)  Assessment & Plan  Present admission with history of hypertension  Home medication includes Diovan  Hold Diovan in the settings of suspected acute kidney injury  Monitor vitals,  IV Lopressor p r n  DM (diabetes mellitus) [facility])  Assessment & Plan  Lab Results   Component Value Date    HGBA1C 8 3 (H) 04/15/2020       Recent Labs     04/17/20  1040 04/17/20  1641 04/17/20  2137 04/18/20  0628   POCGLU 372* 115 326* 429*       Blood Sugar Average: Last 72 hrs:  Hemoglobin A1c 8 3  Present on admission with history of diabetes  Hold metformin and sliding scale coverage  Blood sugar high today because of steroid  With change IV fluid to half-normal saline  Also start on Lantus 10 units q h s   Continue sliding scale  * Fever  Assessment & Plan  81-year-old female past medical history of diabetes, hypertension, lives in [place] but has been living with daughter for about a month in [place]  Patient is currently poor historian, poor insight  History obtained from daughter over the phone  Daughter stated that patient has not been eating adequately with poor p o  Intake lately and has been complaining of having tooth pain for quite some time  On presentation she is noted to have high-grade fevers  Likely secondary to COVID- 19  UA negative  Chest x-ray without any acute finding  CT head negative for acute finding, 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  Patient also has tooth infection  Evaluated by Infectious Disease and oral surgeon  Antibiotics switched to IV Unasyn per ID  Blood cultures no growth in 72 hours  Continue supportive care  F/u With ID and OMFS recommendations  VTE Pharmacologic Prophylaxis:   Pharmacologic: Enoxaparin (Lovenox)  Mechanical VTE Prophylaxis in Place: Yes    Patient Centered Rounds: I have performed bedside rounds with nursing staff today  Discussions with Specialists or Other Care Team Provider:      Education and Discussions with Family / Patient: cheryl Humphreys    Time Spent for Care:   More than 50% of total time spent on counseling and coordination of care as described above  Current Length of Stay: 4 day(s)    Current Patient Status: Inpatient   Certification Statement: The patient will continue to require additional inpatient hospital stay due to covid    Discharge Plan:  Not clear yet    Code Status: Level 1 - Full Code      Subjective:   I have seen the patient this morning  Patient lying on bed  Still has pain in mouth  No shortness of breath  Having fever, T-max 103 3°    Objective:     Vitals:   Temp (24hrs), Av 5 °F (37 5 °C), Min:96 7 °F (35 9 °C), Max:103 3 °F (39 6 °C)    Temp:  [96 7 °F (35 9 °C)-103 3 °F (39 6 °C)] 97 °F (36 1 °C)  HR:  [57-80] 57  Resp:  [18-28] 18  BP: (137-144)/(76-92) 144/77  SpO2:  [91 %-96 %] 93 %  Body mass index is 20 28 kg/m²  Input and Output Summary (last 24 hours): Intake/Output Summary (Last 24 hours) at 2020 0805  Last data filed at 2020 1936  Gross per 24 hour   Intake 240 ml   Output --   Net 240 ml       Physical Exam:     Physical Exam  Limited due to pandemic COVID-19 infection  General- Awake, alert, looks comfortable  Respiratory system- breathing on room air, not using any accessory muscles  Psych- No acute psychosis  CNS- moving all extremities    Additional Data:     Labs:    Results from last 7 days   Lab Units 20  0445  04/15/20  0544 20  1118   WBC Thousand/uL 10 01   < > 6 84 5 13   HEMOGLOBIN g/dL 9 3*   < > 9 5* 10 4*   HEMATOCRIT % 30 5*   < > 31 4* 34 9   PLATELETS Thousands/uL 203   < > 174 168   BANDS PCT %  --   --  3  --    NEUTROS PCT %  --   --   --  79*   LYMPHS PCT %  --   --   --  15   LYMPHO PCT %  --   --  19  --    MONOS PCT %  --   --   --  6   MONO PCT %  --   --  4  --    EOS PCT %  --   --  0 0    < > = values in this interval not displayed       Results from last 7 days   Lab Units 20  0445   SODIUM mmol/L 147*   POTASSIUM mmol/L 4 3   CHLORIDE mmol/L 111*   CO2 mmol/L 19* BUN mg/dL 27*   CREATININE mg/dL 1 28   ANION GAP mmol/L 17*   CALCIUM mg/dL 8 5   ALBUMIN g/dL 2 1*   TOTAL BILIRUBIN mg/dL 0 40   ALK PHOS U/L 67   ALT U/L 13   AST U/L 36   GLUCOSE RANDOM mg/dL 345*         Results from last 7 days   Lab Units 04/18/20  0628 04/17/20  2137 04/17/20  1641 04/17/20  1040 04/17/20  0551 04/16/20  2039 04/16/20  1551 04/16/20  1150 04/16/20  0639 04/15/20  2017   POC GLUCOSE mg/dl 429* 326* 115 372* 267* 189* 193* 209* 206* 183*     Results from last 7 days   Lab Units 04/15/20  0544   HEMOGLOBIN A1C % 8 3*     Results from last 7 days   Lab Units 04/16/20  0515 04/14/20  2245 04/14/20  1124   LACTIC ACID mmol/L  --   --  1 2   PROCALCITONIN ng/ml 2 67* 0 46*  --            * I Have Reviewed All Lab Data Listed Above  * Additional Pertinent Lab Tests Reviewed: All Labs Within Last 24 Hours Reviewed    Imaging:    Imaging Reports Reviewed Today Include:   CT head  IMPRESSION:     No significant interval change since 4/14/2020  No mass effect, acute intracranial hemorrhage or CT evidence for new large acute vascular distribution infarct      Stable chronic right PCA distribution infarct  Age-related involutional and scattered chronic microvascular ischemic change with involvement of the gangliocapsular regions      Stable right-sided retinal detachment  Imaging Personally Reviewed by Myself Includes:      Recent Cultures (last 7 days):     Results from last 7 days   Lab Units 04/14/20  1124 04/14/20  1118   BLOOD CULTURE  No Growth at 72 hrs  No Growth at 72 hrs         Last 24 Hours Medication List:     Current Facility-Administered Medications:  acetaminophen 650 mg Rectal Q4H PRN Juliocesar Asencio MD    ampicillin-sulbactam 3 g Intravenous Q6H Juliocesar Asencio MD Last Rate: 3 g (04/18/20 0449)   ascorbic acid 1,000 mg Oral BID Murphy DUNHAM MD    azithromycin 250 mg Oral Q24H Murphy DUNHAM MD    azithromycin 500 mg Oral Once Murphy DUNHAM MD    enoxaparin 40 mg Subcutaneous Q24H [address] Murphy DUNHAM MD    hydroxychloroquine 200 mg Oral Q12H Nic Pelletier MD    insulin glargine 10 Units Subcutaneous HS Nic Pelletier MD    insulin lispro 1-5 Units Subcutaneous 4 times day Ben Galeas MD    insulin lispro 1-5 Units Subcutaneous HS Ben Galeas MD    Lidocaine Viscous HCl 15 mL Swish & Spit 4x Daily PRN Nic Pelletier MD    methylPREDNISolone sodium succinate 40 mg Intravenous Daily Nic Pelletier MD    sodium chloride 50 mL/hr Intravenous Continuous Nic Pelletier MD    zinc sulfate 220 mg Oral Daily Peter Cleveland MD         Today, Patient Was Seen By: Yemi Cortes MD    ** Please Note: Dictation voice to text software may have been used in the creation of this document   **

## 2020-04-18 NOTE — ASSESSMENT & PLAN NOTE
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

## 2020-04-18 NOTE — PROGRESS NOTES
Report given to [facility], [facility] , she is aware of blood sugar results

## 2020-04-18 NOTE — ASSESSMENT & PLAN NOTE
Suspected acute kidney injury with creatinine 1 72 present admission  Patient lives in [place] and no previous labs or records available to review  Creatinine improving    1 28 today

## 2020-04-18 NOTE — ASSESSMENT & PLAN NOTE
Metabolic encephalopathy, POA, likely due to dental abscesses and COVID 19 infection  Mental status improving  CT head negative on admission  Neurology team on board also  Repeat CT head did not show any acute changes also  Yesterday her diet changed to pureed diet

## 2020-04-18 NOTE — ASSESSMENT & PLAN NOTE
Infectious disease on board  Continue isolation  Continue hydroxychloroquine and azithromycin  Hydroxychloroquine changed to suspension  Monitor QTC  Follow inflammatory markers  CRP increasing, 212 today  Discussed with infectious disease  Recommended to start on IV Solu-Medrol  Monitor closely  Patient still breathing on room air

## 2020-04-19 LAB
ALBUMIN SERPL BCP-MCNC: 2.1 G/DL (ref 3.5–5)
ALP SERPL-CCNC: 70 U/L (ref 46–116)
ALT SERPL W P-5'-P-CCNC: 12 U/L (ref 12–78)
ANION GAP SERPL CALCULATED.3IONS-SCNC: 12 MMOL/L (ref 4–13)
AST SERPL W P-5'-P-CCNC: 27 U/L (ref 5–45)
ATRIAL RATE: 60 BPM
BACTERIA BLD CULT: NORMAL
BACTERIA BLD CULT: NORMAL
BASOPHILS # BLD MANUAL: 0 THOUSAND/UL (ref 0–0.1)
BASOPHILS NFR MAR MANUAL: 0 % (ref 0–1)
BILIRUB SERPL-MCNC: 0.3 MG/DL (ref 0.2–1)
BUN SERPL-MCNC: 25 MG/DL (ref 5–25)
CALCIUM SERPL-MCNC: 8.8 MG/DL (ref 8.3–10.1)
CHLORIDE SERPL-SCNC: 113 MMOL/L (ref 100–108)
CO2 SERPL-SCNC: 24 MMOL/L (ref 21–32)
CREAT SERPL-MCNC: 1.18 MG/DL (ref 0.6–1.3)
CRP SERPL QL: 161.1 MG/L
EOSINOPHIL # BLD MANUAL: 0 THOUSAND/UL (ref 0–0.4)
EOSINOPHIL NFR BLD MANUAL: 0 % (ref 0–6)
ERYTHROCYTE [DISTWIDTH] IN BLOOD BY AUTOMATED COUNT: 14.2 % (ref 11.6–15.1)
GFR SERPL CREATININE-BSD FRML MDRD: 53 ML/MIN/1.73SQ M
GLUCOSE SERPL-MCNC: 202 MG/DL (ref 65–140)
GLUCOSE SERPL-MCNC: 203 MG/DL (ref 65–140)
GLUCOSE SERPL-MCNC: 206 MG/DL (ref 65–140)
GLUCOSE SERPL-MCNC: 226 MG/DL (ref 65–140)
GLUCOSE SERPL-MCNC: 242 MG/DL (ref 65–140)
GLUCOSE SERPL-MCNC: 274 MG/DL (ref 65–140)
HCT VFR BLD AUTO: 31.9 % (ref 34.8–46.1)
HGB BLD-MCNC: 10 G/DL (ref 11.5–15.4)
LYMPHOCYTES # BLD AUTO: 1.24 THOUSAND/UL (ref 0.6–4.47)
LYMPHOCYTES # BLD AUTO: 9 % (ref 14–44)
MCH RBC QN AUTO: 27.9 PG (ref 26.8–34.3)
MCHC RBC AUTO-ENTMCNC: 31.3 G/DL (ref 31.4–37.4)
MCV RBC AUTO: 89 FL (ref 82–98)
METAMYELOCYTES NFR BLD MANUAL: 1 % (ref 0–1)
MONOCYTES # BLD AUTO: 0.69 THOUSAND/UL (ref 0–1.22)
MONOCYTES NFR BLD: 5 % (ref 4–12)
MYELOCYTES NFR BLD MANUAL: 3 % (ref 0–1)
NEUTROPHILS # BLD MANUAL: 11.3 THOUSAND/UL (ref 1.85–7.62)
NEUTS BAND NFR BLD MANUAL: 12 % (ref 0–8)
NEUTS SEG NFR BLD AUTO: 70 % (ref 43–75)
NRBC BLD AUTO-RTO: 1 /100 WBCS
P AXIS: 83 DEGREES
PLATELET # BLD AUTO: 239 THOUSANDS/UL (ref 149–390)
PLATELET BLD QL SMEAR: ADEQUATE
PMV BLD AUTO: 11.5 FL (ref 8.9–12.7)
POTASSIUM SERPL-SCNC: 3.1 MMOL/L (ref 3.5–5.3)
PR INTERVAL: 144 MS
PROT SERPL-MCNC: 7.1 G/DL (ref 6.4–8.2)
QRS AXIS: 58 DEGREES
QRSD INTERVAL: 110 MS
QT INTERVAL: 462 MS
QTC INTERVAL: 462 MS
RBC # BLD AUTO: 3.59 MILLION/UL (ref 3.81–5.12)
RBC MORPH BLD: NORMAL
SODIUM SERPL-SCNC: 149 MMOL/L (ref 136–145)
T WAVE AXIS: 59 DEGREES
TOTAL CELLS COUNTED SPEC: 100
VENTRICULAR RATE: 60 BPM
WBC # BLD AUTO: 13.78 THOUSAND/UL (ref 4.31–10.16)

## 2020-04-19 PROCEDURE — 93010 ELECTROCARDIOGRAM REPORT: CPT | Performed by: INTERNAL MEDICINE

## 2020-04-19 PROCEDURE — 85007 BL SMEAR W/DIFF WBC COUNT: CPT | Performed by: INTERNAL MEDICINE

## 2020-04-19 PROCEDURE — 85027 COMPLETE CBC AUTOMATED: CPT | Performed by: INTERNAL MEDICINE

## 2020-04-19 PROCEDURE — 80053 COMPREHEN METABOLIC PANEL: CPT | Performed by: INTERNAL MEDICINE

## 2020-04-19 PROCEDURE — 99232 SBSQ HOSP IP/OBS MODERATE 35: CPT | Performed by: INTERNAL MEDICINE

## 2020-04-19 PROCEDURE — 86140 C-REACTIVE PROTEIN: CPT | Performed by: INTERNAL MEDICINE

## 2020-04-19 PROCEDURE — 82948 REAGENT STRIP/BLOOD GLUCOSE: CPT

## 2020-04-19 RX ORDER — INSULIN GLARGINE 100 [IU]/ML
15 INJECTION, SOLUTION SUBCUTANEOUS
Status: DISCONTINUED | OUTPATIENT
Start: 2020-04-19 | End: 2020-04-21

## 2020-04-19 RX ORDER — DEXTROSE AND POTASSIUM CHLORIDE 5; .15 G/100ML; G/100ML
50 SOLUTION INTRAVENOUS CONTINUOUS
Status: DISCONTINUED | OUTPATIENT
Start: 2020-04-19 | End: 2020-04-22

## 2020-04-19 RX ADMIN — AMPICILLIN SODIUM AND SULBACTAM SODIUM 3 G: 10; 5 INJECTION, POWDER, FOR SOLUTION INTRAVENOUS at 17:24

## 2020-04-19 RX ADMIN — INSULIN GLARGINE 15 UNITS: 100 INJECTION, SOLUTION SUBCUTANEOUS at 22:26

## 2020-04-19 RX ADMIN — METHYLPREDNISOLONE SODIUM SUCCINATE 40 MG: 40 INJECTION, POWDER, FOR SOLUTION INTRAMUSCULAR; INTRAVENOUS at 09:07

## 2020-04-19 RX ADMIN — ACETAMINOPHEN 650 MG: 325 TABLET ORAL at 17:00

## 2020-04-19 RX ADMIN — AMPICILLIN SODIUM AND SULBACTAM SODIUM 3 G: 10; 5 INJECTION, POWDER, FOR SOLUTION INTRAVENOUS at 04:01

## 2020-04-19 RX ADMIN — DEXTROSE AND POTASSIUM CHLORIDE 75 ML/HR: 5; .15 SOLUTION INTRAVENOUS at 22:28

## 2020-04-19 RX ADMIN — AZITHROMYCIN 250 MG: 500 TABLET, FILM COATED ORAL at 09:08

## 2020-04-19 RX ADMIN — SODIUM CHLORIDE 50 ML/HR: 0.45 INJECTION, SOLUTION INTRAVENOUS at 04:01

## 2020-04-19 RX ADMIN — OXYCODONE HYDROCHLORIDE AND ACETAMINOPHEN 1000 MG: 500 TABLET ORAL at 09:08

## 2020-04-19 RX ADMIN — ENOXAPARIN SODIUM 40 MG: 40 INJECTION SUBCUTANEOUS at 09:09

## 2020-04-19 RX ADMIN — INSULIN LISPRO 1 UNITS: 100 INJECTION, SOLUTION INTRAVENOUS; SUBCUTANEOUS at 13:21

## 2020-04-19 RX ADMIN — INSULIN LISPRO 2 UNITS: 100 INJECTION, SOLUTION INTRAVENOUS; SUBCUTANEOUS at 18:00

## 2020-04-19 RX ADMIN — INSULIN LISPRO 1 UNITS: 100 INJECTION, SOLUTION INTRAVENOUS; SUBCUTANEOUS at 08:00

## 2020-04-19 RX ADMIN — OXYCODONE HYDROCHLORIDE AND ACETAMINOPHEN 1000 MG: 500 TABLET ORAL at 17:23

## 2020-04-19 RX ADMIN — INSULIN LISPRO 2 UNITS: 100 INJECTION, SOLUTION INTRAVENOUS; SUBCUTANEOUS at 22:26

## 2020-04-19 RX ADMIN — AMPICILLIN SODIUM AND SULBACTAM SODIUM 3 G: 10; 5 INJECTION, POWDER, FOR SOLUTION INTRAVENOUS at 22:26

## 2020-04-19 RX ADMIN — ACETAMINOPHEN 650 MG: 325 TABLET ORAL at 09:07

## 2020-04-19 RX ADMIN — DEXTROSE AND POTASSIUM CHLORIDE 75 ML/HR: 5; .15 SOLUTION INTRAVENOUS at 09:09

## 2020-04-19 RX ADMIN — ZINC SULFATE 220 MG (50 MG) CAPSULE 220 MG: CAPSULE at 09:07

## 2020-04-19 RX ADMIN — AMPICILLIN SODIUM AND SULBACTAM SODIUM 3 G: 10; 5 INJECTION, POWDER, FOR SOLUTION INTRAVENOUS at 12:00

## 2020-04-19 NOTE — ASSESSMENT & PLAN NOTE
Sodium 150 initially  With D5W improved 147  Today again 149  Will change to IV fluid to D5W  Recheck sodium tomorrow  Likely from dehydration    Patient not eating properly

## 2020-04-19 NOTE — ASSESSMENT & PLAN NOTE
Lab Results   Component Value Date    HGBA1C 8 3 (H) 04/15/2020       Recent Labs     04/18/20  1240 04/18/20  1637 04/18/20 2052 04/19/20  0556   POCGLU 258* 226* 281* 203*       Blood Sugar Average: Last 72 hrs:  Hemoglobin A1c 8 3  Present on admission with history of diabetes  Hold metformin and sliding scale coverage  Blood sugar high today because of steroid and IV fluid D5W  Increase Lantus to 15 units q h s   Monitor closely

## 2020-04-19 NOTE — ASSESSMENT & PLAN NOTE
CT head report noted for incidentally noted 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 per family  Daughter Sis Serve states that [facility] and having right eye injury and patient was seen by ophthalmologist but she never followed up for surgery per daughter  Currently she is almost plan right eye per daughter  Outpatient follow-up

## 2020-04-19 NOTE — ASSESSMENT & PLAN NOTE
Metabolic encephalopathy, POA, likely due to dental abscesses and COVID 19 infection  Mental status improving  CT head negative on admission  Neurology team on board also  Repeat CT head did not show any acute changes also  Yesterday her diet changed to pureed diet    Mental status close to her baseline now

## 2020-04-19 NOTE — PROGRESS NOTES
Progress Note - Sami Basket 1945, 69 y o  female MRN: [id number]    Unit/Bed#: -01 Encounter: [id number]    Primary Care Provider: No primary care provider on file  Date and time admitted to hospital: 4/14/2020 10:12 AM        Hypernatremia  Assessment & Plan  Sodium 150 initially  With D5W improved 147  Today again 149  Will change to IV fluid to D5W  Recheck sodium tomorrow  Likely from dehydration  Patient not eating properly    COVID-19 virus detected  Assessment & Plan  Infectious disease on board  Continue isolation  Continue hydroxychloroquine and azithromycin  Today last dose  Monitor QTC  Follow inflammatory markers   today from 212 yesterday  Discussed with infectious disease  Recommended to start on IV Solu-Medrol  Monitor closely  Patient still breathing on room air  AMS (altered mental status)  Assessment & Plan  Metabolic encephalopathy, POA, likely due to dental abscesses and COVID 19 infection  Mental status improving  CT head negative on admission  Neurology team on board also  Repeat CT head did not show any acute changes also  Yesterday her diet changed to pureed diet  Mental status close to her baseline now    Abnormal finding on CT scan  Assessment & Plan  CT head report noted for incidentally noted 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 per family  Daughter Thomas Amee states that patient had a fall in [facility] [address] approximately 4 years ago and having right eye injury and patient was seen by ophthalmologist but she never followed up for surgery per daughter  Currently she is almost plan right eye per daughter  Outpatient follow-up  JULEE (acute kidney injury) ([facility] [address] )  Assessment & Plan  Suspected acute kidney injury with creatinine 1 72 present admission  Patient lives in [place] and no previous labs or records available to review  Creatinine improving    1 18 today    HTN (hypertension)  Assessment & Plan  Present admission with history of hypertension  Home medication includes Diovan  Hold Diovan in the settings of suspected acute kidney injury  Monitor vitals,  IV Lopressor p r n  DM (diabetes mellitus) [facility])  Assessment & Plan  Lab Results   Component Value Date    HGBA1C 8 3 (H) 04/15/2020       Recent Labs     04/18/20  1240 04/18/20  1637 04/18/20 2052 04/19/20  0556   POCGLU 258* 226* 281* 203*       Blood Sugar Average: Last 72 hrs:  Hemoglobin A1c 8 3  Present on admission with history of diabetes  Hold metformin and sliding scale coverage  Blood sugar high today because of steroid and IV fluid D5W  Increase Lantus to 15 units q h s   Monitor closely  * Fever  Assessment & Plan  64-year-old female past medical history of diabetes, hypertension, lives in [place] but has been living with daughter for about a month in [place]  Patient is currently poor historian, poor insight  History obtained from daughter over the phone  Daughter stated that patient has not been eating adequately with poor p o  Intake lately and has been complaining of having tooth pain for quite some time  On presentation she is noted to have high-grade fevers  Likely secondary to COVID- 19  UA negative  Chest x-ray without any acute finding  CT head negative for acute finding, 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  Patient also has tooth infection  Evaluated by Infectious Disease and oral surgeon  Antibiotics switched to IV Unasyn per ID  Blood cultures no growth in 72 hours  Continue supportive care  F/u With ID and OMFS recommendations  VTE Pharmacologic Prophylaxis:   Pharmacologic: Enoxaparin (Lovenox)  Mechanical VTE Prophylaxis in Place: Yes    Patient Centered Rounds: I have performed bedside rounds with nursing staff today  Discussions with Specialists or Other Care Team Provider:     Education and Discussions with Family / Patient:  Son and daughter    Time Spent for Care:   More than 50% of total time spent on counseling and coordination of care as described above  Current Length of Stay: 5 day(s)    Current Patient Status: Inpatient   Certification Statement: The patient will continue to require additional inpatient hospital stay due to covid, sepsis    Discharge Plan:  Next 48-72 hours    Code Status: Level 1 - Full Code      Subjective:   I have seen the patient this morning  Patient lying on bed  Pain is improving  Mental status also improved  Currently on room air  Denies any shortness of breath    Objective:     Vitals:   Temp (24hrs), Av 1 °F (36 7 °C), Min:98 °F (36 7 °C), Max:98 3 °F (36 8 °C)    Temp:  [98 °F (36 7 °C)-98 3 °F (36 8 °C)] 98 3 °F (36 8 °C)  HR:  [65-77] 75  Resp:  [16-19] 16  BP: (158-188)/(74-96) 158/75  SpO2:  [93 %-96 %] 96 %  Body mass index is 20 28 kg/m²  Input and Output Summary (last 24 hours): Intake/Output Summary (Last 24 hours) at 2020 0849  Last data filed at 2020 0401  Gross per 24 hour   Intake 936 67 ml   Output --   Net 936 67 ml       Physical Exam:     Physical Exam  Limited due to pandemic COVID-19 infection  General- Awake, alert and oriented x 3, looks comfortable  Respiratory system- breathing on room air, not using any accessory muscles  Psych- No acute psychosis  CNS- moving all extremities    Additional Data:     Labs:    Results from last 7 days   Lab Units 20  0558  20  1118   WBC Thousand/uL 13 78*   < > 5 13   HEMOGLOBIN g/dL 10 0*   < > 10 4*   HEMATOCRIT % 31 9*   < > 34 9   PLATELETS Thousands/uL 239   < > 168   BANDS PCT % 12*   < >  --    NEUTROS PCT %  --   --  79*   LYMPHS PCT %  --   --  15   LYMPHO PCT % 9*   < >  --    MONOS PCT %  --   --  6   MONO PCT % 5   < >  --    EOS PCT % 0   < > 0    < > = values in this interval not displayed       Results from last 7 days   Lab Units 20  0558   SODIUM mmol/L 149*   POTASSIUM mmol/L 3 1*   CHLORIDE mmol/L 113*   CO2 mmol/L 24   BUN mg/dL 25 CREATININE mg/dL 1 18   ANION GAP mmol/L 12   CALCIUM mg/dL 8 8   ALBUMIN g/dL 2 1*   TOTAL BILIRUBIN mg/dL 0 30   ALK PHOS U/L 70   ALT U/L 12   AST U/L 27   GLUCOSE RANDOM mg/dL 202*         Results from last 7 days   Lab Units 04/19/20  0556 04/18/20  2052 04/18/20  1637 04/18/20  1240 04/18/20  0914 04/18/20  0628 04/17/20  2137 04/17/20  1641 04/17/20  1040 04/17/20  0551 04/16/20  2039 04/16/20  1551   POC GLUCOSE mg/dl 203* 281* 226* 258* 356* 429* 326* 115 372* 267* 189* 193*     Results from last 7 days   Lab Units 04/15/20  0544   HEMOGLOBIN A1C % 8 3*     Results from last 7 days   Lab Units 04/16/20  0515 04/14/20  2245 04/14/20  1124   LACTIC ACID mmol/L  --   --  1 2   PROCALCITONIN ng/ml 2 67* 0 46*  --            * I Have Reviewed All Lab Data Listed Above  * Additional Pertinent Lab Tests Reviewed: All Labs Within Last 24 Hours Reviewed    Imaging:    Imaging Reports Reviewed Today Include:   Imaging Personally Reviewed by Myself Includes:      Recent Cultures (last 7 days):     Results from last 7 days   Lab Units 04/14/20  1124 04/14/20  1118   BLOOD CULTURE  No Growth After 4 Days  No Growth After 4 Days         Last 24 Hours Medication List:     Current Facility-Administered Medications:  acetaminophen 650 mg Rectal Q4H PRN Librado Birmingham MD    acetaminophen 650 mg Oral TID Librado Birmingham MD    ampicillin-sulbactam 3 g Intravenous Q6H Librado Birmingham MD Last Rate: 3 g (04/19/20 0401)   ascorbic acid 1,000 mg Oral BID Murphy DUNHAM MD    azithromycin 250 mg Oral Q24H Murphy DUNHAM MD    azithromycin 500 mg Oral Once Murphy DUNHAM MD    dextrose 5 % with KCl 20 mEq/L 75 mL/hr Intravenous Continuous Librado Birmingham MD    enoxaparin 40 mg Subcutaneous Q24H [facility] Murphy DUNHAM MD    hydrALAZINE 5 mg Intravenous Q6H PRN Librado Birmingham MD    insulin glargine 15 Units Subcutaneous HS Librado Birmingham MD    insulin lispro 1-5 Units Subcutaneous 4 times day Elyssa Roca MD insulin lispro 1-5 Units Subcutaneous HS Judy Trujillo MD    Lidocaine Viscous HCl 15 mL Swish & Spit 4x Daily PRN Amparo Hunter MD    methylPREDNISolone sodium succinate 40 mg Intravenous Daily Amparo Hunter MD    traMADol 50 mg Oral Q8H PRN Amparo Hunter MD    zinc sulfate 220 mg Oral Daily Jaquelin Wilkes MD         Today, Patient Was Seen By: Lisset Kruger MD    ** Please Note: Dictation voice to text software may have been used in the creation of this document   **

## 2020-04-19 NOTE — ASSESSMENT & PLAN NOTE
Infectious disease on board  Continue isolation  Continue hydroxychloroquine and azithromycin  Today last dose  Monitor QTC  Follow inflammatory markers   today from 212 yesterday  Discussed with infectious disease  Recommended to start on IV Solu-Medrol  Monitor closely  Patient still breathing on room air

## 2020-04-19 NOTE — ASSESSMENT & PLAN NOTE
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

## 2020-04-19 NOTE — ASSESSMENT & PLAN NOTE
Suspected acute kidney injury with creatinine 1 72 present admission  Patient lives in [place] and no previous labs or records available to review  Creatinine improving    1 18 today

## 2020-04-20 PROBLEM — E43 SEVERE PROTEIN-CALORIE MALNUTRITION (HCC): Status: ACTIVE | Noted: 2020-04-20

## 2020-04-20 LAB
1,25(OH)2D3 SERPL-MCNC: 58.1 PG/ML (ref 19.9–79.3)
ALBUMIN SERPL BCP-MCNC: 2 G/DL (ref 3.5–5)
ALP SERPL-CCNC: 71 U/L (ref 46–116)
ALT SERPL W P-5'-P-CCNC: 11 U/L (ref 12–78)
ANION GAP SERPL CALCULATED.3IONS-SCNC: 11 MMOL/L (ref 4–13)
AST SERPL W P-5'-P-CCNC: 22 U/L (ref 5–45)
ATRIAL RATE: 74 BPM
ATRIAL RATE: 76 BPM
BILIRUB SERPL-MCNC: 0.4 MG/DL (ref 0.2–1)
BUN SERPL-MCNC: 21 MG/DL (ref 5–25)
CALCIUM SERPL-MCNC: 8.6 MG/DL (ref 8.3–10.1)
CHLORIDE SERPL-SCNC: 108 MMOL/L (ref 100–108)
CO2 SERPL-SCNC: 25 MMOL/L (ref 21–32)
CREAT SERPL-MCNC: 1.12 MG/DL (ref 0.6–1.3)
CRP SERPL QL: 135.6 MG/L
ERYTHROCYTE [DISTWIDTH] IN BLOOD BY AUTOMATED COUNT: 14.3 % (ref 11.6–15.1)
GFR SERPL CREATININE-BSD FRML MDRD: 56 ML/MIN/1.73SQ M
GLUCOSE SERPL-MCNC: 140 MG/DL (ref 65–140)
GLUCOSE SERPL-MCNC: 145 MG/DL (ref 65–140)
GLUCOSE SERPL-MCNC: 148 MG/DL (ref 65–140)
GLUCOSE SERPL-MCNC: 173 MG/DL (ref 65–140)
GLUCOSE SERPL-MCNC: 258 MG/DL (ref 65–140)
HCT VFR BLD AUTO: 30.3 % (ref 34.8–46.1)
HGB BLD-MCNC: 9.3 G/DL (ref 11.5–15.4)
MCH RBC QN AUTO: 27.7 PG (ref 26.8–34.3)
MCHC RBC AUTO-ENTMCNC: 30.7 G/DL (ref 31.4–37.4)
MCV RBC AUTO: 90 FL (ref 82–98)
NRBC BLD AUTO-RTO: 0 /100 WBCS
P AXIS: 67 DEGREES
P AXIS: 76 DEGREES
PLATELET # BLD AUTO: 227 THOUSANDS/UL (ref 149–390)
PMV BLD AUTO: 11.7 FL (ref 8.9–12.7)
POTASSIUM SERPL-SCNC: 3.3 MMOL/L (ref 3.5–5.3)
PR INTERVAL: 152 MS
PR INTERVAL: 156 MS
PROT SERPL-MCNC: 7 G/DL (ref 6.4–8.2)
QRS AXIS: 50 DEGREES
QRS AXIS: 52 DEGREES
QRSD INTERVAL: 74 MS
QRSD INTERVAL: 78 MS
QT INTERVAL: 362 MS
QT INTERVAL: 372 MS
QTC INTERVAL: 407 MS
QTC INTERVAL: 412 MS
RBC # BLD AUTO: 3.36 MILLION/UL (ref 3.81–5.12)
SODIUM SERPL-SCNC: 144 MMOL/L (ref 136–145)
T WAVE AXIS: 62 DEGREES
T WAVE AXIS: 67 DEGREES
VENTRICULAR RATE: 74 BPM
VENTRICULAR RATE: 76 BPM
WBC # BLD AUTO: 11.73 THOUSAND/UL (ref 4.31–10.16)

## 2020-04-20 PROCEDURE — 93010 ELECTROCARDIOGRAM REPORT: CPT | Performed by: INTERNAL MEDICINE

## 2020-04-20 PROCEDURE — 99232 SBSQ HOSP IP/OBS MODERATE 35: CPT | Performed by: INTERNAL MEDICINE

## 2020-04-20 PROCEDURE — 82948 REAGENT STRIP/BLOOD GLUCOSE: CPT

## 2020-04-20 PROCEDURE — 85027 COMPLETE CBC AUTOMATED: CPT | Performed by: INTERNAL MEDICINE

## 2020-04-20 PROCEDURE — 99233 SBSQ HOSP IP/OBS HIGH 50: CPT | Performed by: INTERNAL MEDICINE

## 2020-04-20 PROCEDURE — 86140 C-REACTIVE PROTEIN: CPT | Performed by: INTERNAL MEDICINE

## 2020-04-20 PROCEDURE — 80053 COMPREHEN METABOLIC PANEL: CPT | Performed by: INTERNAL MEDICINE

## 2020-04-20 RX ORDER — AMOXICILLIN AND CLAVULANATE POTASSIUM 875; 125 MG/1; MG/1
1 TABLET, FILM COATED ORAL EVERY 12 HOURS SCHEDULED
Status: DISCONTINUED | OUTPATIENT
Start: 2020-04-20 | End: 2020-04-22 | Stop reason: HOSPADM

## 2020-04-20 RX ORDER — POTASSIUM CHLORIDE 20MEQ/15ML
20 LIQUID (ML) ORAL ONCE
Status: DISCONTINUED | OUTPATIENT
Start: 2020-04-20 | End: 2020-04-22 | Stop reason: HOSPADM

## 2020-04-20 RX ADMIN — AMPICILLIN SODIUM AND SULBACTAM SODIUM 3 G: 10; 5 INJECTION, POWDER, FOR SOLUTION INTRAVENOUS at 10:37

## 2020-04-20 RX ADMIN — AMPICILLIN SODIUM AND SULBACTAM SODIUM 3 G: 10; 5 INJECTION, POWDER, FOR SOLUTION INTRAVENOUS at 05:39

## 2020-04-20 RX ADMIN — INSULIN LISPRO 2 UNITS: 100 INJECTION, SOLUTION INTRAVENOUS; SUBCUTANEOUS at 22:08

## 2020-04-20 RX ADMIN — ENOXAPARIN SODIUM 40 MG: 40 INJECTION SUBCUTANEOUS at 09:20

## 2020-04-20 RX ADMIN — INSULIN GLARGINE 15 UNITS: 100 INJECTION, SOLUTION SUBCUTANEOUS at 22:14

## 2020-04-20 RX ADMIN — METHYLPREDNISOLONE SODIUM SUCCINATE 40 MG: 40 INJECTION, POWDER, FOR SOLUTION INTRAMUSCULAR; INTRAVENOUS at 09:20

## 2020-04-20 RX ADMIN — ACETAMINOPHEN 650 MG: 325 TABLET ORAL at 22:03

## 2020-04-20 RX ADMIN — AMOXICILLIN AND CLAVULANATE POTASSIUM 1 TABLET: 875; 125 TABLET, FILM COATED ORAL at 22:07

## 2020-04-20 NOTE — ASSESSMENT & PLAN NOTE
Metabolic encephalopathy, POA, likely due to dental abscesses and COVID 19 infection  Mental status improving  CT head negative on admission  Neurology team on board also  Repeat CT head did not show any acute changes also  Yesterday her diet changed to pureed diet    Mental status at her baseline now

## 2020-04-20 NOTE — ASSESSMENT & PLAN NOTE
Infectious disease on board  Continue isolation  Continue hydroxychloroquine and azithromycin  Today last dose  Monitor QTC  Follow inflammatory markers  CRP slowly trending down, 135 today  Discussed with infectious disease  Recommended to start on IV Solu-Medrol  Currently on Solu-Medrol 40 mg daily  Monitor closely  Patient still breathing on room air

## 2020-04-20 NOTE — PROGRESS NOTES
Pt not oriented, becoming combative at times  Pt refusing to take any food, drinks or oral medications  Dr Mango Nieves made aware  Pt resting comfortably in bed, linen changed, and pt bathed  Bed alarm on  Vitals stable  Will continue to monitor

## 2020-04-20 NOTE — ASSESSMENT & PLAN NOTE
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

## 2020-04-20 NOTE — PROGRESS NOTES
Pt offered food and drinks pt refusing to have anything in her mouth, pt stating she does not have pain  Meds where not attempted since she would not even open her mouth for food or anything  Escobar Rivers is aware   Pt resting comfortably in bed

## 2020-04-20 NOTE — PLAN OF CARE
Problem: Prexisting or High Potential for Compromised Skin Integrity  Goal: Skin integrity is maintained or improved  Description  INTERVENTIONS:  - Identify patients at risk for skin breakdown  - Assess and monitor skin integrity  - Assess and monitor nutrition and hydration status  - Monitor labs   - Assess for incontinence   - Turn and reposition patient  - Assist with mobility/ambulation  - Relieve pressure over bony prominences  - Avoid friction and shearing  - Provide appropriate hygiene as needed including keeping skin clean and dry  - Evaluate need for skin moisturizer/barrier cream  - Collaborate with interdisciplinary team   - Patient/family teaching  - Consider wound care consult   Outcome: Progressing     Problem: PAIN - ADULT  Goal: Verbalizes/displays adequate comfort level or baseline comfort level  Description  Interventions:  - Encourage patient to monitor pain and request assistance  - Assess pain using appropriate pain scale  - Administer analgesics based on type and severity of pain and evaluate response  - Implement non-pharmacological measures as appropriate and evaluate response  - Consider cultural and social influences on pain and pain management  - Notify physician/advanced practitioner if interventions unsuccessful or patient reports new pain  Outcome: Progressing     Problem: INFECTION - ADULT  Goal: Absence or prevention of progression during hospitalization  Description  INTERVENTIONS:  - Assess and monitor for signs and symptoms of infection  - Monitor lab/diagnostic results  - Monitor all insertion sites, i e  indwelling lines, tubes, and drains  - Monitor endotracheal if appropriate and nasal secretions for changes in amount and color  - [place] appropriate cooling/warming therapies per order  - Administer medications as ordered  - Instruct and encourage patient and family to use good hand hygiene technique  - Identify and instruct in appropriate isolation precautions for identified infection/condition  Outcome: Progressing  Goal: Absence of fever/infection during neutropenic period  Description  INTERVENTIONS:  - Monitor WBC    Outcome: Progressing     Problem: SAFETY ADULT  Goal: Patient will remain free of falls  Description  INTERVENTIONS:  - Assess patient frequently for physical needs  -  Identify cognitive and physical deficits and behaviors that affect risk of falls    -  [place] fall precautions as indicated by assessment   - Educate patient/family on patient safety including physical limitations  - Instruct patient to call for assistance with activity based on assessment  - Modify environment to reduce risk of injury  - Consider OT/PT consult to assist with strengthening/mobility  Outcome: Progressing  Goal: Maintain or return to baseline ADL function  Description  INTERVENTIONS:  -  Assess patient's ability to carry out ADLs; assess patient's baseline for ADL function and identify physical deficits which impact ability to perform ADLs (bathing, care of mouth/teeth, toileting, grooming, dressing, etc )  - Assess/evaluate cause of self-care deficits   - Assess range of motion  - Assess patient's mobility; develop plan if impaired  - Assess patient's need for assistive devices and provide as appropriate  - Encourage maximum independence but intervene and supervise when necessary  - Involve family in performance of ADLs  - Assess for home care needs following discharge   - Consider OT consult to assist with ADL evaluation and planning for discharge  - Provide patient education as appropriate  Outcome: Progressing  Goal: Maintain or return mobility status to optimal level  Description  INTERVENTIONS:  - Assess patient's baseline mobility status (ambulation, transfers, stairs, etc )    - Identify cognitive and physical deficits and behaviors that affect mobility  - Identify mobility aids required to assist with transfers and/or ambulation (gait belt, sit-to-stand, lift, walker, cane, etc )  - [place] fall precautions as indicated by assessment  - Record patient progress and toleration of activity level on Mobility SBAR; progress patient to next Phase/Stage  - Instruct patient to call for assistance with activity based on assessment  - Consider rehabilitation consult to assist with strengthening/weightbearing, etc   Outcome: Progressing     Problem: DISCHARGE PLANNING  Goal: Discharge to home or other facility with appropriate resources  Description  INTERVENTIONS:  - Identify barriers to discharge w/patient and caregiver  - Arrange for needed discharge resources and transportation as appropriate  - Identify discharge learning needs (meds, wound care, etc )  - Arrange for interpretive services to assist at discharge as needed  - Refer to Case Management Department for coordinating discharge planning if the patient needs post-hospital services based on physician/advanced practitioner order or complex needs related to functional status, cognitive ability, or social support system  Outcome: Progressing     Problem: Knowledge Deficit  Goal: Patient/family/caregiver demonstrates understanding of disease process, treatment plan, medications, and discharge instructions  Description  Complete learning assessment and assess knowledge base  Interventions:  - Provide teaching at level of understanding  - Provide teaching via preferred learning methods  Outcome: Progressing     Problem: Nutrition/Hydration-ADULT  Goal: Nutrient/Hydration intake appropriate for improving, restoring or maintaining nutritional needs  Description  Monitor and assess patient's nutrition/hydration status for malnutrition  Collaborate with interdisciplinary team and initiate plan and interventions as ordered  Monitor patient's weight and dietary intake as ordered or per policy  Utilize nutrition screening tool and intervene as necessary   Determine patient's food preferences and provide high-protein, high-caloric foods as appropriate  INTERVENTIONS:  - Monitor oral intake, urinary output, labs, and treatment plans  - Assess nutrition and hydration status and recommend course of action  - Evaluate amount of meals eaten  - Assist patient with eating if necessary   - Allow adequate time for meals  - Recommend/ encourage appropriate diets, oral nutritional supplements, and vitamin/mineral supplements  - Order, calculate, and assess calorie counts as needed  - Recommend, monitor, and adjust tube feedings based on assessed needs  - Assess need for intravenous fluids  - Provide nutrition/hydration education as appropriate  - Include patient/family/caregiver in decisions related to nutrition   Outcome: Progressing     Problem: Potential for Falls  Goal: Patient will remain free of falls  Description  INTERVENTIONS:  - Assess patient frequently for physical needs  -  Identify cognitive and physical deficits and behaviors that affect risk of falls    -  [place] fall precautions as indicated by assessment   - Educate patient/family on patient safety including physical limitations  - Instruct patient to call for assistance with activity based on assessment  - Modify environment to reduce risk of injury  - Consider OT/PT consult to assist with strengthening/mobility  Outcome: Progressing

## 2020-04-20 NOTE — ASSESSMENT & PLAN NOTE
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

## 2020-04-20 NOTE — ASSESSMENT & PLAN NOTE
Sodium 150 initially  With D5W improved 147  Sodium 144 today  Monitor closely    Her oral intake gradually improving now

## 2020-04-20 NOTE — PROGRESS NOTES
Progress Note - Infectious Disease   Maria Alejandra White 69 y o  female MRN: [id number]  Unit/Bed#: -01 Encounter: [id number]      Impression/Recommendations:  1   COVID pneumonia  Patient did not improved with HCQ/azithromycin but improved dramatically with systemic corticosteroid  Temperature is down  CRP decreasing  Respiratory status improved, with patient now off O2 supplement  Patient completed antibiotic course  No further antiviral    Monitor respiratory symptoms  Monitor temperature/WBC  Monitor inflammatory markers  Monitor respiratory symptoms  Continue systemic corticosteroid  Can start weaning      2  Right maxillary tooth abscesses   These are clearly etiology of patient's right upper jaw pain and refusal to eat   Patient is clinically improved   Admission blood cultures remain negative  Antibiotic can be transitioned to p o  Change antibiotic to p o  Augmentin  Monitor temperature/WBC  Monitor jaw pain  Treat x 14 days total, another 8 days      3  Encephalopathy   It is unclear what her baseline mental status is   Regardless, patient's encephalopathy is most likely metabolic, secondary to COVID   Head CT without acute changes   There are no clinical signs of CNS infection   Neurology evaluation noted  Mental status much improved  Treatment plan as in above  Monitor mental status      4  JULEE, POA, likely secondary to prerenal state from decreased p o  intake   Creatinine much improved with IV fluid hydration  Antibiotic at full dose for now  Monitor creatinine      5  DM, poorly controlled, with elevated hemoglobin A1c   This is risk factor for infections above  Management per primary service      Discussed with patient  Discussed with Dr Marcelino from [facility] service  Okay for discharge from ID viewpoint  I spent 45 minutes on the unit floor, of which 25 minutes were spent in counseling and coordination of care, as outlined above      Antibiotics:  Unasyn # 6    Subjective:  Patient is much more awake and alert  He is still somewhat confused  Jaw pain is improved  Temperature intermittently up but overall improving  No diarrhea  Objective:  Vitals:  Temp:  [98 7 °F (37 1 °C)-101 9 °F (38 8 °C)] 98 7 °F (37 1 °C)  HR:  [63-73] 71  Resp:  [17-20] 18  BP: (142-165)/(69-81) 142/69  SpO2:  [91 %-95 %] 93 %  Temp (24hrs), Av 3 °F (37 9 °C), Min:98 7 °F (37 1 °C), Max:101 9 °F (38 8 °C)  Current: Temperature: 98 7 °F (37 1 °C)    Physical Exam:    Parts of the exam were were done by the Medicine and nursing services and discussed with me, due to the infection control issues related to the current COVID crisis  General: More awake and alert  Comfortable  Nontoxic  Improved confusion  Lungs: Decreased breath sounds, no rales, no wheezing, respirations unlabored  Heart:  Regular rate and rhythm, S1 and S2 normal, no murmur  Abdomen: Soft, nondistended, non-tender, bowel sounds active all four quadrants, no masses, no organomegaly  Extremities: No edema  No erythema/warmth  No ulcer  Nontender to palpation  Skin:  No rash  Neuro: Moves all extremities  Invasive Devices     Peripheral Intravenous Line            Peripheral IV 20 Arm 1 day                Labs studies:   I have personally reviewed pertinent labs    Results from last 7 days   Lab Units 20  0547 20  0558 20  0445   POTASSIUM mmol/L 3 3* 3 1* 4 3   CHLORIDE mmol/L 108 113* 111*   CO2 mmol/L 25 24 19*   BUN mg/dL 21 25 27*   CREATININE mg/dL 1 12 1 18 1 28   EGFR ml/min/1 73sq m 56 53 48   CALCIUM mg/dL 8 6 8 8 8 5   AST U/L 22 27 36   ALT U/L 11* 12 13   ALK PHOS U/L 71 70 67     Results from last 7 days   Lab Units 20  0547 20  0558 20  0445   WBC Thousand/uL 11 73* 13 78* 10 01   HEMOGLOBIN g/dL 9 3* 10 0* 9 3*   PLATELETS Thousands/uL 227 239 203     Results from last 7 days   Lab Units 20  1124 20  1118   BLOOD CULTURE  No Growth After 5 Days  No Growth After 5 Days  Imaging Studies:   I have personally reviewed pertinent imaging study reports and images in PACS  EKG, Pathology, and Other Studies:   I have personally reviewed pertinent reports

## 2020-04-20 NOTE — SPEECH THERAPY NOTE
Dysphagia f/u continued  Pureed diet with nectar thick liquids offered all weekend (0-25% intake documented)  Pt refusing food at this time  Will follow along 3x weekly in hopes of improved medical and MS, as well as improving oral intake

## 2020-04-20 NOTE — PLAN OF CARE
Problem: Prexisting or High Potential for Compromised Skin Integrity  Goal: Skin integrity is maintained or improved  Description  INTERVENTIONS:  - Identify patients at risk for skin breakdown  - Assess and monitor skin integrity  - Assess and monitor nutrition and hydration status  - Monitor labs   - Assess for incontinence   - Turn and reposition patient  - Assist with mobility/ambulation  - Relieve pressure over bony prominences  - Avoid friction and shearing  - Provide appropriate hygiene as needed including keeping skin clean and dry  - Evaluate need for skin moisturizer/barrier cream  - Collaborate with interdisciplinary team   - Patient/family teaching  - Consider wound care consult   Outcome: Progressing     Problem: PAIN - ADULT  Goal: Verbalizes/displays adequate comfort level or baseline comfort level  Description  Interventions:  - Encourage patient to monitor pain and request assistance  - Assess pain using appropriate pain scale  - Administer analgesics based on type and severity of pain and evaluate response  - Implement non-pharmacological measures as appropriate and evaluate response  - Consider cultural and social influences on pain and pain management  - Notify physician/advanced practitioner if interventions unsuccessful or patient reports new pain  Outcome: Progressing     Problem: INFECTION - ADULT  Goal: Absence or prevention of progression during hospitalization  Description  INTERVENTIONS:  - Assess and monitor for signs and symptoms of infection  - Monitor lab/diagnostic results  - Monitor all insertion sites, i e  indwelling lines, tubes, and drains  - Monitor endotracheal if appropriate and nasal secretions for changes in amount and color  - [place] appropriate cooling/warming therapies per order  - Administer medications as ordered  - Instruct and encourage patient and family to use good hand hygiene technique  - Identify and instruct in appropriate isolation precautions for identified infection/condition  Outcome: Progressing  Goal: Absence of fever/infection during neutropenic period  Description  INTERVENTIONS:  - Monitor WBC    Outcome: Progressing     Problem: SAFETY ADULT  Goal: Patient will remain free of falls  Description  INTERVENTIONS:  - Assess patient frequently for physical needs  -  Identify cognitive and physical deficits and behaviors that affect risk of falls    -  [place] fall precautions as indicated by assessment   - Educate patient/family on patient safety including physical limitations  - Instruct patient to call for assistance with activity based on assessment  - Modify environment to reduce risk of injury  - Consider OT/PT consult to assist with strengthening/mobility  Outcome: Progressing  Goal: Maintain or return to baseline ADL function  Description  INTERVENTIONS:  -  Assess patient's ability to carry out ADLs; assess patient's baseline for ADL function and identify physical deficits which impact ability to perform ADLs (bathing, care of mouth/teeth, toileting, grooming, dressing, etc )  - Assess/evaluate cause of self-care deficits   - Assess range of motion  - Assess patient's mobility; develop plan if impaired  - Assess patient's need for assistive devices and provide as appropriate  - Encourage maximum independence but intervene and supervise when necessary  - Involve family in performance of ADLs  - Assess for home care needs following discharge   - Consider OT consult to assist with ADL evaluation and planning for discharge  - Provide patient education as appropriate  Outcome: Progressing  Goal: Maintain or return mobility status to optimal level  Description  INTERVENTIONS:  - Assess patient's baseline mobility status (ambulation, transfers, stairs, etc )    - Identify cognitive and physical deficits and behaviors that affect mobility  - Identify mobility aids required to assist with transfers and/or ambulation (gait belt, sit-to-stand, lift, walker, cane, etc )  - [place] fall precautions as indicated by assessment  - Record patient progress and toleration of activity level on Mobility SBAR; progress patient to next Phase/Stage  - Instruct patient to call for assistance with activity based on assessment  - Consider rehabilitation consult to assist with strengthening/weightbearing, etc   Outcome: Progressing     Problem: DISCHARGE PLANNING  Goal: Discharge to home or other facility with appropriate resources  Description  INTERVENTIONS:  - Identify barriers to discharge w/patient and caregiver  - Arrange for needed discharge resources and transportation as appropriate  - Identify discharge learning needs (meds, wound care, etc )  - Arrange for interpretive services to assist at discharge as needed  - Refer to Case Management Department for coordinating discharge planning if the patient needs post-hospital services based on physician/advanced practitioner order or complex needs related to functional status, cognitive ability, or social support system  Outcome: Progressing     Problem: Knowledge Deficit  Goal: Patient/family/caregiver demonstrates understanding of disease process, treatment plan, medications, and discharge instructions  Description  Complete learning assessment and assess knowledge base  Interventions:  - Provide teaching at level of understanding  - Provide teaching via preferred learning methods  Outcome: Progressing     Problem: Nutrition/Hydration-ADULT  Goal: Nutrient/Hydration intake appropriate for improving, restoring or maintaining nutritional needs  Description  Monitor and assess patient's nutrition/hydration status for malnutrition  Collaborate with interdisciplinary team and initiate plan and interventions as ordered  Monitor patient's weight and dietary intake as ordered or per policy  Utilize nutrition screening tool and intervene as necessary   Determine patient's food preferences and provide high-protein, high-caloric foods as appropriate  INTERVENTIONS:  - Monitor oral intake, urinary output, labs, and treatment plans  - Assess nutrition and hydration status and recommend course of action  - Evaluate amount of meals eaten  - Assist patient with eating if necessary   - Allow adequate time for meals  - Recommend/ encourage appropriate diets, oral nutritional supplements, and vitamin/mineral supplements  - Order, calculate, and assess calorie counts as needed  - Recommend, monitor, and adjust tube feedings based on assessed needs  - Assess need for intravenous fluids  - Provide nutrition/hydration education as appropriate  - Include patient/family/caregiver in decisions related to nutrition   Outcome: Progressing     Problem: Potential for Falls  Goal: Patient will remain free of falls  Description  INTERVENTIONS:  - Assess patient frequently for physical needs  -  Identify cognitive and physical deficits and behaviors that affect risk of falls    -  [place] fall precautions as indicated by assessment   - Educate patient/family on patient safety including physical limitations  - Instruct patient to call for assistance with activity based on assessment  - Modify environment to reduce risk of injury  - Consider OT/PT consult to assist with strengthening/mobility  Outcome: Progressing

## 2020-04-20 NOTE — MALNUTRITION/BMI
This medical record reflects one or more clinical indicators suggestive of malnutrition  Malnutrition Findings:   Malnutrition type: Acute illness  Degree of Malnutrition: Other severe protein calorie malnutrition(related to inadequate energy intake as evidenced by <50% energy intake compared to estimated energy needs for > 5 days, visible clavicle, wasted buccal pads  Intervention Puree NTL diet, Magic cup with meals  )  Malnutrition Characteristics: Inadequate energy    BMI Findings: Body mass index is 20 28 kg/m²  See Nutrition note dated 4/20/2020 for additional details  Completed nutrition assessment is viewable in the nutrition documentation

## 2020-04-20 NOTE — ASSESSMENT & PLAN NOTE
Lab Results   Component Value Date    HGBA1C 8 3 (H) 04/15/2020       Recent Labs     04/19/20  1310 04/19/20  1623 04/19/20 2048 04/20/20  0548   POCGLU 206* 226* 274* 148*       Blood Sugar Average: Last 72 hrs:  Hemoglobin A1c 8 3  Present on admission with history of diabetes  Hold metformin and sliding scale coverage  Blood sugar high today because of steroid and IV fluid D5W  Increase Lantus to 15 units q h s   Monitor closely

## 2020-04-20 NOTE — ASSESSMENT & PLAN NOTE
Suspected acute kidney injury with creatinine 1 72 present admission  Patient lives in [place] and no previous labs or records available to review  Creatinine improving    1 12 today

## 2020-04-20 NOTE — PROGRESS NOTES
Progress Note - Jonathan Pleitez 1945, 69 y o  female MRN: [id number]    Unit/Bed#: -01 Encounter: [id number]    Primary Care Provider: No primary care provider on file  Date and time admitted to hospital: 4/14/2020 10:12 AM        Hypernatremia  Assessment & Plan  Sodium 150 initially  With D5W improved 147  Sodium 144 today  Monitor closely  Her oral intake gradually improving now    COVID-19 virus detected  Assessment & Plan  Infectious disease on board  Continue isolation  Continue hydroxychloroquine and azithromycin  Today last dose  Monitor QTC  Follow inflammatory markers  CRP slowly trending down, 135 today  Discussed with infectious disease  Recommended to start on IV Solu-Medrol  Currently on Solu-Medrol 40 mg daily  Monitor closely  Patient still breathing on room air  AMS (altered mental status)  Assessment & Plan  Metabolic encephalopathy, POA, likely due to dental abscesses and COVID 19 infection  Mental status improving  CT head negative on admission  Neurology team on board also  Repeat CT head did not show any acute changes also  Yesterday her diet changed to pureed diet  Mental status at her baseline now    Abnormal finding on CT scan  Assessment & Plan  CT head report noted for incidentally noted 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 per family  Daughter [facility] states that patient had a fall in [facility] approximately 4 years ago and having right eye injury and patient was seen by ophthalmologist but she never followed up for surgery per daughter  Currently she is almost plan right eye per daughter  Outpatient follow-up  JULEE (acute kidney injury) ([facility] [address] )  Assessment & Plan  Suspected acute kidney injury with creatinine 1 72 present admission  Patient lives in [place] and no previous labs or records available to review  Creatinine improving    1 12 today    HTN (hypertension)  Assessment & Plan  Present admission with history of hypertension  Home medication includes Diovan  Hold Diovan in the settings of suspected acute kidney injury  Monitor vitals,  IV Lopressor p r n  DM (diabetes mellitus) [facility])  Assessment & Plan  Lab Results   Component Value Date    HGBA1C 8 3 (H) 04/15/2020       Recent Labs     04/19/20  1310 04/19/20  1623 04/19/20  2048 04/20/20  0548   POCGLU 206* 226* 274* 148*       Blood Sugar Average: Last 72 hrs:  Hemoglobin A1c 8 3  Present on admission with history of diabetes  Hold metformin and sliding scale coverage  Blood sugar high today because of steroid and IV fluid D5W  Increase Lantus to 15 units q h s   Monitor closely  * Fever  Assessment & Plan  79-year-old female past medical history of diabetes, hypertension, lives in [place] but has been living with daughter for about a month in [place]  Patient is currently poor historian, poor insight  History obtained from daughter over the phone  Daughter stated that patient has not been eating adequately with poor p o  Intake lately and has been complaining of having tooth pain for quite some time  On presentation she is noted to have high-grade fevers  Likely secondary to COVID- 19  UA negative  Chest x-ray without any acute finding  CT head negative for acute finding, 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  Patient also has tooth infection  Evaluated by Infectious Disease and oral surgeon  Antibiotics switched to IV Unasyn per ID  Blood cultures no growth  Continue supportive care  F/u With ID and OMFS recommendations  VTE Pharmacologic Prophylaxis:   Pharmacologic: Enoxaparin (Lovenox)  Mechanical VTE Prophylaxis in Place: Yes    Patient Centered Rounds: I have performed bedside rounds with nursing staff today  Discussions with Specialists or Other Care Team Provider:     Education and Discussions with Family / Patient:     Time Spent for Care:    More than 50% of total time spent on counseling and coordination of care as described above  Current Length of Stay: 6 day(s)    Current Patient Status: Inpatient   Certification Statement: The patient will continue to require additional inpatient hospital stay due to Fever    Discharge Plan:  Next 48-72 hours    Code Status: Level 1 - Full Code      Subjective:   I have seen the patient this morning  Patient lying on bed  She feels much better now  However still has some pain in her mouth  Still having fever  Remains on room air    Objective:     Vitals:   Temp (24hrs), Av 1 °F (38 4 °C), Min:100 2 °F (37 9 °C), Max:101 9 °F (38 8 °C)    Temp:  [100 2 °F (37 9 °C)-101 9 °F (38 8 °C)] 101 9 °F (38 8 °C)  HR:  [63-73] 73  Resp:  [17-20] 18  BP: (142-165)/(81) 142/81  SpO2:  [92 %-95 %] 94 %  Body mass index is 20 28 kg/m²  Input and Output Summary (last 24 hours): Intake/Output Summary (Last 24 hours) at 2020 0756  Last data filed at 2020 2226  Gross per 24 hour   Intake 996 25 ml   Output --   Net 996 25 ml       Physical Exam:     Physical Exam  Limited due to pandemic COVID-19 infection  General- Awake, alert, looks comfortable  Respiratory system- breathing on room air, not using any accessory muscles  Psych- No acute psychosis  CNS- moving all extremities    Additional Data:     Labs:    Results from last 7 days   Lab Units 20  0547 20  0558  20  1118   WBC Thousand/uL 11 73* 13 78*   < > 5 13   HEMOGLOBIN g/dL 9 3* 10 0*   < > 10 4*   HEMATOCRIT % 30 3* 31 9*   < > 34 9   PLATELETS Thousands/uL 227 239   < > 168   BANDS PCT %  --  12*   < >  --    NEUTROS PCT %  --   --   --  79*   LYMPHS PCT %  --   --   --  15   LYMPHO PCT %  --  9*   < >  --    MONOS PCT %  --   --   --  6   MONO PCT %  --  5   < >  --    EOS PCT %  --  0   < > 0    < > = values in this interval not displayed       Results from last 7 days   Lab Units 20  0547   SODIUM mmol/L 144   POTASSIUM mmol/L 3 3*   CHLORIDE mmol/L 108   CO2 mmol/L 25   BUN mg/dL 21   CREATININE mg/dL 1 12   ANION GAP mmol/L 11   CALCIUM mg/dL 8 6   ALBUMIN g/dL 2 0*   TOTAL BILIRUBIN mg/dL 0 40   ALK PHOS U/L 71   ALT U/L 11*   AST U/L 22   GLUCOSE RANDOM mg/dL 145*         Results from last 7 days   Lab Units 04/20/20  0548 04/19/20  2048 04/19/20  1623 04/19/20  1310 04/19/20  0922 04/19/20  0556 04/18/20  2052 04/18/20  1637 04/18/20  1240 04/18/20  0914 04/18/20  0628 04/17/20  2137   POC GLUCOSE mg/dl 148* 274* 226* 206* 242* 203* 281* 226* 258* 356* 429* 326*     Results from last 7 days   Lab Units 04/15/20  0544   HEMOGLOBIN A1C % 8 3*     Results from last 7 days   Lab Units 04/16/20  0515 04/14/20  2245 04/14/20  1124   LACTIC ACID mmol/L  --   --  1 2   PROCALCITONIN ng/ml 2 67* 0 46*  --            * I Have Reviewed All Lab Data Listed Above  * Additional Pertinent Lab Tests Reviewed: All Labs Within Last 24 Hours Reviewed    Imaging:    Imaging Reports Reviewed Today Include:   Imaging Personally Reviewed by Myself Includes:      Recent Cultures (last 7 days):     Results from last 7 days   Lab Units 04/14/20  1124 04/14/20  1118   BLOOD CULTURE  No Growth After 5 Days  No Growth After 5 Days         Last 24 Hours Medication List:     Current Facility-Administered Medications:  acetaminophen 650 mg Rectal Q4H PRN Nancy Garner MD    acetaminophen 650 mg Oral TID Nancy Garner MD    ampicillin-sulbactam 3 g Intravenous Q6H Nancy Garner MD Last Rate: 3 g (04/20/20 0539)   ascorbic acid 1,000 mg Oral BID Murphy DUNHAM MD    azithromycin 500 mg Oral Once Murphy DUNHAM MD    dextrose 5 % with KCl 20 mEq/L 50 mL/hr Intravenous Continuous Nancy Garner MD Last Rate: 75 mL/hr (04/19/20 2228)   enoxaparin 40 mg Subcutaneous Q24H [address] Murphy DUNHAM MD    hydrALAZINE 5 mg Intravenous Q6H PRN Nancy Garner MD    insulin glargine 15 Units Subcutaneous HS Nancy Garner MD    insulin lispro 1-5 Units Subcutaneous 4 times day Monse Gr MD    insulin lispro 1-5 Units Subcutaneous HS Ben Galeas MD    Lidocaine Viscous HCl 15 mL Swish & Spit 4x Daily PRN Nic Pelletier MD    methylPREDNISolone sodium succinate 40 mg Intravenous Daily Nic Pelletier MD    potassium chloride 20 mEq Oral Once Nic Pelletier MD    traMADol 50 mg Oral Q8H PRN Nic Pelletier MD    zinc sulfate 220 mg Oral Daily Peter Cleveland MD         Today, Patient Was Seen By: Yemi Cortes MD    ** Please Note: Dictation voice to text software may have been used in the creation of this document   **

## 2020-04-21 PROBLEM — K04.7 TOOTH ABSCESS: Status: ACTIVE | Noted: 2020-04-21

## 2020-04-21 LAB
ANION GAP SERPL CALCULATED.3IONS-SCNC: 10 MMOL/L (ref 4–13)
BUN SERPL-MCNC: 25 MG/DL (ref 5–25)
CALCIUM SERPL-MCNC: 8.5 MG/DL (ref 8.3–10.1)
CHLORIDE SERPL-SCNC: 101 MMOL/L (ref 100–108)
CO2 SERPL-SCNC: 27 MMOL/L (ref 21–32)
CREAT SERPL-MCNC: 1.2 MG/DL (ref 0.6–1.3)
CRP SERPL QL: 140.5 MG/L
ERYTHROCYTE [DISTWIDTH] IN BLOOD BY AUTOMATED COUNT: 14.4 % (ref 11.6–15.1)
GFR SERPL CREATININE-BSD FRML MDRD: 52 ML/MIN/1.73SQ M
GLUCOSE SERPL-MCNC: 270 MG/DL (ref 65–140)
GLUCOSE SERPL-MCNC: 276 MG/DL (ref 65–140)
GLUCOSE SERPL-MCNC: 282 MG/DL (ref 65–140)
GLUCOSE SERPL-MCNC: 289 MG/DL (ref 65–140)
GLUCOSE SERPL-MCNC: 294 MG/DL (ref 65–140)
GLUCOSE SERPL-MCNC: 324 MG/DL (ref 65–140)
GLUCOSE SERPL-MCNC: 341 MG/DL (ref 65–140)
HCT VFR BLD AUTO: 33.4 % (ref 34.8–46.1)
HGB BLD-MCNC: 10.2 G/DL (ref 11.5–15.4)
MCH RBC QN AUTO: 27.6 PG (ref 26.8–34.3)
MCHC RBC AUTO-ENTMCNC: 30.5 G/DL (ref 31.4–37.4)
MCV RBC AUTO: 90 FL (ref 82–98)
NRBC BLD AUTO-RTO: 0 /100 WBCS
PLATELET # BLD AUTO: 248 THOUSANDS/UL (ref 149–390)
PMV BLD AUTO: 12 FL (ref 8.9–12.7)
POTASSIUM SERPL-SCNC: 3.3 MMOL/L (ref 3.5–5.3)
RBC # BLD AUTO: 3.7 MILLION/UL (ref 3.81–5.12)
SODIUM SERPL-SCNC: 138 MMOL/L (ref 136–145)
WBC # BLD AUTO: 8.19 THOUSAND/UL (ref 4.31–10.16)

## 2020-04-21 PROCEDURE — 99232 SBSQ HOSP IP/OBS MODERATE 35: CPT | Performed by: INTERNAL MEDICINE

## 2020-04-21 PROCEDURE — 86140 C-REACTIVE PROTEIN: CPT | Performed by: INTERNAL MEDICINE

## 2020-04-21 PROCEDURE — 85025 COMPLETE CBC W/AUTO DIFF WBC: CPT | Performed by: STUDENT IN AN ORGANIZED HEALTH CARE EDUCATION/TRAINING PROGRAM

## 2020-04-21 PROCEDURE — 82948 REAGENT STRIP/BLOOD GLUCOSE: CPT

## 2020-04-21 PROCEDURE — 99232 SBSQ HOSP IP/OBS MODERATE 35: CPT | Performed by: STUDENT IN AN ORGANIZED HEALTH CARE EDUCATION/TRAINING PROGRAM

## 2020-04-21 PROCEDURE — 80048 BASIC METABOLIC PNL TOTAL CA: CPT | Performed by: STUDENT IN AN ORGANIZED HEALTH CARE EDUCATION/TRAINING PROGRAM

## 2020-04-21 RX ORDER — HYDROCHLOROTHIAZIDE 12.5 MG/1
12.5 TABLET ORAL DAILY
Status: DISCONTINUED | OUTPATIENT
Start: 2020-04-21 | End: 2020-04-22 | Stop reason: HOSPADM

## 2020-04-21 RX ORDER — LOSARTAN POTASSIUM 25 MG/1
12.5 TABLET ORAL DAILY
Status: DISCONTINUED | OUTPATIENT
Start: 2020-04-21 | End: 2020-04-22 | Stop reason: HOSPADM

## 2020-04-21 RX ORDER — INSULIN GLARGINE 100 [IU]/ML
20 INJECTION, SOLUTION SUBCUTANEOUS
Status: DISCONTINUED | OUTPATIENT
Start: 2020-04-21 | End: 2020-04-22 | Stop reason: HOSPADM

## 2020-04-21 RX ADMIN — INSULIN GLARGINE 20 UNITS: 100 INJECTION, SOLUTION SUBCUTANEOUS at 22:27

## 2020-04-21 RX ADMIN — HYDROCHLOROTHIAZIDE 12.5 MG: 12.5 TABLET ORAL at 11:54

## 2020-04-21 RX ADMIN — INSULIN LISPRO 2 UNITS: 100 INJECTION, SOLUTION INTRAVENOUS; SUBCUTANEOUS at 11:19

## 2020-04-21 RX ADMIN — ACETAMINOPHEN 650 MG: 325 TABLET ORAL at 11:18

## 2020-04-21 RX ADMIN — OXYCODONE HYDROCHLORIDE AND ACETAMINOPHEN 1000 MG: 500 TABLET ORAL at 11:19

## 2020-04-21 RX ADMIN — INSULIN LISPRO 2 UNITS: 100 INJECTION, SOLUTION INTRAVENOUS; SUBCUTANEOUS at 22:28

## 2020-04-21 RX ADMIN — DEXTROSE AND POTASSIUM CHLORIDE 50 ML/HR: 5; .15 SOLUTION INTRAVENOUS at 12:03

## 2020-04-21 RX ADMIN — AMOXICILLIN AND CLAVULANATE POTASSIUM 1 TABLET: 875; 125 TABLET, FILM COATED ORAL at 11:18

## 2020-04-21 RX ADMIN — LOSARTAN POTASSIUM 12.5 MG: 25 TABLET, FILM COATED ORAL at 11:54

## 2020-04-21 RX ADMIN — PREDNISONE 50 MG: 20 TABLET ORAL at 11:17

## 2020-04-21 RX ADMIN — ZINC SULFATE 220 MG (50 MG) CAPSULE 220 MG: CAPSULE at 11:19

## 2020-04-21 NOTE — ASSESSMENT & PLAN NOTE
Patient has right maxillary tooth abscess  This morning a fallen tooth found in her bed per nursing  Patient was on IV Unasyn a on Augmentin  Plan is to continue Augmentin for total 14 day course  Currently afebrile, hemodynamically stable  Follow-up with OMFS outpatient

## 2020-04-21 NOTE — PROGRESS NOTES
Progress Note - Infectious Disease   Juana Leonardo 69 y o  female MRN: [id number]  Unit/Bed#: -01 Encounter: [id number]      Impression/Recommendations:  1   COVID pneumonia  Patient did not improved with HCQ/azithromycin but improved dramatically with systemic corticosteroid  Temperature is down  CRP decreasing  Respiratory status improved, with patient now off O2 supplement  Patient completed antibiotic course  No further antiviral    Monitor respiratory symptoms  Monitor temperature/WBC  Monitor inflammatory markers  Monitor respiratory symptoms  Continue systemic corticosteroid  Weaning in progress      2  Right maxillary tooth abscesses   These are clearly etiology of patient's right upper jaw pain and refusal to eat   Patient is clinically improved   Admission blood cultures remain negative  Antibiotic was transitioned to p o  Patient remains clinically well  Continue p o  Augmentin  Monitor temperature/WBC  Monitor jaw pain  Treat x 14 days total, another 7 days      3  Encephalopathy   It is unclear what her baseline mental status is   Regardless, patient's encephalopathy is most likely metabolic, secondary to COVID   Head CT without acute changes   There are no clinical signs of CNS infection   Neurology evaluation noted  Mental status continues to improve  Treatment plan as in above  Monitor mental status      4  JULEE, POA, likely secondary to prerenal state from decreased p o  intake   Creatinine much improved with IV fluid hydration  Antibiotic at full dose for now  Monitor creatinine      5  DM, poorly controlled, with elevated hemoglobin A1c   This is risk factor for infections above  Management per primary service      Discussed with patient  Discussed with Dr Lima from [facility] service  Okay for discharge from ID viewpoint      I spent 30 minutes on the unit floor, of which 15 minutes were spent in counseling and coordination of care, as outlined above Antibiotics:  Augmentin  Antibiotic # 7     Subjective:  Patient is comfortable  Mental status slowly improving  Jaw pain better  No dyspnea  Temperature stays down  No chills  No diarrhea  Objective:  Vitals:  Temp:  [97 2 °F (36 2 °C)-99 2 °F (37 3 °C)] 97 2 °F (36 2 °C)  HR:  [59-65] 60  Resp:  [17-18] 18  BP: (133-162)/(68-92) 136/76  SpO2:  [93 %-97 %] 96 %  Temp (24hrs), Av 1 °F (36 7 °C), Min:97 2 °F (36 2 °C), Max:99 2 °F (37 3 °C)  Current: Temperature: (!) 97 2 °F (36 2 °C)    Physical Exam:    Parts of the exam were were done by the Medicine and nursing services and discussed with me, due to the infection control issues related to the current COVID crisis  General: More awake and alert, comfortable, no distress  Improving confusion  Lungs: Decreased breath sounds, no rales, no wheezing, respirations unlabored  Heart:  Regular rate and rhythm, S1 and S2 normal, no murmur  Abdomen: Soft, nondistended, non-tender, bowel sounds active all four quadrants, no masses, no organomegaly  Extremities: No edema  No erythema/warmth  No ulcer  Nontender to palpation  Skin:  No rash  Neuro: Moves all extremities  Invasive Devices     Peripheral Intravenous Line            Peripheral IV 20 Arm 2 days                Labs studies:   I have personally reviewed pertinent labs    Results from last 7 days   Lab Units 20  1045 20  0547 20  0558 20  0445   POTASSIUM mmol/L 3 3* 3 3* 3 1* 4 3   CHLORIDE mmol/L 101 108 113* 111*   CO2 mmol/L 27 25 24 19*   BUN mg/dL 25 21 25 27*   CREATININE mg/dL 1 20 1 12 1 18 1 28   EGFR ml/min/1 73sq m 52 56 53 48   CALCIUM mg/dL 8 5 8 6 8 8 8 5   AST U/L  --  22 27 36   ALT U/L  --  11* 12 13   ALK PHOS U/L  --  71 70 67     Results from last 7 days   Lab Units 20  1045 20  0547 20  0558   WBC Thousand/uL 8 19 11 73* 13 78*   HEMOGLOBIN g/dL 10 2* 9 3* 10 0*   PLATELETS Thousands/uL 248 227 239 Imaging Studies:   I have personally reviewed pertinent imaging study reports and images in PACS  EKG, Pathology, and Other Studies:   I have personally reviewed pertinent reports

## 2020-04-21 NOTE — ASSESSMENT & PLAN NOTE
Present admission with history of hypertension  Home medication includes Diovan  Hold Diovan in the settings of suspected acute kidney injury  Will start on Cozaar+ HCTZ now since JULEE resolved and BP uncontrolled  Monitor vitals,  IV Lopressor p r n

## 2020-04-21 NOTE — ASSESSMENT & PLAN NOTE
Suspected acute kidney injury with creatinine 1 72 present admission  Patient lives in [place] and no previous labs or records available to review  Creatinine improving    1 12 today

## 2020-04-21 NOTE — SOCIAL WORK
LOS 7  Per SLIM patient plans to discharge patient tomorrow pend tolerating diet  CM spoke to daughter-alejandro who confirmed she will transport patient home  Treatment team informed

## 2020-04-21 NOTE — ASSESSMENT & PLAN NOTE
Malnutrition Findings:   Malnutrition type: Acute illness  Degree of Malnutrition: Other severe protein calorie malnutrition(related to inadequate energy intake as evidenced by <50% energy intake compared to estimated energy needs for > 5 days, visible clavicle, wasted buccal pads  Intervention Puree NTL diet, Magic cup with meals  )    BMI Findings: Body mass index is 20 28 kg/m²

## 2020-04-21 NOTE — ASSESSMENT & PLAN NOTE
Infectious disease on board  Continue isolation  Patient completed hydroxychloroquine erythromycin treatment without significant improvement but improved significantly after starting on Solu-Medrol  Follow inflammatory markers  CRP slowly trending down, 135 today  Currently O2 saturation 94-97% on room air  Discussed with infectious disease  Recommended to start on IV Solu-Medrol  Currently on Solu-Medrol 40 mg daily  Plan is to transition to oral prednisone 50 mg daily

## 2020-04-21 NOTE — PROGRESS NOTES
Long tooth with root attached found in patient's bed  Patient denying remembering it coming out  Patient noted to have small hole in gum on upper R side of mouth  No bleeding or swelling noted     Kaz Ruiz RN  6:37 AM  04/21/20  ================================================

## 2020-04-21 NOTE — PLAN OF CARE
Problem: Prexisting or High Potential for Compromised Skin Integrity  Goal: Skin integrity is maintained or improved  Description  INTERVENTIONS:  - Identify patients at risk for skin breakdown  - Assess and monitor skin integrity  - Assess and monitor nutrition and hydration status  - Monitor labs   - Assess for incontinence   - Turn and reposition patient  - Assist with mobility/ambulation  - Relieve pressure over bony prominences  - Avoid friction and shearing  - Provide appropriate hygiene as needed including keeping skin clean and dry  - Evaluate need for skin moisturizer/barrier cream  - Collaborate with interdisciplinary team   - Patient/family teaching  - Consider wound care consult   Outcome: Progressing     Problem: PAIN - ADULT  Goal: Verbalizes/displays adequate comfort level or baseline comfort level  Description  Interventions:  - Encourage patient to monitor pain and request assistance  - Assess pain using appropriate pain scale  - Administer analgesics based on type and severity of pain and evaluate response  - Implement non-pharmacological measures as appropriate and evaluate response  - Consider cultural and social influences on pain and pain management  - Notify physician/advanced practitioner if interventions unsuccessful or patient reports new pain  Outcome: Progressing     Problem: INFECTION - ADULT  Goal: Absence or prevention of progression during hospitalization  Description  INTERVENTIONS:  - Assess and monitor for signs and symptoms of infection  - Monitor lab/diagnostic results  - Monitor all insertion sites, i e  indwelling lines, tubes, and drains  - Monitor endotracheal if appropriate and nasal secretions for changes in amount and color  - [place] appropriate cooling/warming therapies per order  - Administer medications as ordered  - Instruct and encourage patient and family to use good hand hygiene technique  - Identify and instruct in appropriate isolation precautions for identified infection/condition  Outcome: Progressing  Goal: Absence of fever/infection during neutropenic period  Description  INTERVENTIONS:  - Monitor WBC    Outcome: Progressing     Problem: SAFETY ADULT  Goal: Patient will remain free of falls  Description  INTERVENTIONS:  - Assess patient frequently for physical needs  -  Identify cognitive and physical deficits and behaviors that affect risk of falls    -  [place] fall precautions as indicated by assessment   - Educate patient/family on patient safety including physical limitations  - Instruct patient to call for assistance with activity based on assessment  - Modify environment to reduce risk of injury  - Consider OT/PT consult to assist with strengthening/mobility  Outcome: Progressing  Goal: Maintain or return to baseline ADL function  Description  INTERVENTIONS:  -  Assess patient's ability to carry out ADLs; assess patient's baseline for ADL function and identify physical deficits which impact ability to perform ADLs (bathing, care of mouth/teeth, toileting, grooming, dressing, etc )  - Assess/evaluate cause of self-care deficits   - Assess range of motion  - Assess patient's mobility; develop plan if impaired  - Assess patient's need for assistive devices and provide as appropriate  - Encourage maximum independence but intervene and supervise when necessary  - Involve family in performance of ADLs  - Assess for home care needs following discharge   - Consider OT consult to assist with ADL evaluation and planning for discharge  - Provide patient education as appropriate  Outcome: Progressing  Goal: Maintain or return mobility status to optimal level  Description  INTERVENTIONS:  - Assess patient's baseline mobility status (ambulation, transfers, stairs, etc )    - Identify cognitive and physical deficits and behaviors that affect mobility  - Identify mobility aids required to assist with transfers and/or ambulation (gait belt, sit-to-stand, lift, walker, cane, etc )  - [place] fall precautions as indicated by assessment  - Record patient progress and toleration of activity level on Mobility SBAR; progress patient to next Phase/Stage  - Instruct patient to call for assistance with activity based on assessment  - Consider rehabilitation consult to assist with strengthening/weightbearing, etc   Outcome: Progressing     Problem: DISCHARGE PLANNING  Goal: Discharge to home or other facility with appropriate resources  Description  INTERVENTIONS:  - Identify barriers to discharge w/patient and caregiver  - Arrange for needed discharge resources and transportation as appropriate  - Identify discharge learning needs (meds, wound care, etc )  - Arrange for interpretive services to assist at discharge as needed  - Refer to Case Management Department for coordinating discharge planning if the patient needs post-hospital services based on physician/advanced practitioner order or complex needs related to functional status, cognitive ability, or social support system  Outcome: Progressing     Problem: Knowledge Deficit  Goal: Patient/family/caregiver demonstrates understanding of disease process, treatment plan, medications, and discharge instructions  Description  Complete learning assessment and assess knowledge base  Interventions:  - Provide teaching at level of understanding  - Provide teaching via preferred learning methods  Outcome: Progressing     Problem: Nutrition/Hydration-ADULT  Goal: Nutrient/Hydration intake appropriate for improving, restoring or maintaining nutritional needs  Description  Monitor and assess patient's nutrition/hydration status for malnutrition  Collaborate with interdisciplinary team and initiate plan and interventions as ordered  Monitor patient's weight and dietary intake as ordered or per policy  Utilize nutrition screening tool and intervene as necessary   Determine patient's food preferences and provide high-protein, high-caloric foods as appropriate  INTERVENTIONS:  - Monitor oral intake, urinary output, labs, and treatment plans  - Assess nutrition and hydration status and recommend course of action  - Evaluate amount of meals eaten  - Assist patient with eating if necessary   - Allow adequate time for meals  - Recommend/ encourage appropriate diets, oral nutritional supplements, and vitamin/mineral supplements  - Order, calculate, and assess calorie counts as needed  - Recommend, monitor, and adjust tube feedings based on assessed needs  - Assess need for intravenous fluids  - Provide nutrition/hydration education as appropriate  - Include patient/family/caregiver in decisions related to nutrition   Outcome: Progressing     Problem: Potential for Falls  Goal: Patient will remain free of falls  Description  INTERVENTIONS:  - Assess patient frequently for physical needs  -  Identify cognitive and physical deficits and behaviors that affect risk of falls    -  [place] fall precautions as indicated by assessment   - Educate patient/family on patient safety including physical limitations  - Instruct patient to call for assistance with activity based on assessment  - Modify environment to reduce risk of injury  - Consider OT/PT consult to assist with strengthening/mobility  Outcome: Progressing

## 2020-04-21 NOTE — PLAN OF CARE
Problem: Prexisting or High Potential for Compromised Skin Integrity  Goal: Skin integrity is maintained or improved  Description  INTERVENTIONS:  - Identify patients at risk for skin breakdown  - Assess and monitor skin integrity  - Assess and monitor nutrition and hydration status  - Monitor labs   - Assess for incontinence   - Turn and reposition patient  - Assist with mobility/ambulation  - Relieve pressure over bony prominences  - Avoid friction and shearing  - Provide appropriate hygiene as needed including keeping skin clean and dry  - Evaluate need for skin moisturizer/barrier cream  - Collaborate with interdisciplinary team   - Patient/family teaching  - Consider wound care consult   Outcome: Progressing     Problem: PAIN - ADULT  Goal: Verbalizes/displays adequate comfort level or baseline comfort level  Description  Interventions:  - Encourage patient to monitor pain and request assistance  - Assess pain using appropriate pain scale  - Administer analgesics based on type and severity of pain and evaluate response  - Implement non-pharmacological measures as appropriate and evaluate response  - Consider cultural and social influences on pain and pain management  - Notify physician/advanced practitioner if interventions unsuccessful or patient reports new pain  Outcome: Progressing     Problem: INFECTION - ADULT  Goal: Absence or prevention of progression during hospitalization  Description  INTERVENTIONS:  - Assess and monitor for signs and symptoms of infection  - Monitor lab/diagnostic results  - Monitor all insertion sites, i e  indwelling lines, tubes, and drains  - Monitor endotracheal if appropriate and nasal secretions for changes in amount and color  - [place] appropriate cooling/warming therapies per order  - Administer medications as ordered  - Instruct and encourage patient and family to use good hand hygiene technique  - Identify and instruct in appropriate isolation precautions for identified infection/condition  Outcome: Progressing  Goal: Absence of fever/infection during neutropenic period  Description  INTERVENTIONS:  - Monitor WBC    Outcome: Progressing     Problem: SAFETY ADULT  Goal: Patient will remain free of falls  Description  INTERVENTIONS:  - Assess patient frequently for physical needs  -  Identify cognitive and physical deficits and behaviors that affect risk of falls    -  [place] fall precautions as indicated by assessment   - Educate patient/family on patient safety including physical limitations  - Instruct patient to call for assistance with activity based on assessment  - Modify environment to reduce risk of injury  - Consider OT/PT consult to assist with strengthening/mobility  Outcome: Progressing  Goal: Maintain or return to baseline ADL function  Description  INTERVENTIONS:  -  Assess patient's ability to carry out ADLs; assess patient's baseline for ADL function and identify physical deficits which impact ability to perform ADLs (bathing, care of mouth/teeth, toileting, grooming, dressing, etc )  - Assess/evaluate cause of self-care deficits   - Assess range of motion  - Assess patient's mobility; develop plan if impaired  - Assess patient's need for assistive devices and provide as appropriate  - Encourage maximum independence but intervene and supervise when necessary  - Involve family in performance of ADLs  - Assess for home care needs following discharge   - Consider OT consult to assist with ADL evaluation and planning for discharge  - Provide patient education as appropriate  Outcome: Progressing  Goal: Maintain or return mobility status to optimal level  Description  INTERVENTIONS:  - Assess patient's baseline mobility status (ambulation, transfers, stairs, etc )    - Identify cognitive and physical deficits and behaviors that affect mobility  - Identify mobility aids required to assist with transfers and/or ambulation (gait belt, sit-to-stand, lift, walker, cane, etc )  - [place] fall precautions as indicated by assessment  - Record patient progress and toleration of activity level on Mobility SBAR; progress patient to next Phase/Stage  - Instruct patient to call for assistance with activity based on assessment  - Consider rehabilitation consult to assist with strengthening/weightbearing, etc   Outcome: Progressing     Problem: DISCHARGE PLANNING  Goal: Discharge to home or other facility with appropriate resources  Description  INTERVENTIONS:  - Identify barriers to discharge w/patient and caregiver  - Arrange for needed discharge resources and transportation as appropriate  - Identify discharge learning needs (meds, wound care, etc )  - Arrange for interpretive services to assist at discharge as needed  - Refer to Case Management Department for coordinating discharge planning if the patient needs post-hospital services based on physician/advanced practitioner order or complex needs related to functional status, cognitive ability, or social support system  Outcome: Progressing     Problem: Knowledge Deficit  Goal: Patient/family/caregiver demonstrates understanding of disease process, treatment plan, medications, and discharge instructions  Description  Complete learning assessment and assess knowledge base  Interventions:  - Provide teaching at level of understanding  - Provide teaching via preferred learning methods  Outcome: Progressing     Problem: Nutrition/Hydration-ADULT  Goal: Nutrient/Hydration intake appropriate for improving, restoring or maintaining nutritional needs  Description  Monitor and assess patient's nutrition/hydration status for malnutrition  Collaborate with interdisciplinary team and initiate plan and interventions as ordered  Monitor patient's weight and dietary intake as ordered or per policy  Utilize nutrition screening tool and intervene as necessary   Determine patient's food preferences and provide high-protein, high-caloric foods as appropriate  INTERVENTIONS:  - Monitor oral intake, urinary output, labs, and treatment plans  - Assess nutrition and hydration status and recommend course of action  - Evaluate amount of meals eaten  - Assist patient with eating if necessary   - Allow adequate time for meals  - Recommend/ encourage appropriate diets, oral nutritional supplements, and vitamin/mineral supplements  - Order, calculate, and assess calorie counts as needed  - Recommend, monitor, and adjust tube feedings based on assessed needs  - Assess need for intravenous fluids  - Provide nutrition/hydration education as appropriate  - Include patient/family/caregiver in decisions related to nutrition   Outcome: Progressing     Problem: Potential for Falls  Goal: Patient will remain free of falls  Description  INTERVENTIONS:  - Assess patient frequently for physical needs  -  Identify cognitive and physical deficits and behaviors that affect risk of falls    -  [place] fall precautions as indicated by assessment   - Educate patient/family on patient safety including physical limitations  - Instruct patient to call for assistance with activity based on assessment  - Modify environment to reduce risk of injury  - Consider OT/PT consult to assist with strengthening/mobility  Outcome: Progressing

## 2020-04-21 NOTE — PROGRESS NOTES
Pt assisted to bathroom x3 with walker, pt have no incontinence, used the toilet and ambulated back x3 with walker  Pt talking but making no sense

## 2020-04-21 NOTE — ASSESSMENT & PLAN NOTE
Sodium 150 initially  With D5W improved 147  Currently 138     Her oral intake gradually improving now

## 2020-04-21 NOTE — PROGRESS NOTES
Progress Note - Randy Cisse 1945, 69 y o  female MRN: [id number]    Unit/Bed#: -01 Encounter: [id number]    Primary Care Provider: No primary care provider on file  Date and time admitted to hospital: 4/14/2020 10:12 AM        * Fever  Assessment & Plan  64-year-old female past medical history of diabetes, hypertension, lives in [place] but has been living with daughter for about a month in [place]  Patient is currently poor historian, poor insight  History obtained from daughter over the phone  Daughter stated that patient has not been eating adequately with poor p o  Intake lately and has been complaining of having tooth pain for quite some time  On presentation she is noted to have high-grade fevers  Likely secondary to COVID- 19  Patient did not improve with hydroxychloroquine/azithromycin but improved significantly with steroids  Currently afebrile, hemodynamically stable  Long tooth found in pt's bed per nurse's note  UA negative  Chest x-ray without any acute finding  CT head negative for acute finding, 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  Tooth abscess  Assessment & Plan  Patient has right maxillary tooth abscess  This morning a fallen tooth found in her bed per nursing  Patient was on IV Unasyn a on Augmentin  Plan is to continue Augmentin for total 14 day course  Currently afebrile, hemodynamically stable  Follow-up with OMFS outpatient      Severe protein-calorie malnutrition ([facility] [address] )  Assessment & Plan  Malnutrition Findings:   Malnutrition type: Acute illness  Degree of Malnutrition: Other severe protein calorie malnutrition(related to inadequate energy intake as evidenced by <50% energy intake compared to estimated energy needs for > 5 days, visible clavicle, wasted buccal pads  Intervention Puree NTL diet, Magic cup with meals  )    BMI Findings: Body mass index is 20 28 kg/m²  Hypernatremia  Assessment & Plan  Sodium 150 initially  With D5W improved 147  Currently 138  Her oral intake gradually improving now    COVID-19 virus detected  Assessment & Plan  Infectious disease on board  Continue isolation  Patient completed hydroxychloroquine erythromycin treatment without significant improvement but improved significantly after starting on Solu-Medrol  Follow inflammatory markers  CRP slowly trending down, 135 today  Currently O2 saturation 94-97% on room air  Discussed with infectious disease  Recommended to start on IV Solu-Medrol  Currently on Solu-Medrol 40 mg daily  Plan is to transition to oral prednisone 50 mg daily  AMS (altered mental status)  Assessment & Plan  Metabolic encephalopathy, POA, likely due to dental abscesses and COVID 19 infection  Mental status improving  CT head negative on admission  Neurology team on board also  Repeat CT head did not show any acute changes also  Yesterday her diet changed to pureed diet  Mental status at her baseline now    Abnormal finding on CT scan  Assessment & Plan  CT head report noted for incidentally noted 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 per family  Daughter Chayito Barcenas states that patient had a fall in [facility] [address] approximately 4 years ago and having right eye injury and patient was seen by ophthalmologist but she never followed up for surgery per daughter  Currently she is almost plan right eye per daughter  Outpatient follow-up  JULEE (acute kidney injury) ([facility] [address] )  Assessment & Plan  Suspected acute kidney injury with creatinine 1 72 present admission    Patient lives in [place] and no previous labs or records available to review  Creatinine improving  1 12 today    HTN (hypertension)  Assessment & Plan  Present admission with history of hypertension  Home medication includes Diovan  Hold Diovan in the settings of suspected acute kidney injury  Will start on Cozaar+ HCTZ now since JULEE resolved and BP uncontrolled  Monitor vitals,  IV Lopressor p r n  DM (diabetes mellitus) [facility])  Assessment & Plan  Lab Results   Component Value Date    HGBA1C 8 3 (H) 04/15/2020       Recent Labs     04/20/20  1522 04/20/20  2044 04/21/20  0609 04/21/20  0904   POCGLU 173* 258* 282* 289*       Blood Sugar Average: Last 72 hrs:  Hemoglobin A1c 8 3  Present on admission with history of diabetes  Hold metformin while inpatient  Blood sugar high today because of steroid and IV fluid D5W  Increase Lantus to 20 units q h s     Monitor closely  Sliding scale coverage    VTE Pharmacologic Prophylaxis:   Pharmacologic: Enoxaparin (Lovenox)    Patient Centered Rounds: I have performed bedside rounds with nursing staff today  Discussions with Specialists or Other Care Team Provider: ID    Education and Discussions with Family / Patient:  Spoke with daughter Chayito Barcenas over the phone at length  Time Spent for Care: 30 minutes  More than 50% of total time spent on counseling and coordination of care as described above  Current Length of Stay: 7 day(s)    Current Patient Status: Inpatient   Certification Statement: The patient will continue to require additional inpatient hospital stay due to tapering steroids and safe dc planning in progress  Discharge Plan: TBD    Code Status: Level 1 - Full Code      Subjective:   Examined patient via video/visual observation and with Nurse's assistance and remoting in due to COVID 19 Precaution  Currently patient is afebrile  Seen sitting in a chair, not in distress  Appears comfortable  O2 saturation 97% on room air  Mentation improved  P o  Intake has been from slowly improving    Patient was on IV Solu-Medrol plan is to tapered to oral prednisone   No other events noted  Objective:     Vitals:   Temp (24hrs), Av 1 °F (36 7 °C), Min:97 2 °F (36 2 °C), Max:99 2 °F (37 3 °C)    Temp:  [97 2 °F (36 2 °C)-99 2 °F (37 3 °C)] 97 2 °F (36 2 °C)  HR:  [59-65] 64  Resp:  [17-18] 18  BP: (133-162)/(68-92) 151/88  SpO2:  [93 %-97 %] 97 %  Body mass index is 20 28 kg/m²  Input and Output Summary (last 24 hours): Intake/Output Summary (Last 24 hours) at 2020 1143  Last data filed at 2020 0900  Gross per 24 hour   Intake 130 ml   Output --   Net 130 ml       Physical Exam:     Physical Exam   Constitutional: No distress  Examined patient via video/visual observation and with Nurse's assistance and remoting in due to COVID 19 Precaution  Elderly, frail, deconditioned female patient, chronically ill-appearing, acutely nontoxic appearing  HENT:   Head: Normocephalic and atraumatic  Eyes: Conjunctivae are normal    Neck: Normal range of motion  No JVD present  Cardiovascular: Normal rate and regular rhythm  Pulmonary/Chest: Effort normal and breath sounds normal  No respiratory distress  Abdominal: Soft  Bowel sounds are normal  She exhibits no distension  Musculoskeletal: Normal range of motion  Neurological:   She is awake, alert, confused but overall mentation is improved significantly  Skin: She is not diaphoretic  Additional Data:     Labs:    Results from last 7 days   Lab Units 20  1045  20  0558   WBC Thousand/uL 8 19   < > 13 78*   HEMOGLOBIN g/dL 10 2*   < > 10 0*   HEMATOCRIT % 33 4*   < > 31 9*   PLATELETS Thousands/uL 248   < > 239   BANDS PCT %  --   --  12*   LYMPHO PCT %  --   --  9*   MONO PCT %  --   --  5   EOS PCT %  --   --  0    < > = values in this interval not displayed       Results from last 7 days   Lab Units 20  1045 20  0547   SODIUM mmol/L 138 144   POTASSIUM mmol/L 3 3* 3 3*   CHLORIDE mmol/L 101 108   CO2 mmol/L 27 25 BUN mg/dL 25 21   CREATININE mg/dL 1 20 1 12   ANION GAP mmol/L 10 11   CALCIUM mg/dL 8 5 8 6   ALBUMIN g/dL  --  2 0*   TOTAL BILIRUBIN mg/dL  --  0 40   ALK PHOS U/L  --  71   ALT U/L  --  11*   AST U/L  --  22   GLUCOSE RANDOM mg/dL 276* 145*         Results from last 7 days   Lab Units 04/21/20  1101 04/21/20  0904 04/21/20  0609 04/20/20  2044 04/20/20  1522 04/20/20  1110 04/20/20  0548 04/19/20  2048 04/19/20  1623 04/19/20  1310 04/19/20  0922 04/19/20  0556   POC GLUCOSE mg/dl 294* 289* 282* 258* 173* 140 148* 274* 226* 206* 242* 203*     Results from last 7 days   Lab Units 04/15/20  0544   HEMOGLOBIN A1C % 8 3*     Results from last 7 days   Lab Units 04/16/20  0515 04/14/20  2245   PROCALCITONIN ng/ml 2 67* 0 46*           * I Have Reviewed All Lab Data Listed Above  * Additional Pertinent Lab Tests Reviewed:  All Labs Within Last 24 Hours Reviewed      Recent Cultures (last 7 days):           Last 24 Hours Medication List:     Current Facility-Administered Medications:  acetaminophen 650 mg Rectal Q4H PRN Sunil Smith MD    acetaminophen 650 mg Oral TID Sunil Smith MD    amoxicillin-clavulanate 1 tablet Oral Q12H Aravind Lobato MD    ascorbic acid 1,000 mg Oral BID Murphy DUNHAM MD    azithromycin 500 mg Oral Once Murphy DUNHAM MD    dextrose 5 % with KCl 20 mEq/L 50 mL/hr Intravenous Continuous Sunil Smith MD Last Rate: 50 mL/hr (04/20/20 0909)   enoxaparin 40 mg Subcutaneous Q24H [address] Murphy DUNHAM MD    hydrALAZINE 5 mg Intravenous Q6H PRN Sunil Smith MD    hydrochlorothiazide 12 5 mg Oral Daily Murphy DUNHAM MD    insulin glargine 20 Units Subcutaneous HS Murphy DUNHAM MD    insulin lispro 1-5 Units Subcutaneous 4 times day Tish Hull MD    insulin lispro 1-5 Units Subcutaneous HS Tish Hull MD    Lidocaine Viscous HCl 15 mL Swish & Spit 4x Daily PRN Sunil Smith MD    losartan 12 5 mg Oral Daily Murphy DUNHAM MD    potassium chloride 20 mEq Oral Once Joss Hodges MD    predniSONE 50 mg Oral Daily Murphy DUNHAM MD    traMADol 50 mg Oral Q8H PRN Joss Hodges MD    zinc sulfate 220 mg Oral Daily Francisco Carcamo MD         Today, Patient Was Seen By: Ant Fung MD    ** Please Note: Dictation voice to text software may have been used in the creation of this document   **

## 2020-04-21 NOTE — ASSESSMENT & PLAN NOTE
CT head report noted for incidentally noted 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 per family  Daughter Neptali Kaur states that patient had a fall in [facility] [address] approximately 4 years ago and having right eye injury and patient was seen by ophthalmologist but she never followed up for surgery per daughter  Currently she is almost plan right eye per daughter  Outpatient follow-up

## 2020-04-21 NOTE — ASSESSMENT & PLAN NOTE
Lab Results   Component Value Date    HGBA1C 8 3 (H) 04/15/2020       Recent Labs     04/20/20  1522 04/20/20  2044 04/21/20  0609 04/21/20  0904   POCGLU 173* 258* 282* 289*       Blood Sugar Average: Last 72 hrs:  Hemoglobin A1c 8 3  Present on admission with history of diabetes  Hold metformin while inpatient  Blood sugar high today because of steroid and IV fluid D5W  Increase Lantus to 20 units q h s     Monitor closely    Sliding scale coverage

## 2020-04-21 NOTE — ASSESSMENT & PLAN NOTE
23-year-old female past medical history of diabetes, hypertension, lives in [place] but has been living with daughter for about a month in [place]  Patient is currently poor historian, poor insight  History obtained from daughter over the phone  Daughter stated that patient has not been eating adequately with poor p o  Intake lately and has been complaining of having tooth pain for quite some time  On presentation she is noted to have high-grade fevers  Likely secondary to COVID- 19  Patient did not improve with hydroxychloroquine/azithromycin but improved significantly with steroids  Currently afebrile, hemodynamically stable  Long tooth found in pt's bed per nurse's note  UA negative  Chest x-ray without any acute finding  CT head negative for acute finding, 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

## 2020-04-22 VITALS
WEIGHT: 110.89 LBS | BODY MASS INDEX: 20.41 KG/M2 | RESPIRATION RATE: 18 BRPM | HEIGHT: 62 IN | HEART RATE: 56 BPM | DIASTOLIC BLOOD PRESSURE: 66 MMHG | TEMPERATURE: 97.7 F | SYSTOLIC BLOOD PRESSURE: 134 MMHG | OXYGEN SATURATION: 95 %

## 2020-04-22 PROBLEM — J12.9 VIRAL PNEUMONIA: Status: ACTIVE | Noted: 2020-04-14

## 2020-04-22 LAB
ANION GAP SERPL CALCULATED.3IONS-SCNC: 7 MMOL/L (ref 4–13)
BUN SERPL-MCNC: 22 MG/DL (ref 5–25)
CALCIUM SERPL-MCNC: 8.3 MG/DL (ref 8.3–10.1)
CHLORIDE SERPL-SCNC: 99 MMOL/L (ref 100–108)
CO2 SERPL-SCNC: 27 MMOL/L (ref 21–32)
CREAT SERPL-MCNC: 1.03 MG/DL (ref 0.6–1.3)
GFR SERPL CREATININE-BSD FRML MDRD: 62 ML/MIN/1.73SQ M
GLUCOSE SERPL-MCNC: 175 MG/DL (ref 65–140)
GLUCOSE SERPL-MCNC: 196 MG/DL (ref 65–140)
GLUCOSE SERPL-MCNC: 234 MG/DL (ref 65–140)
GLUCOSE SERPL-MCNC: 259 MG/DL (ref 65–140)
POTASSIUM SERPL-SCNC: 3.7 MMOL/L (ref 3.5–5.3)
SODIUM SERPL-SCNC: 133 MMOL/L (ref 136–145)

## 2020-04-22 PROCEDURE — 80048 BASIC METABOLIC PNL TOTAL CA: CPT | Performed by: STUDENT IN AN ORGANIZED HEALTH CARE EDUCATION/TRAINING PROGRAM

## 2020-04-22 PROCEDURE — 82948 REAGENT STRIP/BLOOD GLUCOSE: CPT

## 2020-04-22 PROCEDURE — 99232 SBSQ HOSP IP/OBS MODERATE 35: CPT | Performed by: INTERNAL MEDICINE

## 2020-04-22 PROCEDURE — 99239 HOSP IP/OBS DSCHRG MGMT >30: CPT | Performed by: STUDENT IN AN ORGANIZED HEALTH CARE EDUCATION/TRAINING PROGRAM

## 2020-04-22 RX ORDER — SIMVASTATIN 20 MG
20 TABLET ORAL
COMMUNITY

## 2020-04-22 RX ORDER — AMOXICILLIN AND CLAVULANATE POTASSIUM 875; 125 MG/1; MG/1
1 TABLET, FILM COATED ORAL EVERY 12 HOURS SCHEDULED
Qty: 14 TABLET | Refills: 0 | Status: SHIPPED | OUTPATIENT
Start: 2020-04-22 | End: 2020-04-29

## 2020-04-22 RX ORDER — HYDROCHLOROTHIAZIDE 12.5 MG/1
12.5 TABLET ORAL DAILY
Qty: 30 TABLET | Refills: 0 | Status: CANCELLED | OUTPATIENT
Start: 2020-04-23

## 2020-04-22 RX ORDER — LOSARTAN POTASSIUM 25 MG/1
12.5 TABLET ORAL DAILY
Qty: 30 TABLET | Refills: 0 | Status: CANCELLED | OUTPATIENT
Start: 2020-04-23

## 2020-04-22 RX ORDER — VALSARTAN AND HYDROCHLOROTHIAZIDE 160; 12.5 MG/1; MG/1
1 TABLET, FILM COATED ORAL DAILY
COMMUNITY

## 2020-04-22 RX ORDER — PREDNISONE 10 MG/1
TABLET ORAL
Qty: 40 TABLET | Refills: 0 | Status: SHIPPED | OUTPATIENT
Start: 2020-04-23 | End: 2020-05-07

## 2020-04-22 RX ADMIN — ZINC SULFATE 220 MG (50 MG) CAPSULE 220 MG: CAPSULE at 09:25

## 2020-04-22 RX ADMIN — LOSARTAN POTASSIUM 12.5 MG: 25 TABLET, FILM COATED ORAL at 09:26

## 2020-04-22 RX ADMIN — ACETAMINOPHEN 650 MG: 325 TABLET ORAL at 09:25

## 2020-04-22 RX ADMIN — OXYCODONE HYDROCHLORIDE AND ACETAMINOPHEN 1000 MG: 500 TABLET ORAL at 09:25

## 2020-04-22 RX ADMIN — PREDNISONE 50 MG: 20 TABLET ORAL at 09:25

## 2020-04-22 RX ADMIN — DEXTROSE AND POTASSIUM CHLORIDE 50 ML/HR: 5; .15 SOLUTION INTRAVENOUS at 09:27

## 2020-04-22 RX ADMIN — HYDROCHLOROTHIAZIDE 12.5 MG: 12.5 TABLET ORAL at 09:25

## 2020-04-22 RX ADMIN — AMOXICILLIN AND CLAVULANATE POTASSIUM 1 TABLET: 875; 125 TABLET, FILM COATED ORAL at 09:27

## 2020-04-22 NOTE — SOCIAL WORK
LOS 8  Per SLIM patient will be discharged today  CM phoned [facility] to inquire about PCP appt  [facility] patient has a PCP in [address] [place] [address] whom they have been updating about patient however patient will not be going back to [place] until the family the COVID-19 pandemic has subsided  In the meantime [facility] would like patient to be scheduled with PCP in [facility] patient had a scheduled appt with [facility] for April 23 and the office called to cancel the appt     CM phoned [facility] and rescheduled patient's virtual PCP appt for Monday, April 27th at 12:45PM   [facility] is informed patient will be discharged home today  [facility] is informed of IMM and IMM is in the chart  Treatment team informed

## 2020-04-22 NOTE — ASSESSMENT & PLAN NOTE
Malnutrition Findings:   Malnutrition type: Acute illness  Degree of Malnutrition: Other severe protein calorie malnutrition(related to inadequate energy intake as evidenced by <50% energy intake compared to estimated energy needs for > 5 days, visible clavicle, wasted buccal pads  Intervention Puree NTL diet, Magic cup with meals  )    BMI Findings: Body mass index is 20 28 kg/m²  Patient with severe protein calorie malnutrition in the settings of acute illness related to inadequate energy intake as evidenced by <50 % energy intake compared to estimate drainage needs for > 5 days, with above clavicle, wasted be go pads, intervention with puree, nectar thickened liquid diet, Magic cup with meals and dietitian assessment  Daughter states that patient is a picky eater and would not eat food if she does not like it despite of encouragement

## 2020-04-22 NOTE — UTILIZATION REVIEW
Notification of Discharge  This is a Notification of Discharge from our facility [address]  Please be advised that this patient has been discharge from our facility  Below you will find the admission and discharge date and time including the patients disposition  PRESENTATION DATE: 4/14/2020 10:12 AM  OBS ADMISSION DATE:   IP ADMISSION DATE: 4/14/20 2127   DISCHARGE DATE: 4/22/2020  1:05 PM  DISPOSITION: Home with [facility]   Admission Orders listed below:  Admission Orders (From admission, onward)     Ordered        04/14/20 2127  Inpatient Admission  Once         04/14/20 1714  Place in Observation  Once                   Please contact the UR Department if additional information is required to close this patient's authorization/case  [facility] Utilization Review Department  Main: [phone] x carefully listen to the prompts  All voicemails are confidential   [email or web address]  Send all requests for admission clinical reviews, approved or denied determinations and any other requests to dedicated fax number below belonging to the campus where the patient is receiving treatment   List of dedicated fax numbers:  [address] DENIALS (Administrative/Medical Necessity) [phone]   [address] (Maternity/NICU/Pediatrics) [phone]   Deneise Notice [phone]   Rafael Patel [phone]   Yaniv Thompson [phone]   [facility] [address] [place] [address] [phone]   [facility]  [phone]   [address] [place] [address], [address]  [address] [facility] [address] [facility] [phone]

## 2020-04-22 NOTE — ASSESSMENT & PLAN NOTE
Lab Results   Component Value Date    HGBA1C 8 3 (H) 04/15/2020       Recent Labs     04/21/20  2056 04/22/20  0604 04/22/20  1013 04/22/20  1118   POCGLU 270* 234* 196* 175*       Blood Sugar Average: Last 72 hrs:  Hemoglobin A1c 8 3  Present on admission with history of diabetes  Hold metformin while inpatient  Recommended to resume home medication upon discharge  Recommended close follow-up with PCP

## 2020-04-22 NOTE — ASSESSMENT & PLAN NOTE
Patient has right maxillary tooth abscess  This morning a fallen tooth found in her bed per nursing  Patient was on IV Unasyn a on Augmentin  Plan is to continue Augmentin for total 14 day course per ID  Currently afebrile, hemodynamically stable  Follow-up with OMFS outpatient in 2 weeks after discharge  Mary Templeton agreeable with above plan

## 2020-04-22 NOTE — PROGRESS NOTES
Progress Note - Infectious Disease   Bacilio Marie 69 y o  female MRN: [id number]  Unit/Bed#: -01 Encounter: [id number]      Impression/Recommendations:  1   COVID pneumonia   Patient did not improved with HCQ/azithromycin but improved dramatically with systemic corticosteroid   Temperature is down   CRP decreasing   Respiratory status improved, with patient remaining off O2 supplement for the last few days   Patient completed antibiotic course  No further antiviral    Monitor respiratory symptoms  Monitor temperature/WBC  Monitor respiratory symptoms  Continue steroid weaning      2  Right maxillary tooth abscesses   These are clearly etiology of patient's right upper jaw pain and refusal to eat   Patient is clinically improved on antibiotic   Admission blood cultures with no growth  Continue p o  Augmentin  Monitor temperature/WBC  Monitor jaw pain  Treat x 14 days total, another 7 days  Follow up with Oral surgery after discharge      3  Encephalopathy  Sonda Flax is unclear what her baseline mental status is   Regardless, patient's encephalopathy is most likely metabolic, secondary to COVID   Head CT without acute changes   There are no clinical signs of CNS infection   Neurology evaluation noted   Mental status continues to improve  Treatment plan as in above  Monitor mental status      4  JULEE, POA, likely secondary to prerenal state from decreased p o  intake   Creatinine normalizing  Antibiotic at full dose for now  Monitor creatinine      5  DM, poorly controlled, with elevated hemoglobin A1c   This is risk factor for infections above  Management per primary service      Discussed with patient  Discussed with Dr Lima from slim service earlier  Okay for discharge from ID viewpoint  I spent 30 minutes on the unit floor, of which 15 minutes were spent in counseling and coordination of care, as outlined above      Antibiotics:  Augmentin  Antibiotic # 8     Subjective:  Patient was seen earlier  She is comfortable  No dyspnea  No cough  Jaw pain improved  Temperature stays down  Objective:  Vitals:  Temp:  [95 8 °F (35 4 °C)-97 7 °F (36 5 °C)] 97 7 °F (36 5 °C)  HR:  [45-65] 56  Resp:  [18] 18  BP: (127-139)/(64-69) 134/66  SpO2:  [94 %-99 %] 95 %  Temp (24hrs), Av 1 °F (36 2 °C), Min:95 8 °F (35 4 °C), Max:97 7 °F (36 5 °C)  Current: Temperature: 97 7 °F (36 5 °C)    Physical Exam:    Parts of the exam were were done by the Medicine and nursing services and discussed with me, due to the infection control issues related to the current COVID crisis  General: Awake, alert, cooperative, no distress  Improving confusion  Lungs: Expansion symmetric, no rales, no wheezing, respirations unlabored  Heart:  Regular rate and rhythm, S1 and S2 normal, no murmur  Abdomen: Soft, nondistended, non-tender, bowel sounds active all four quadrants, no masses, no organomegaly  Extremities: No edema  No erythema/warmth  No ulcer  Nontender to palpation  Skin:  No rash  Neuro: Moves all extremities  Invasive Devices     Peripheral Intravenous Line            Peripheral IV 20 Dorsal (posterior); Left Forearm less than 1 day                Labs studies:   I have personally reviewed pertinent labs    Results from last 7 days   Lab Units 20  0605 20  1045 20  0547 20  0558 20  0445   POTASSIUM mmol/L 3 7 3 3* 3 3* 3 1* 4 3   CHLORIDE mmol/L 99* 101 108 113* 111*   CO2 mmol/L 27 27 25 24 19*   BUN mg/dL 22 25 21 25 27*   CREATININE mg/dL 1 03 1 20 1 12 1 18 1 28   EGFR ml/min/1 73sq m 62 52 56 53 48   CALCIUM mg/dL 8 3 8 5 8 6 8 8 8 5   AST U/L  --   --  22 27 36   ALT U/L  --   --  11* 12 13   ALK PHOS U/L  --   --  71 70 67     Results from last 7 days   Lab Units 20  1045 20  0547 20  0558   WBC Thousand/uL 8 19 11 73* 13 78*   HEMOGLOBIN g/dL 10 2* 9 3* 10 0*   PLATELETS Thousands/uL 248 227 239           Imaging Studies:   I have personally reviewed pertinent imaging study reports and images in PACS  EKG, Pathology, and Other Studies:   I have personally reviewed pertinent reports

## 2020-04-22 NOTE — ASSESSMENT & PLAN NOTE
Infectious disease on board  Continue isolation  Patient completed hydroxychloroquine erythromycin treatment without significant improvement but improved significantly after starting on Solu-Medrol  Follow inflammatory markers  CRP slowly trending down, 135 today  Currently O2 saturation 94-97% on room air  Currently on prednisone 50 mg daily  Plan is to taper 10 mg every 3 days  Recommended outpatient repeat chest imaging 4- 6 weeks to monitor resolution  Recommended close outpatient follow-up with PCP

## 2020-04-22 NOTE — ASSESSMENT & PLAN NOTE
Suspected acute kidney injury with creatinine 1 72 present admission  Patient lives in [place] and no previous labs or records available to review  Creatinine improving  1 03 today

## 2020-04-22 NOTE — DISCHARGE INSTRUCTIONS
[address]    Your healthcare provider and/or public health staff have evaluated you and have determined that you do not need to remain in the hospital at this time  At this time you can be isolated at home where you will be monitored by staff from your local or state health department  You should carefully follow the prevention and isolation steps below until a healthcare provider or local or state health department says that you can return to your normal activities  Stay home except to get medical care    People who are mildly ill with COVID-19 are able to isolate at home during their illness  You should restrict activities outside your home, except for getting medical care  Do not go to work, school, or public areas  Avoid using public transportation, ride-sharing, or taxis  Separate yourself from other people and animals in your home    People: As much as possible, you should stay in a specific room and away from other people in your home  Also, you should use a separate bathroom, if available  Animals: You should restrict contact with pets and other animals while you are sick with COVID-19, just like you would around other people  Although there have not been reports of pets or other animals becoming sick with COVID-19, it is still recommended that people sick with COVID-19 limit contact with animals until more information is known about the virus  When possible, have another member of your household care for your animals while you are sick  If you are sick with COVID-19, avoid contact with your pet, including petting, snuggling, being kissed or licked, and sharing food  If you must care for your pet or be around animals while you are sick, wash your hands before and after you interact with pets and wear a facemask  See COVID-19 and Animals for more information      Call ahead before visiting your doctor    If you have a medical appointment, call the healthcare provider and tell them that you have or may have COVID-19  This will help the healthcare providers office take steps to keep other people from getting infected or exposed  Wear a facemask    You should wear a facemask when you are around other people (e g , sharing a room or vehicle) or pets and before you enter a healthcare providers office  If you are not able to wear a facemask (for example, because it causes trouble breathing), then people who live with you should not stay in the same room with you, or they should wear a facemask if they enter your room  Cover your coughs and sneezes    Cover your mouth and nose with a tissue when you cough or sneeze  Throw used tissues in a lined trash can  Immediately wash your hands with soap and water for at least 20 seconds or, if soap and water are not available, clean your hands with an alcohol-based hand  that contains at least 60% alcohol  Clean your hands often    Wash your hands often with soap and water for at least 20 seconds, especially after blowing your nose, coughing, or sneezing; going to the bathroom; and before eating or preparing food  If soap and water are not readily available, use an alcohol-based hand  with at least 60% alcohol, covering all surfaces of your hands and rubbing them together until they feel dry  Soap and water are the best option if hands are visibly dirty  Avoid touching your eyes, nose, and mouth with unwashed hands  Avoid sharing personal household items    You should not share dishes, drinking glasses, cups, eating utensils, towels, or bedding with other people or pets in your home  After using these items, they should be washed thoroughly with soap and water  Clean all high-touch surfaces everyday    High touch surfaces include counters, tabletops, doorknobs, bathroom fixtures, toilets, phones, keyboards, tablets, and bedside tables  Also, clean any surfaces that may have blood, stool, or body fluids on them   Use a household cleaning spray or wipe, according to the label instructions  Labels contain instructions for safe and effective use of the cleaning product including precautions you should take when applying the product, such as wearing gloves and making sure you have good ventilation during use of the product  Monitor your symptoms    Seek prompt medical attention if your illness is worsening (e g , difficulty breathing)  Before seeking care, call your healthcare provider and tell them that you have, or are being evaluated for, COVID-19  Put on a facemask before you enter the facility  These steps will help the healthcare providers office to keep other people in the office or waiting room from getting infected or exposed  Ask your healthcare provider to call the local or [facility] health department  Persons who are placed under active monitoring or facilitated self-monitoring should follow instructions provided by their local health department or occupational health professionals, as appropriate  If you have a medical emergency and need to call 911, notify the dispatch personnel that you have, or are being evaluated for COVID-19  If possible, put on a facemask before emergency medical services arrive  Discontinuing home isolation    Patients with confirmed COVID-19 should remain under home isolation precautions until the following conditions are met:   - They have had no fever for at least 72 hours (that is three full days of no fever without the use medicine that reduces fevers)  AND  - other symptoms have improved (for example, when their cough or shortness of breath have improved)  AND  - at least 7 days have passed since their symptoms first appeared  Patients with confirmed COVID-19 should also notify close contacts (including their workplace) and ask that they self-quarantine   Currently, close contact is defined as being within 6 feet for 10 minutes or more from the period 48 hours before symptom onset to the time at which the patient went into isolation  Close contacts of patients diagnosed with COVID-19 should be instructed by the patient to self-quarantine for 14 days from the last time of their last contact with the patient  Source: [employer]    COVID-19 Convalescent Plasma Donation      You were treated for Novel Coronavirus (COVID-19)  Please schedule follow-up with your Primary Care Physician as outlined in your discharge paperwork  Once you have fully recovered from COVID-19, you may be able to help patients currently fighting the infection by donating plasma  This is the same process as donating blood  As you recover from the infection, your body is making antibodies targeting COVID-19  When a person becomes ill with COVID-19, it can take some time to develop the antibodies needed to combat the disease  In patients who become seriously ill, they may not make antibodies quickly enough to fight the disease  By collecting plasma from recovered patients and giving it to seriously ill patients, we hope we can provide a boost to the sick patients' immune systems and help stimulate recovery  This is particularly important in patients who may already have a suppressed immune system  In order to be a donor, your symptoms must be completely resolved for 14 - 28 days  To learn more about convalescent plasma donation, please visit [facility]'s website at [email or web address]    If you are willing to be a donor, please contact Pat[facility] Beltran at [facility] ([phone]) to schedule a brief telephone call  A member of our team will also reach out to you in a few weeks to answer any questions you may have related to convalescent plasma donation

## 2020-04-22 NOTE — ASSESSMENT & PLAN NOTE
81-year-old female past medical history of diabetes, hypertension, lives in [place] but has been living with daughter for about a month in [place]  Patient is currently poor historian, poor insight  History obtained from daughter over the phone  Daughter stated that patient has not been eating adequately with poor p o  Intake lately and has been complaining of having tooth pain for quite some time  On presentation she is noted to have high-grade fevers  Likely secondary to COVID- 19 pneumonia  Patient did not improve with hydroxychloroquine/azithromycin but improved significantly with steroids  O2 saturation 95-93% on room air  Currently patient resume appears stable and mentation at her baseline per family  Currently afebrile, hemodynamically stable  Long tooth found in pt's bed per nurse's note  UA negative  CT chest report noted for few airspace opacities at the base of both lobes  CT head negative for acute finding, 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  Repeat CT negative for any acute finding  Patient also has tooth infection  Evaluated by Infectious Disease and oral surgeon  Patient was on IV Unasyn and now transition to oral Augmentin  Plan is to continue oral Augmentin for total 14 day course per ID  Patient completed treatment for COVID-19  Blood cultures no growth   Continue supportive care  Currently hemodynamically stable for discharge  Will discharge on tapering dose of prednisone  Continue Augmentin for 7 more days for both abscess  Recommended repeat chest imaging in 4-6 weeks outpatient to monitor resolution  Recommended close follow-up with OMFS outpatient  I have discussed above with patient's daughter Russell Avina over the phone at length and she is agreeable with above plan

## 2020-04-22 NOTE — DISCHARGE SUMMARY
Discharge- Maria Alejandra White 1945, 69 y o  female MRN: [id number]    Unit/Bed#: -01 Encounter: [id number]    Primary Care Provider: No primary care provider on file  Date and time admitted to hospital: 4/14/2020 10:12 AM        * Viral pneumonia  Assessment & Plan  78-year-old female past medical history of diabetes, hypertension, lives in [place] but has been living with daughter for about a month in [place]  Patient is currently poor historian, poor insight  History obtained from daughter over the phone  Daughter stated that patient has not been eating adequately with poor p o  Intake lately and has been complaining of having tooth pain for quite some time  On presentation she is noted to have high-grade fevers  Likely secondary to COVID- 19 pneumonia  Patient did not improve with hydroxychloroquine/azithromycin but improved significantly with steroids  O2 saturation 95-93% on room air  Currently patient resume appears stable and mentation at her baseline per family  Currently afebrile, hemodynamically stable  Long tooth found in pt's bed per nurse's note  UA negative  CT chest report noted for few airspace opacities at the base of both lobes  CT head negative for acute finding, 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  Repeat CT negative for any acute finding  Patient also has tooth infection  Evaluated by Infectious Disease and oral surgeon  Patient was on IV Unasyn and now transition to oral Augmentin  Plan is to continue oral Augmentin for total 14 day course per ID  Patient completed treatment for COVID-19  Blood cultures no growth   Continue supportive care  Currently hemodynamically stable for discharge  Will discharge on tapering dose of prednisone  Continue Augmentin for 7 more days for both abscess  Recommended repeat chest imaging in 4-6 weeks outpatient to monitor resolution  Recommended close follow-up with OMFS outpatient    I have discussed above with patient's daughter Lauri Sanders over the phone at length and she is agreeable with above plan  Tooth abscess  Assessment & Plan  Patient has right maxillary tooth abscess  This morning a fallen tooth found in her bed per nursing  Patient was on IV Unasyn a on Augmentin  Plan is to continue Augmentin for total 14 day course per ID  Currently afebrile, hemodynamically stable  Follow-up with OMFS outpatient in 2 weeks after discharge  Lauri Sanders agreeable with above plan  Severe protein-calorie malnutrition ([address] )  Assessment & Plan  Malnutrition Findings:   Malnutrition type: Acute illness  Degree of Malnutrition: Other severe protein calorie malnutrition(related to inadequate energy intake as evidenced by <50% energy intake compared to estimated energy needs for > 5 days, visible clavicle, wasted buccal pads  Intervention Puree NTL diet, Magic cup with meals  )    BMI Findings: Body mass index is 20 28 kg/m²  Patient with severe protein calorie malnutrition in the settings of acute illness related to inadequate energy intake as evidenced by <50 % energy intake compared to estimate drainage needs for > 5 days, with above clavicle, wasted be go pads, intervention with puree, nectar thickened liquid diet, Magic cup with meals and dietitian assessment  Daughter states that patient is a picky eater and would not eat food if she does not like it despite of encouragement  COVID-19 virus detected  Assessment & Plan  Infectious disease on board  Continue isolation  Patient completed hydroxychloroquine erythromycin treatment without significant improvement but improved significantly after starting on Solu-Medrol  Follow inflammatory markers  CRP slowly trending down, 135 today  Currently O2 saturation 94-97% on room air  Currently on prednisone 50 mg daily  Plan is to taper 10 mg every 3 days  Recommended outpatient repeat chest imaging 4- 6 weeks to monitor resolution    Recommended close outpatient follow-up with PCP  AMS (altered mental status)  Assessment & Plan  Metabolic encephalopathy, POA, likely due to dental abscesses and COVID 19 infection  CT head negative for acute finding on admission  Repeat CT head did not show any acute changes also  Patient was evaluated by Neurology and no further inpatient workup recommended  AMS resolved  Currently patient is at her baseline per daughter Melissa Dominguez  Yesterday her diet changed to pureed diet  Mental status at her baseline now per family  Currently hemodynamically stable for discharge home with VNA services  Melissa Dominguez agrees with above plan  Abnormal finding on CT scan  Assessment & Plan  CT head report noted for incidentally noted 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 per family  Daughter Melissa Dominguez states that patient had a fall in [place] [address] approximately 4 years ago and having right eye injury and patient was seen by ophthalmologist but she never followed up for surgery per daughter  Currently she is almost plan right eye per daughter  Outpatient follow-up  JULEE (acute kidney injury) ([facility] [address] )  Assessment & Plan  Suspected acute kidney injury with creatinine 1 72 present admission  Patient lives in [place] and no previous labs or records available to review  Creatinine improving  1 03 today  DM (diabetes mellitus) [facility])  Assessment & Plan  Lab Results   Component Value Date    HGBA1C 8 3 (H) 04/15/2020       Recent Labs     04/21/20  2056 04/22/20  0604 04/22/20  1013 04/22/20  1118   POCGLU 270* 234* 196* 175*       Blood Sugar Average: Last 72 hrs:  Hemoglobin A1c 8 3  Present on admission with history of diabetes  Hold metformin while inpatient  Recommended to resume home medication upon discharge  Recommended close follow-up with PCP  Discharging Physician / Practitioner: Claudia Gaviria MD  PCP: No primary care provider on file    Admission Date:   Admission Orders (From admission, onward)     Ordered        04/14/20 2127  Inpatient Admission  Once         04/14/20 1714  Place in Observation  Once                   Discharge Date: 04/22/20    Resolved Problems  Date Reviewed: 4/22/2020    None          Consultations During Hospital Stay:  Neurology, Infectious Disease, oral and maxillofacial surgery      Significant Findings / Test Results:   · CT head negative for acute finding, noted microangiopathic changes, 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  · Initials x-ray report noted limited due to patient rotation, noted without any acute finding  · CT facial report noted for right maxillary canine and 2nd molar periapical abscesses  · CT chest report noted for few airspace opacities at the base of left lower lobe and less prominent the right lower lobe may represent pneumonia  · Repeat CT negative for acute finding, stable right sided retinal detachment  Outpatient Tests Requested:  · Recommended repeat chest imaging in 4-6 weeks outpatient to monitor resolution  · Recommended outpatient follow-up with overall, maxillofacial surgery in 2 weeks  Reason for Admission:  Fever, encephalopathy, Covid 19 pneumonia,    Hospital Course:     Bacilio Marie is a 69 y o  female patient past medical history of diabetes, hypertension, hyperlipidemia, dementia, patient lives in [place] but reported that she had been living with daughter in [place] for last month, presented with complaining of fever, encephalopathy, poor p o  Intake  Patient was [facility] seen at [facility] and sent to ER for further evaluation  Patient was a poor historian and history obtained from daughter over the phone  Tolerated refused patient to have any exposure or recent travel  Initial workup was not in a revealing  Subsequently patient was noted with high-grade fevers and was tested positive for COVID 19  CT head negative for any acute finding x2  CT chest was revealing for pneumonia  Upon further inquiry daughter stated that patient had gone to [place] to visit her bank approximately 2 weeks ago prior to coming to ER  Patient's symptoms are likely due to pneumonia secondary to COVID 19  Patient was seen by ID and treated appropriately and completed treatment per ID recommendation  Patient also noted to have tooth abscess and treated appropriately with IV Unasyn and currently switch to oral Augmentin per ID  Plan is to continue Augmentin for 7 more days to complete total 14 day course per ID  Plan is to follow-up with Oral and maxillofacial surgery outpatient in 2 weeks further recommendation earlier  Currently encephalopathy has resolved and patient is awake, alert, oriented at her baseline per family  She is also eating but family reports that patient is a picky eater and will not eat if she does not like the food  Recommended continue dysphagia diet per speech and swallow recommendation  Currently patient is afebrile, saturation 95% on room air  Currently she is hemodynamically stable for discharge  I recommended to have repeat chest imaging in 4-6 weeks to monitor resolution  Recommended close follow up with primary care provider, OMFS outpatient  I have discussed above with patient's daughter Myrna Horan over the phone at length and she is agreeable with above plan  No other events reported  Refer to earlier notes for further clarification  Please see above list of diagnoses and related plan for additional information  Condition at Discharge: fair     Discharge Day Visit / Exam:     Subjective:  Examined patient via video/visual observation and with Nurse's assistance and remoting in due to COVID 19 Precaution  Afebrile, hemodynamically stable  O2 saturation 95-96% on room air  Patient is awake, alert, oriented at her baseline per family  Electrolytes are within normal limits  Currently patient is hemodynamically stable for discharge  No other events reported    Refer to earlier notes for further clarification  Vitals: Blood Pressure: 134/66 (04/22/20 0554)  Pulse: 56 (04/22/20 0554)  Temperature: 97 7 °F (36 5 °C) (04/22/20 0554)  Temp Source: Axillary (04/20/20 2315)  Respirations: 18 (04/22/20 0554)  Height: 5' 2" (157 5 cm) (04/14/20 2137)  Weight - Scale: 50 3 kg (110 lb 14 3 oz) (04/14/20 2137)  SpO2: 95 % (04/22/20 0720)  Exam:   Physical Exam   Constitutional: No distress  Examined patient via video/visual observation and with Nurse's assistance and remoting in due to COVID 19 Precaution  Elderly, frail, deconditioned female patient, chronically ill-appearing, acutely nontoxic appearing  HENT:   Head: Normocephalic and atraumatic  Eyes: Conjunctivae are normal    Neck: Normal range of motion  No JVD present  Cardiovascular: Normal rate and regular rhythm  Pulmonary/Chest: Effort normal and breath sounds normal  No respiratory distress  Abdominal: Soft  Bowel sounds are normal  She exhibits no distension  Musculoskeletal: Normal range of motion  Neurological:   She is awake, alert, oriented at her baseline per daughter  Poor historian  Cooperative, redirectable at this time  Skin: She is not diaphoretic  Discussion with Family:  I spoke to patient's daughter Francis Hernandez over the phone at length and answered all questions appropriately  Discharge instructions/Information to patient and family:   See after visit summary for information provided to patient and family  Provisions for Follow-Up Care:  See after visit summary for information related to follow-up care and any pertinent home health orders  Disposition:     Home with VNA Services (Reminder: Complete face to face encounter)    For Discharges to [facility] SNF:   · Not Applicable to this Patient - Not Applicable to this Patient    Planned Readmission:  None     Discharge Statement:  I spent 40 minutes discharging the patient   This time was spent on the day of discharge  I had direct contact with the patient on the day of discharge  Greater than 50% of the total time was spent examining patient, answering all patient questions, arranging and discussing plan of care with patient as well as directly providing post-discharge instructions  Additional time then spent on discharge activities  Discharge Medications:  See after visit summary for reconciled discharge medications provided to patient and family        ** Please Note: This note has been constructed using a voice recognition system **

## 2020-04-22 NOTE — ASSESSMENT & PLAN NOTE
CT head report noted for incidentally noted 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 per family  Daughter Beto Lopez states that patient had a fall in [facility] approximately 4 years ago and having right eye injury and patient was seen by ophthalmologist but she never followed up for surgery per daughter  Currently she is almost plan right eye per daughter  Outpatient follow-up

## 2020-04-22 NOTE — ASSESSMENT & PLAN NOTE
Metabolic encephalopathy, POA, likely due to dental abscesses and COVID 19 infection  CT head negative for acute finding on admission  Repeat CT head did not show any acute changes also  Patient was evaluated by Neurology and no further inpatient workup recommended  AMS resolved  Currently patient is at her baseline per daughter Humberto Rater  Yesterday her diet changed to pureed diet  Mental status at her baseline now per family  Currently hemodynamically stable for discharge home with VNA services  Humberto Rater agrees with above plan

## 2020-04-22 NOTE — PLAN OF CARE
Problem: Prexisting or High Potential for Compromised Skin Integrity  Goal: Skin integrity is maintained or improved  Description  INTERVENTIONS:  - Identify patients at risk for skin breakdown  - Assess and monitor skin integrity  - Assess and monitor nutrition and hydration status  - Monitor labs   - Assess for incontinence   - Turn and reposition patient  - Assist with mobility/ambulation  - Relieve pressure over bony prominences  - Avoid friction and shearing  - Provide appropriate hygiene as needed including keeping skin clean and dry  - Evaluate need for skin moisturizer/barrier cream  - Collaborate with interdisciplinary team   - Patient/family teaching  - Consider wound care consult   Outcome: Progressing     Problem: PAIN - ADULT  Goal: Verbalizes/displays adequate comfort level or baseline comfort level  Description  Interventions:  - Encourage patient to monitor pain and request assistance  - Assess pain using appropriate pain scale  - Administer analgesics based on type and severity of pain and evaluate response  - Implement non-pharmacological measures as appropriate and evaluate response  - Consider cultural and social influences on pain and pain management  - Notify physician/advanced practitioner if interventions unsuccessful or patient reports new pain  Outcome: Progressing     Problem: INFECTION - ADULT  Goal: Absence or prevention of progression during hospitalization  Description  INTERVENTIONS:  - Assess and monitor for signs and symptoms of infection  - Monitor lab/diagnostic results  - Monitor all insertion sites, i e  indwelling lines, tubes, and drains  - Monitor endotracheal if appropriate and nasal secretions for changes in amount and color  - [place] appropriate cooling/warming therapies per order  - Administer medications as ordered  - Instruct and encourage patient and family to use good hand hygiene technique  - Identify and instruct in appropriate isolation precautions for identified infection/condition  Outcome: Progressing  Goal: Absence of fever/infection during neutropenic period  Description  INTERVENTIONS:  - Monitor WBC    Outcome: Progressing     Problem: SAFETY ADULT  Goal: Patient will remain free of falls  Description  INTERVENTIONS:  - Assess patient frequently for physical needs  -  Identify cognitive and physical deficits and behaviors that affect risk of falls    -  [place] fall precautions as indicated by assessment   - Educate patient/family on patient safety including physical limitations  - Instruct patient to call for assistance with activity based on assessment  - Modify environment to reduce risk of injury  - Consider OT/PT consult to assist with strengthening/mobility  Outcome: Progressing  Goal: Maintain or return to baseline ADL function  Description  INTERVENTIONS:  -  Assess patient's ability to carry out ADLs; assess patient's baseline for ADL function and identify physical deficits which impact ability to perform ADLs (bathing, care of mouth/teeth, toileting, grooming, dressing, etc )  - Assess/evaluate cause of self-care deficits   - Assess range of motion  - Assess patient's mobility; develop plan if impaired  - Assess patient's need for assistive devices and provide as appropriate  - Encourage maximum independence but intervene and supervise when necessary  - Involve family in performance of ADLs  - Assess for home care needs following discharge   - Consider OT consult to assist with ADL evaluation and planning for discharge  - Provide patient education as appropriate  Outcome: Progressing  Goal: Maintain or return mobility status to optimal level  Description  INTERVENTIONS:  - Assess patient's baseline mobility status (ambulation, transfers, stairs, etc )    - Identify cognitive and physical deficits and behaviors that affect mobility  - Identify mobility aids required to assist with transfers and/or ambulation (gait belt, sit-to-stand, lift, walker, cane, etc )  - [place] fall precautions as indicated by assessment  - Record patient progress and toleration of activity level on Mobility SBAR; progress patient to next Phase/Stage  - Instruct patient to call for assistance with activity based on assessment  - Consider rehabilitation consult to assist with strengthening/weightbearing, etc   Outcome: Progressing     Problem: DISCHARGE PLANNING  Goal: Discharge to home or other facility with appropriate resources  Description  INTERVENTIONS:  - Identify barriers to discharge w/patient and caregiver  - Arrange for needed discharge resources and transportation as appropriate  - Identify discharge learning needs (meds, wound care, etc )  - Arrange for interpretive services to assist at discharge as needed  - Refer to Case Management Department for coordinating discharge planning if the patient needs post-hospital services based on physician/advanced practitioner order or complex needs related to functional status, cognitive ability, or social support system  Outcome: Progressing     Problem: Knowledge Deficit  Goal: Patient/family/caregiver demonstrates understanding of disease process, treatment plan, medications, and discharge instructions  Description  Complete learning assessment and assess knowledge base  Interventions:  - Provide teaching at level of understanding  - Provide teaching via preferred learning methods  Outcome: Progressing     Problem: Nutrition/Hydration-ADULT  Goal: Nutrient/Hydration intake appropriate for improving, restoring or maintaining nutritional needs  Description  Monitor and assess patient's nutrition/hydration status for malnutrition  Collaborate with interdisciplinary team and initiate plan and interventions as ordered  Monitor patient's weight and dietary intake as ordered or per policy  Utilize nutrition screening tool and intervene as necessary   Determine patient's food preferences and provide high-protein, high-caloric foods as appropriate  INTERVENTIONS:  - Monitor oral intake, urinary output, labs, and treatment plans  - Assess nutrition and hydration status and recommend course of action  - Evaluate amount of meals eaten  - Assist patient with eating if necessary   - Allow adequate time for meals  - Recommend/ encourage appropriate diets, oral nutritional supplements, and vitamin/mineral supplements  - Order, calculate, and assess calorie counts as needed  - Recommend, monitor, and adjust tube feedings based on assessed needs  - Assess need for intravenous fluids  - Provide nutrition/hydration education as appropriate  - Include patient/family/caregiver in decisions related to nutrition   Outcome: Progressing     Problem: Potential for Falls  Goal: Patient will remain free of falls  Description  INTERVENTIONS:  - Assess patient frequently for physical needs  -  Identify cognitive and physical deficits and behaviors that affect risk of falls    -  [place] fall precautions as indicated by assessment   - Educate patient/family on patient safety including physical limitations  - Instruct patient to call for assistance with activity based on assessment  - Modify environment to reduce risk of injury  - Consider OT/PT consult to assist with strengthening/mobility  Outcome: Progressing

## 2020-04-22 NOTE — DISCHARGE INSTR - AVS FIRST PAGE
Close follow-up with primary care provider in 3-5 days of discharge  Recommended repeat chest imaging in 4-6 weeks outpatient to monitor resolution  Recommended to follow up with oral - maxillary surgery outpatient in 2 weeks

## 2024-02-21 PROBLEM — J12.9 VIRAL PNEUMONIA: Status: RESOLVED | Noted: 2020-04-14 | Resolved: 2024-02-21

## 2025-04-18 ENCOUNTER — APPOINTMENT (EMERGENCY)
Dept: CT IMAGING | Facility: HOSPITAL | Age: 80
End: 2025-04-18
Payer: MEDICARE

## 2025-04-18 ENCOUNTER — APPOINTMENT (EMERGENCY)
Dept: RADIOLOGY | Facility: HOSPITAL | Age: 80
End: 2025-04-18
Payer: MEDICARE

## 2025-04-18 ENCOUNTER — HOSPITAL ENCOUNTER (EMERGENCY)
Facility: HOSPITAL | Age: 80
Discharge: HOME | End: 2025-04-18
Attending: EMERGENCY MEDICINE
Payer: MEDICARE

## 2025-04-18 VITALS
OXYGEN SATURATION: 98 % | RESPIRATION RATE: 15 BRPM | DIASTOLIC BLOOD PRESSURE: 75 MMHG | TEMPERATURE: 98.6 F | SYSTOLIC BLOOD PRESSURE: 170 MMHG | HEART RATE: 72 BPM

## 2025-04-18 DIAGNOSIS — R41.3 MEMORY LOSS OR IMPAIRMENT: ICD-10-CM

## 2025-04-18 DIAGNOSIS — R41.0 CONFUSION: Primary | ICD-10-CM

## 2025-04-18 LAB
ALBUMIN SERPL BCG-MCNC: 3.8 G/DL (ref 3.5–5)
ALP SERPL-CCNC: 79 U/L (ref 34–104)
ALT SERPL W P-5'-P-CCNC: 8 U/L (ref 7–52)
ANION GAP SERPL CALCULATED.3IONS-SCNC: 6 MMOL/L (ref 4–13)
AST SERPL W P-5'-P-CCNC: 14 U/L (ref 13–39)
BACTERIA UR QL AUTO: ABNORMAL /HPF
BASOPHILS # BLD AUTO: 0.02 THOUSANDS/ÂΜL (ref 0–0.1)
BASOPHILS NFR BLD AUTO: 0 % (ref 0–1)
BILIRUB SERPL-MCNC: 0.25 MG/DL (ref 0.2–1)
BILIRUB UR QL STRIP: NEGATIVE
BUN SERPL-MCNC: 30 MG/DL (ref 5–25)
CALCIUM SERPL-MCNC: 9.2 MG/DL (ref 8.4–10.2)
CARDIAC TROPONIN I PNL SERPL HS: 6 NG/L (ref ?–50)
CHLORIDE SERPL-SCNC: 108 MMOL/L (ref 96–108)
CLARITY UR: CLEAR
CO2 SERPL-SCNC: 24 MMOL/L (ref 21–32)
COLOR UR: ABNORMAL
CREAT SERPL-MCNC: 0.97 MG/DL (ref 0.6–1.3)
EOSINOPHIL # BLD AUTO: 0.03 THOUSAND/ÂΜL (ref 0–0.61)
EOSINOPHIL NFR BLD AUTO: 1 % (ref 0–6)
ERYTHROCYTE [DISTWIDTH] IN BLOOD BY AUTOMATED COUNT: 13.8 % (ref 11.6–15.1)
GFR SERPL CREATININE-BSD FRML MDRD: 56 ML/MIN/1.73SQ M
GLUCOSE SERPL-MCNC: 123 MG/DL (ref 65–140)
GLUCOSE UR STRIP-MCNC: ABNORMAL MG/DL
HCT VFR BLD AUTO: 31.4 % (ref 34.8–46.1)
HGB BLD-MCNC: 9.9 G/DL (ref 11.5–15.4)
HGB UR QL STRIP.AUTO: NEGATIVE
IMM GRANULOCYTES # BLD AUTO: 0.01 THOUSAND/UL (ref 0–0.2)
IMM GRANULOCYTES NFR BLD AUTO: 0 % (ref 0–2)
KETONES UR STRIP-MCNC: NEGATIVE MG/DL
LEUKOCYTE ESTERASE UR QL STRIP: ABNORMAL
LYMPHOCYTES # BLD AUTO: 1.75 THOUSANDS/ÂΜL (ref 0.6–4.47)
LYMPHOCYTES NFR BLD AUTO: 39 % (ref 14–44)
MCH RBC QN AUTO: 28.6 PG (ref 26.8–34.3)
MCHC RBC AUTO-ENTMCNC: 31.5 G/DL (ref 31.4–37.4)
MCV RBC AUTO: 91 FL (ref 82–98)
MONOCYTES # BLD AUTO: 0.49 THOUSAND/ÂΜL (ref 0.17–1.22)
MONOCYTES NFR BLD AUTO: 11 % (ref 4–12)
MUCOUS THREADS UR QL AUTO: ABNORMAL
NEUTROPHILS # BLD AUTO: 2.21 THOUSANDS/ÂΜL (ref 1.85–7.62)
NEUTS SEG NFR BLD AUTO: 49 % (ref 43–75)
NITRITE UR QL STRIP: NEGATIVE
NON-SQ EPI CELLS URNS QL MICRO: ABNORMAL /HPF
NRBC BLD AUTO-RTO: 0 /100 WBCS
PH UR STRIP.AUTO: 5.5 [PH]
PLATELET # BLD AUTO: 191 THOUSANDS/UL (ref 149–390)
PMV BLD AUTO: 10.4 FL (ref 8.9–12.7)
POTASSIUM SERPL-SCNC: 4.7 MMOL/L (ref 3.5–5.3)
PROT SERPL-MCNC: 6.8 G/DL (ref 6.4–8.4)
PROT UR STRIP-MCNC: ABNORMAL MG/DL
RBC # BLD AUTO: 3.46 MILLION/UL (ref 3.81–5.12)
RBC #/AREA URNS AUTO: ABNORMAL /HPF
SODIUM SERPL-SCNC: 138 MMOL/L (ref 135–147)
SP GR UR STRIP.AUTO: 1.02 (ref 1–1.03)
UROBILINOGEN UR STRIP-ACNC: <2 MG/DL
WBC # BLD AUTO: 4.51 THOUSAND/UL (ref 4.31–10.16)
WBC #/AREA URNS AUTO: ABNORMAL /HPF

## 2025-04-18 PROCEDURE — 93005 ELECTROCARDIOGRAM TRACING: CPT

## 2025-04-18 PROCEDURE — 36415 COLL VENOUS BLD VENIPUNCTURE: CPT | Performed by: EMERGENCY MEDICINE

## 2025-04-18 PROCEDURE — 71045 X-RAY EXAM CHEST 1 VIEW: CPT

## 2025-04-18 PROCEDURE — 99285 EMERGENCY DEPT VISIT HI MDM: CPT | Performed by: EMERGENCY MEDICINE

## 2025-04-18 PROCEDURE — 99285 EMERGENCY DEPT VISIT HI MDM: CPT

## 2025-04-18 PROCEDURE — 85025 COMPLETE CBC W/AUTO DIFF WBC: CPT | Performed by: EMERGENCY MEDICINE

## 2025-04-18 PROCEDURE — 87086 URINE CULTURE/COLONY COUNT: CPT | Performed by: EMERGENCY MEDICINE

## 2025-04-18 PROCEDURE — 70450 CT HEAD/BRAIN W/O DYE: CPT

## 2025-04-18 PROCEDURE — 84484 ASSAY OF TROPONIN QUANT: CPT | Performed by: EMERGENCY MEDICINE

## 2025-04-18 PROCEDURE — 81001 URINALYSIS AUTO W/SCOPE: CPT | Performed by: EMERGENCY MEDICINE

## 2025-04-18 PROCEDURE — 80053 COMPREHEN METABOLIC PANEL: CPT | Performed by: EMERGENCY MEDICINE
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
## 2025-04-18 NOTE — DISCHARGE INSTRUCTIONS
You were seen and evaluated today for memory loss.  Your test results demonstrated chronic head CT changes demonstrating old stroke and R eye vitreous changes. You did not provide a urine sample for testing.   Referrals to neurology, care management, and family practice have been made for you.   Please take all medications as instructed. Follow up with your PCP as discussed.   RETURN TO THE EMERGENCY DEPARTMENT if you develop new or worsening symptoms and are unable to see your PCP.

## 2025-04-19 LAB
ATRIAL RATE: 61 BPM
BACTERIA UR CULT: NORMAL
P AXIS: 88 DEGREES
PR INTERVAL: 210 MS
QRS AXIS: 71 DEGREES
QRSD INTERVAL: 88 MS
QT INTERVAL: 400 MS
QTC INTERVAL: 402 MS
T WAVE AXIS: 68 DEGREES
VENTRICULAR RATE: 61 BPM

## 2025-04-19 PROCEDURE — 93010 ELECTROCARDIOGRAM REPORT: CPT | Performed by: STUDENT IN AN ORGANIZED HEALTH CARE EDUCATION/TRAINING PROGRAM

## 2025-06-27 ENCOUNTER — HOSPITAL ENCOUNTER (INPATIENT)
Facility: HOSPITAL | Age: 80
LOS: 3 days | Discharge: HOME | End: 2025-07-01
Attending: EMERGENCY MEDICINE | Admitting: INTERNAL MEDICINE
Payer: MEDICARE

## 2025-06-27 ENCOUNTER — APPOINTMENT (EMERGENCY)
Dept: RADIOLOGY | Facility: HOSPITAL | Age: 80
End: 2025-06-27
Payer: MEDICARE

## 2025-06-27 DIAGNOSIS — J18.9 PNEUMONIA: Primary | ICD-10-CM

## 2025-06-27 DIAGNOSIS — E11.9 TYPE 2 DIABETES MELLITUS WITHOUT COMPLICATION, WITHOUT LONG-TERM CURRENT USE OF INSULIN (HCC): ICD-10-CM

## 2025-06-27 PROBLEM — A41.9 SEPSIS (HCC): Status: ACTIVE | Noted: 2025-06-27

## 2025-06-27 LAB
2HR DELTA HS TROPONIN: 13 NG/L
4HR DELTA HS TROPONIN: 35 NG/L
ALBUMIN SERPL BCG-MCNC: 3.6 G/DL (ref 3.5–5)
ALP SERPL-CCNC: 111 U/L (ref 34–104)
ALT SERPL W P-5'-P-CCNC: 19 U/L (ref 7–52)
ANION GAP SERPL CALCULATED.3IONS-SCNC: 13 MMOL/L (ref 4–13)
APTT PPP: 29 SECONDS (ref 23–34)
AST SERPL W P-5'-P-CCNC: 44 U/L (ref 13–39)
BASOPHILS # BLD AUTO: 0.02 THOUSANDS/ÂΜL (ref 0–0.1)
BASOPHILS NFR BLD AUTO: 0 % (ref 0–1)
BILIRUB SERPL-MCNC: 0.55 MG/DL (ref 0.2–1)
BUN SERPL-MCNC: 23 MG/DL (ref 5–25)
CALCIUM SERPL-MCNC: 9.3 MG/DL (ref 8.4–10.2)
CARDIAC TROPONIN I PNL SERPL HS: 35 NG/L (ref ?–50)
CARDIAC TROPONIN I PNL SERPL HS: 48 NG/L (ref ?–50)
CARDIAC TROPONIN I PNL SERPL HS: 70 NG/L (ref ?–50)
CHLORIDE SERPL-SCNC: 96 MMOL/L (ref 96–108)
CO2 SERPL-SCNC: 23 MMOL/L (ref 21–32)
CREAT SERPL-MCNC: 1.36 MG/DL (ref 0.6–1.3)
EOSINOPHIL # BLD AUTO: 0.04 THOUSAND/ÂΜL (ref 0–0.61)
EOSINOPHIL NFR BLD AUTO: 1 % (ref 0–6)
ERYTHROCYTE [DISTWIDTH] IN BLOOD BY AUTOMATED COUNT: 13.6 % (ref 11.6–15.1)
FLUAV AG UPPER RESP QL IA.RAPID: NEGATIVE
FLUBV AG UPPER RESP QL IA.RAPID: NEGATIVE
GFR SERPL CREATININE-BSD FRML MDRD: 37 ML/MIN/1.73SQ M
GLUCOSE SERPL-MCNC: 232 MG/DL (ref 65–140)
GLUCOSE SERPL-MCNC: 302 MG/DL (ref 65–140)
GLUCOSE SERPL-MCNC: 344 MG/DL (ref 65–140)
HCT VFR BLD AUTO: 32.6 % (ref 34.8–46.1)
HGB BLD-MCNC: 10.3 G/DL (ref 11.5–15.4)
IMM GRANULOCYTES # BLD AUTO: 0.05 THOUSAND/UL (ref 0–0.2)
IMM GRANULOCYTES NFR BLD AUTO: 1 % (ref 0–2)
INR PPP: 1.11 (ref 0.85–1.19)
LACTATE SERPL-SCNC: 1.2 MMOL/L (ref 0.5–2)
LACTATE SERPL-SCNC: 2.6 MMOL/L (ref 0.5–2)
LYMPHOCYTES # BLD AUTO: 0.65 THOUSANDS/ÂΜL (ref 0.6–4.47)
LYMPHOCYTES NFR BLD AUTO: 7 % (ref 14–44)
MCH RBC QN AUTO: 28.5 PG (ref 26.8–34.3)
MCHC RBC AUTO-ENTMCNC: 31.6 G/DL (ref 31.4–37.4)
MCV RBC AUTO: 90 FL (ref 82–98)
MONOCYTES # BLD AUTO: 0.6 THOUSAND/ÂΜL (ref 0.17–1.22)
MONOCYTES NFR BLD AUTO: 7 % (ref 4–12)
NEUTROPHILS # BLD AUTO: 7.51 THOUSANDS/ÂΜL (ref 1.85–7.62)
NEUTS SEG NFR BLD AUTO: 84 % (ref 43–75)
NRBC BLD AUTO-RTO: 0 /100 WBCS
PLATELET # BLD AUTO: 151 THOUSANDS/UL (ref 149–390)
PMV BLD AUTO: 11.2 FL (ref 8.9–12.7)
POTASSIUM SERPL-SCNC: 4.3 MMOL/L (ref 3.5–5.3)
PROCALCITONIN SERPL-MCNC: 9.5 NG/ML
PROT SERPL-MCNC: 8 G/DL (ref 6.4–8.4)
PROTHROMBIN TIME: 15 SECONDS (ref 12.3–15)
RBC # BLD AUTO: 3.61 MILLION/UL (ref 3.81–5.12)
SARS-COV+SARS-COV-2 AG RESP QL IA.RAPID: NEGATIVE
SODIUM SERPL-SCNC: 132 MMOL/L (ref 135–147)
WBC # BLD AUTO: 8.87 THOUSAND/UL (ref 4.31–10.16)

## 2025-06-27 PROCEDURE — 82948 REAGENT STRIP/BLOOD GLUCOSE: CPT

## 2025-06-27 PROCEDURE — 84484 ASSAY OF TROPONIN QUANT: CPT

## 2025-06-27 PROCEDURE — 85610 PROTHROMBIN TIME: CPT

## 2025-06-27 PROCEDURE — 99285 EMERGENCY DEPT VISIT HI MDM: CPT

## 2025-06-27 PROCEDURE — 87040 BLOOD CULTURE FOR BACTERIA: CPT | Performed by: INTERNAL MEDICINE

## 2025-06-27 PROCEDURE — 83605 ASSAY OF LACTIC ACID: CPT

## 2025-06-27 PROCEDURE — 93005 ELECTROCARDIOGRAM TRACING: CPT

## 2025-06-27 PROCEDURE — 84484 ASSAY OF TROPONIN QUANT: CPT | Performed by: INTERNAL MEDICINE

## 2025-06-27 PROCEDURE — 87811 SARS-COV-2 COVID19 W/OPTIC: CPT

## 2025-06-27 PROCEDURE — 80053 COMPREHEN METABOLIC PANEL: CPT

## 2025-06-27 PROCEDURE — 36415 COLL VENOUS BLD VENIPUNCTURE: CPT

## 2025-06-27 PROCEDURE — 99223 1ST HOSP IP/OBS HIGH 75: CPT | Performed by: INTERNAL MEDICINE

## 2025-06-27 PROCEDURE — 85730 THROMBOPLASTIN TIME PARTIAL: CPT

## 2025-06-27 PROCEDURE — 87804 INFLUENZA ASSAY W/OPTIC: CPT

## 2025-06-27 PROCEDURE — 71046 X-RAY EXAM CHEST 2 VIEWS: CPT

## 2025-06-27 PROCEDURE — 96360 HYDRATION IV INFUSION INIT: CPT

## 2025-06-27 PROCEDURE — 85025 COMPLETE CBC W/AUTO DIFF WBC: CPT

## 2025-06-27 PROCEDURE — 84145 PROCALCITONIN (PCT): CPT

## 2025-06-27 RX ORDER — HEPARIN SODIUM 5000 [USP'U]/ML
5000 INJECTION, SOLUTION INTRAVENOUS; SUBCUTANEOUS EVERY 8 HOURS SCHEDULED
Status: DISCONTINUED | OUTPATIENT
Start: 2025-06-27 | End: 2025-07-01 | Stop reason: HOSPADM

## 2025-06-27 RX ORDER — INSULIN LISPRO 100 [IU]/ML
1-5 INJECTION, SOLUTION INTRAVENOUS; SUBCUTANEOUS
Status: DISCONTINUED | OUTPATIENT
Start: 2025-06-27 | End: 2025-07-01 | Stop reason: HOSPADM

## 2025-06-27 RX ORDER — PRAVASTATIN SODIUM 40 MG
40 TABLET ORAL
Status: DISCONTINUED | OUTPATIENT
Start: 2025-06-28 | End: 2025-07-01 | Stop reason: HOSPADM

## 2025-06-27 RX ORDER — BENZONATATE 100 MG/1
100 CAPSULE ORAL 3 TIMES DAILY PRN
Status: DISCONTINUED | OUTPATIENT
Start: 2025-06-27 | End: 2025-07-01 | Stop reason: HOSPADM

## 2025-06-27 RX ORDER — AZITHROMYCIN 500 MG/1
500 TABLET, FILM COATED ORAL EVERY 24 HOURS
Status: DISCONTINUED | OUTPATIENT
Start: 2025-06-27 | End: 2025-06-27

## 2025-06-27 RX ORDER — ACETAMINOPHEN 10 MG/ML
1000 INJECTION, SOLUTION INTRAVENOUS ONCE
Status: COMPLETED | OUTPATIENT
Start: 2025-06-27 | End: 2025-06-27

## 2025-06-27 RX ORDER — SODIUM CHLORIDE 9 MG/ML
75 INJECTION, SOLUTION INTRAVENOUS CONTINUOUS
Status: DISCONTINUED | OUTPATIENT
Start: 2025-06-27 | End: 2025-06-29

## 2025-06-27 RX ADMIN — SODIUM CHLORIDE 75 ML/HR: 0.9 INJECTION, SOLUTION INTRAVENOUS at 20:25

## 2025-06-27 RX ADMIN — CEFTRIAXONE SODIUM 1000 MG: 10 INJECTION, POWDER, FOR SOLUTION INTRAVENOUS at 21:07

## 2025-06-27 RX ADMIN — AZITHROMYCIN MONOHYDRATE 500 MG: 500 INJECTION, POWDER, LYOPHILIZED, FOR SOLUTION INTRAVENOUS at 21:45

## 2025-06-27 RX ADMIN — INSULIN LISPRO 2 UNITS: 100 INJECTION, SOLUTION INTRAVENOUS; SUBCUTANEOUS at 22:02

## 2025-06-27 RX ADMIN — HEPARIN SODIUM 5000 UNITS: 5000 INJECTION INTRAVENOUS; SUBCUTANEOUS at 22:02

## 2025-06-27 RX ADMIN — ACETAMINOPHEN 1000 MG: 10 INJECTION INTRAVENOUS at 20:37

## 2025-06-27 RX ADMIN — INSULIN LISPRO 3 UNITS: 100 INJECTION, SOLUTION INTRAVENOUS; SUBCUTANEOUS at 19:26

## 2025-06-27 RX ADMIN — SODIUM CHLORIDE 1000 ML: 0.9 INJECTION, SOLUTION INTRAVENOUS at 17:11

## 2025-06-27 NOTE — ED PROVIDER NOTES
Time reflects when diagnosis was documented in both MDM as applicable and the Disposition within this note       Time User Action Codes Description Comment    6/27/2025  6:06 PM Mary LouColeen Add [J18.9] Pneumonia           ED Disposition       ED Disposition   Admit    Condition   Stable    Date/Time   Fri Jun 27, 2025  5:54 PM    Comment   Case was discussed with AMBER and the patient's admission status was agreed to be Admission Status: observation status to the service of Dr. Machado.               Assessment & Plan       Medical Decision Making  79-year-old female PMH of HTN, diabetes, breast cancer here for fever, chills, and bodyaches that started yesterday.  Family states they were in [place] yesterday and she had a syncopal episode there.  They were seen at a hospital there and were not told of anything significant.  Denies abdominal pain, N/V/D, urinary symptoms.  Patient met SIRS criteria on arrival, she had a low-grade fever and was tachycardic.  She is in no acute distress.  Labs showed hemoglobin of 10.3, she is at her baseline.  Glucose was 344, they are aware that sugars have not been controlled.,  There is no open gap.  Creatinine was 1.36, was 0.97 two months ago.  Initial troponin came back at 35, it was 6 two months ago. Lactic acid was 2.6, she is getting a bolus of NS. Procal was 9.5.  CXR showed probable right lower lobe pneumonia.  Discussed case with AMBER, she is to be obs under service of Dr. Machado.     Amount and/or Complexity of Data Reviewed  Labs: ordered. Decision-making details documented in ED Course.  Radiology: ordered.    Risk  Decision regarding hospitalization.        ED Course as of 06/27/25 1810   Fri Jun 27, 2025   1639 Meets SIRS with fever and tachycardia   1718 Hemoglobin(!): 10.3  At baseline   1734 SARS COV Rapid Antigen: Negative   1734 Influenza A Rapid Antigen: Negative   1734 Influenza B Rapid Antigen: Negative   1747 GLUCOSE(!): 344  Recently found that her HbA1c  was very high, she is on metformin.  Glucose significantly elevated today, however no gap.   1747 ANION GAP: 13   1747 Creatinine(!): 1.36  Up from 0.97 two months ago   1747 LACTIC ACID(!): 2.6   1748 Sodium(!): 132  She is getting NS   1749 hs TnI 0hr: 35  Was 6 two months ago   1755 Procalcitonin(!): 9.50       Medications   sodium chloride 0.9 % bolus 1,000 mL (1,000 mL Intravenous New Bag 6/27/25 1711)   benzonatate (TESSALON PERLES) capsule 100 mg (has no administration in time range)   heparin (porcine) subcutaneous injection 5,000 Units (has no administration in time range)   ceftriaxone (ROCEPHIN) 1 g/50 mL in dextrose IVPB (has no administration in time range)   azithromycin (ZITHROMAX) tablet 500 mg (has no administration in time range)   sodium chloride 0.9 % infusion (has no administration in time range)   insulin lispro (HumALOG/ADMELOG) 100 units/mL subcutaneous injection 1-5 Units (has no administration in time range)   insulin lispro (HumALOG/ADMELOG) 100 units/mL subcutaneous injection 1-5 Units (has no administration in time range)       ED Risk Strat Scores                    No data recorded        SBIRT 20yo+      Flowsheet Row Most Recent Value   Initial Alcohol Screen: US AUDIT-C     1. How often do you have a drink containing alcohol? 0 Filed at: 06/27/2025 1620   2. How many drinks containing alcohol do you have on a typical day you are drinking?  0 Filed at: 06/27/2025 1620   3b. FEMALE Any Age, or MALE 65+: How often do you have 4 or more drinks on one occassion? 0 Filed at: 06/27/2025 1620   Audit-C Score 0 Filed at: 06/27/2025 1620   MESHA: How many times in the past year have you...    Used an illegal drug or used a prescription medication for non-medical reasons? Never Filed at: 06/27/2025 1620                            History of Present Illness       Chief Complaint   Patient presents with    Flu Symptoms     Pt c/o fever, chills and body aches that started yesterday        Past  Medical History[1]   Past Surgical History[2]   Family History[3]   Social History[4]   E-Cigarette/Vaping    E-Cigarette Use Never User       E-Cigarette/Vaping Substances      I have reviewed and agree with the history as documented.     Patient is a 79-year-old female PMH of HTN, diabetes, breast cancer here for fever, chills, and bodyaches that started yesterday.  Family states they were in [place] yesterday and she had a syncopal episode there.  They were seen at a hospital there and were not told of anything significant.  Denies abdominal pain, N/V/D, urinary symptoms.  Family said she has been shivering for most of the day.      Flu Symptoms  Presenting symptoms: fever and myalgias    Presenting symptoms: no cough, no diarrhea, no headaches, no nausea, no rhinorrhea, no shortness of breath, no sore throat and no vomiting    Associated symptoms: chills and nasal congestion    Associated symptoms: no ear pain and no neck stiffness        Review of Systems   Constitutional:  Positive for chills and fever.   HENT:  Positive for congestion. Negative for ear pain, rhinorrhea and sore throat.    Eyes:  Negative for pain and visual disturbance.   Respiratory:  Negative for cough, chest tightness, shortness of breath and wheezing.    Cardiovascular:  Negative for chest pain and palpitations.   Gastrointestinal:  Negative for abdominal pain, diarrhea, nausea and vomiting.   Genitourinary:  Negative for difficulty urinating, dysuria, flank pain, frequency, hematuria and urgency.   Musculoskeletal:  Positive for myalgias. Negative for arthralgias, back pain, neck pain and neck stiffness.   Skin:  Negative for color change and rash.   Neurological:  Positive for weakness. Negative for dizziness, seizures, syncope, light-headedness and headaches.   All other systems reviewed and are negative.      Objective       ED Triage Vitals [06/27/25 1618]   Temperature Pulse Blood Pressure Respirations SpO2 Patient Position -  Orthostatic VS   (!) 100.9 °F (38.3 °C) 94 160/75 20 99 % Sitting      Temp Source Heart Rate Source BP Location FiO2 (%) Pain Score    Oral Monitor Left arm -- --      Vitals      Date and Time Temp Pulse SpO2 Resp BP Pain Score FACES Pain Rating User   06/27/25 1715 -- 96 95 % 18 183/86 -- -- TS   06/27/25 1630 -- 103 93 % 18 137/70 -- -- TS   06/27/25 1618 100.9 °F (38.3 °C) 94 99 % 20 160/75 -- -- RJ            Physical Exam  Vitals and nursing note reviewed.   Constitutional:       General: She is not in acute distress.     Appearance: Normal appearance. She is not ill-appearing, toxic-appearing or diaphoretic.   HENT:      Head: Normocephalic and atraumatic.      Right Ear: Tympanic membrane, ear canal and external ear normal.      Left Ear: Tympanic membrane normal.      Nose: Nose normal.      Mouth/Throat:      Mouth: Mucous membranes are moist.      Pharynx: Oropharynx is clear. No oropharyngeal exudate or posterior oropharyngeal erythema.     Eyes:      Extraocular Movements: Extraocular movements intact.      Conjunctiva/sclera: Conjunctivae normal.      Pupils: Pupils are equal, round, and reactive to light.       Cardiovascular:      Rate and Rhythm: Regular rhythm. Tachycardia present.      Pulses: Normal pulses.      Heart sounds: Normal heart sounds.   Pulmonary:      Effort: Pulmonary effort is normal. No tachypnea, accessory muscle usage or respiratory distress.      Breath sounds: Normal breath sounds. No wheezing, rhonchi or rales.   Abdominal:      General: Abdomen is flat. There is no distension.      Palpations: Abdomen is soft.      Tenderness: There is no abdominal tenderness. There is no guarding or rebound.     Musculoskeletal:         General: Normal range of motion.      Cervical back: Normal range of motion and neck supple.     Skin:     General: Skin is warm and dry.      Capillary Refill: Capillary refill takes less than 2 seconds.     Neurological:      General: No focal deficit  present.      Mental Status: She is alert and oriented to person, place, and time.         Results Reviewed       Procedure Component Value Units Date/Time    Blood culture [[id number]]     Lab Status: No result Specimen: Blood     Blood culture [[id number]]     Lab Status: No result Specimen: Blood     Procalcitonin [[id number]]  (Abnormal) Collected: 06/27/25 1707    Lab Status: Final result Specimen: Blood from Arm, Left Updated: 06/27/25 1751     Procalcitonin 9.50 ng/ml     HS Troponin I 2hr [[id number]]     Lab Status: No result Specimen: Blood     HS Troponin 0hr (reflex protocol) [[id number]]  (Normal) Collected: 06/27/25 1707    Lab Status: Final result Specimen: Blood from Arm, Left Updated: 06/27/25 1748     hs TnI 0hr 35 ng/L     Lactic acid [[id number]]  (Abnormal) Collected: 06/27/25 1707    Lab Status: Final result Specimen: Blood from Arm, Left Updated: 06/27/25 1746     LACTIC ACID 2.6 mmol/L     Narrative:      Result may be elevated if tourniquet was used during collection.    Lactic acid 2 Hours [[id number]]     Lab Status: No result Specimen: Blood     Comprehensive metabolic panel [[id number]]  (Abnormal) Collected: 06/27/25 1707    Lab Status: Final result Specimen: Blood from Arm, Left Updated: 06/27/25 1745     Sodium 132 mmol/L      Potassium 4.3 mmol/L      Chloride 96 mmol/L      CO2 23 mmol/L      ANION GAP 13 mmol/L      BUN 23 mg/dL      Creatinine 1.36 mg/dL      Glucose 344 mg/dL      Calcium 9.3 mg/dL      AST 44 U/L      ALT 19 U/L      Alkaline Phosphatase 111 U/L      Total Protein 8.0 g/dL      Albumin 3.6 g/dL      Total Bilirubin 0.55 mg/dL      eGFR 37 ml/min/1.73sq m     Narrative:      National Kidney Disease Foundation guidelines for Chronic Kidney Disease (CKD):     Stage 1 with normal or high GFR (GFR > 90 mL/min/1.73 square meters)    Stage 2 Mild CKD (GFR = 60-89 mL/min/1.73 square meters)    Stage 3A Moderate CKD (GFR = 45-59 mL/min/1.73 square meters)    Stage 3B Moderate  CKD (GFR = 30-44 mL/min/1.73 square meters)    Stage 4 Severe CKD (GFR = 15-29 mL/min/1.73 square meters)    Stage 5 End Stage CKD (GFR <15 mL/min/1.73 square meters)  Note: GFR calculation is accurate only with a steady state creatinine    FLU/COVID Rapid Antigen (30 min. TAT) - Preferred screening test in ED [[id number]]  (Normal) Collected: 06/27/25 1707    Lab Status: Final result Specimen: Nares from Nose Updated: 06/27/25 1734     SARS COV Rapid Antigen Negative     Influenza A Rapid Antigen Negative     Influenza B Rapid Antigen Negative    Narrative:      This test has been performed using the [employer] Sarah 2 FLU+SARS Antigen test under the Emergency Use Authorization (EUA). This test has been validated by the  and verified by the performing laboratory. The Sarah uses lateral flow immunofluorescent sandwich assay to detect SARS-COV, Influenza A and Influenza B Antigen.     The Quidel Sarah 2 SARS Antigen test does not differentiate between SARS-CoV and SARS-CoV-2.     Negative results are presumptive and may be confirmed with a molecular assay, if necessary, for patient management. Negative results do not rule out SARS-CoV-2 or influenza infection and should not be used as the sole basis for treatment or patient management decisions. A negative test result may occur if the level of antigen in a sample is below the limit of detection of this test.     Positive results are indicative of the presence of viral antigens, but do not rule out bacterial infection or co-infection with other viruses.     All test results should be used as an adjunct to clinical observations and other information available to the provider.    FOR PEDIATRIC PATIENTS - copy/paste COVID Guidelines URL to browser: [email or web address]    Protime-INR [[id number]]  (Normal) Collected: 06/27/25 1707    Lab Status: Final result Specimen: Blood from Arm, Left Updated: 06/27/25 1731      Protime 15.0 seconds      INR 1.11    Narrative:      INR Therapeutic Range    Indication                                             INR Range      Atrial Fibrillation                                               2.0-3.0  Hypercoagulable State                                    2.0.2.3  Left Ventricular Asist Device                            2.0-3.0  Mechanical Heart Valve                                  -    Aortic(with afib, MI, embolism, HF, LA enlargement,    and/or coagulopathy)                                     2.0-3.0 (2.5-3.5)     Mitral                                                             2.5-3.5  Prosthetic/Bioprosthetic Heart Valve               2.0-3.0  Venous thromboembolism (VTE: VT, PE        2.0-3.0    APTT [[id number]]  (Normal) Collected: 06/27/25 1707    Lab Status: Final result Specimen: Blood from Arm, Left Updated: 06/27/25 1731     PTT 29 seconds     CBC and differential [[id number]]  (Abnormal) Collected: 06/27/25 1707    Lab Status: Final result Specimen: Blood from Arm, Left Updated: 06/27/25 1714     WBC 8.87 Thousand/uL      RBC 3.61 Million/uL      Hemoglobin 10.3 g/dL      Hematocrit 32.6 %      MCV 90 fL      MCH 28.5 pg      MCHC 31.6 g/dL      RDW 13.6 %      MPV 11.2 fL      Platelets 151 Thousands/uL      nRBC 0 /100 WBCs      Segmented % 84 %      Immature Grans % 1 %      Lymphocytes % 7 %      Monocytes % 7 %      Eosinophils Relative 1 %      Basophils Relative 0 %      Absolute Neutrophils 7.51 Thousands/µL      Absolute Immature Grans 0.05 Thousand/uL      Absolute Lymphocytes 0.65 Thousands/µL      Absolute Monocytes 0.60 Thousand/µL      Eosinophils Absolute 0.04 Thousand/µL      Basophils Absolute 0.02 Thousands/µL     UA w Reflex to Microscopic w Reflex to Culture [[id number]]     Lab Status: No result Specimen: Urine             XR chest pa & lateral   Final Interpretation by Kg Montanez MD (06/27 1757)      Consolidation in the right lung base  compatible with pneumonia. Suggest follow-up to ensure resolution after treatment      The study was marked in EPIC for immediate notification.            Workstation performed: [id number]             ECG 12 Lead Documentation Only    Date/Time: 6/27/2025 5:14 PM    Performed by: Coleen Barreto PA-C  Authorized by: Coleen Barreto PA-C    ECG reviewed by me, the ED Provider: yes    Patient location:  ED  Interpretation:     Interpretation: normal    Rate:     ECG rate:  97    ECG rate assessment: normal    Rhythm:     Rhythm: sinus rhythm    Ectopy:     Ectopy: none    QRS:     QRS axis:  Normal    QRS intervals:  Normal  Conduction:     Conduction: normal    ST segments:     ST segments:  Normal  T waves:     T waves: normal        ED Medication and Procedure Management   Prior to Admission Medications   Prescriptions Last Dose Informant Patient Reported? Taking?   metFORMIN (GLUCOPHAGE) 1000 MG tablet   Yes No   Sig: Take 1,000 mg by mouth 2 (two) times a day with meals   simvastatin (ZOCOR) 20 mg tablet   Yes No   Sig: Take 20 mg by mouth daily at bedtime   valsartan-hydrochlorothiazide (DIOVAN-HCT) 160-12.5 MG per tablet   Yes No   Sig: Take 1 tablet by mouth daily      Facility-Administered Medications: None     Patient's Medications   Discharge Prescriptions    No medications on file     No discharge procedures on file.  ED SEPSIS DOCUMENTATION   Time reflects when diagnosis was documented in both MDM as applicable and the Disposition within this note       Time User Action Codes Description Comment    6/27/2025  6:06 PM Coleen Barreto Add [J18.9] Pneumonia                      [1]   Past Medical History:  Diagnosis Date    Cancer (HCC)     BREAST    Diabetes mellitus (HCC)     Hypertension    [2]   Past Surgical History:  Procedure Laterality Date    BREAST SURGERY     [3] No family history on file.  [4]   Social History  Tobacco Use    Smoking status: Never    Smokeless tobacco: Never   Vaping Use     Vaping status: Never Used   Substance Use Topics    Alcohol use: Not Currently    Drug use: Never        Coleen Barreto PA-C  06/27/25 1471

## 2025-06-27 NOTE — ASSESSMENT & PLAN NOTE
"Lab Results   Component Value Date    HGBA1C 8.3 (H) 04/15/2020       No results for input(s): \"POCGLU\" in the last 72 hours.    Blood Sugar Average: Last 72 hrs:  hold PO agents  SSI  Monitor BG  "

## 2025-06-27 NOTE — H&P
"H&P - Hospitalist   Name: Ariella Shepherd 79 y.o. female I MRN: [id number]  Unit/Bed#: ED 09 I Date of Admission: 6/27/2025   Date of Service: 6/27/2025 I Hospital Day: 0     Assessment & Plan  Sepsis ([facility])  Presented with fever 100.9, procal 9, tachycardia, elevated lactate of 2.3 suspect possibly from RLL PNA as noted on CXR  Will provide Ceftriaxone, azithro  F/u blood cultures  Trend lactate until <2  DM (diabetes mellitus) ([facility])  Lab Results   Component Value Date    HGBA1C 8.3 (H) 04/15/2020       No results for input(s): \"POCGLU\" in the last 72 hours.    Blood Sugar Average: Last 72 hrs:  hold PO agents  SSI  Monitor BG  HTN (hypertension)  Bp controlled  Hold hctz, arb in setting of julee  Hydrlazine as needed  JULEE (acute kidney injury) ([facility])  Baseline cr 1  Creatinine noted to be 1.36  Suspect from fever/sepsis  Will provide iv fluids  Repeat bmp in am      VTE Pharmacologic Prophylaxis:   Moderate Risk (Score 3-4) - Pharmacological DVT Prophylaxis Ordered: heparin.  Code Status: full code  Discussion with family: Patient declined call to .     Anticipated Length of Stay: Patient will be admitted on an inpatient basis with an anticipated length of stay of greater than 2 midnights secondary to Sepsis.    History of Present Illness   Chief Complaint: Jace Shepherd is a 79 y.o. female with PMH of HTN, DM who initally presented with fevers, chills and bodyaches that began  yesterday. She otherwise denies any other complaints. Denies cp, sob, cough, abdominal , nausea, vomiting, dysura.   Review of Systems   Constitutional:  Positive for appetite change, chills and fever. Negative for activity change, diaphoresis and unexpected weight change.   HENT:  Negative for congestion, facial swelling and rhinorrhea.    Eyes:  Negative for photophobia and visual disturbance.   Respiratory:  Negative for cough, shortness of breath and wheezing.    Cardiovascular:  Negative for chest pain and palpitations. "   Gastrointestinal:  Negative for abdominal pain, blood in stool, constipation, diarrhea, nausea and vomiting.   Genitourinary:  Negative for decreased urine volume, difficulty urinating, dysuria, flank pain, frequency, hematuria and urgency.   Musculoskeletal:  Negative for arthralgias, back pain, joint swelling and myalgias.   Neurological:  Negative for dizziness, syncope, facial asymmetry, light-headedness, numbness and headaches.   Psychiatric/Behavioral:  Negative for confusion and decreased concentration. The patient is not nervous/anxious.        Historical Information   Past Medical History[1]  Past Surgical History[2]  Social History[3]  E-Cigarette/Vaping    E-Cigarette Use Never User      E-Cigarette/Vaping Substances     Family History[4]  Social History:  Marital Status:      Patient Pre-hospital Living Situation: Home  Patient Pre-hospital Level of Mobility: walks  Patient Pre-hospital Diet Restrictions: none    Meds/Allergies   I have reviewed home medications with patient personally.  Prior to Admission medications    Medication Sig Start Date End Date Taking? Authorizing Provider   metFORMIN (GLUCOPHAGE) 1000 MG tablet Take 1,000 mg by mouth 2 (two) times a day with meals    Historical Provider, MD   simvastatin (ZOCOR) 20 mg tablet Take 20 mg by mouth daily at bedtime    Historical Provider, MD   valsartan-hydrochlorothiazide (DIOVAN-HCT) 160-12.5 MG per tablet Take 1 tablet by mouth daily    Historical Provider, MD     No Known Allergies    Objective :  Temp:  [100.9 °F (38.3 °C)] 100.9 °F (38.3 °C)  HR:  [] 96  BP: (137-183)/(70-86) 183/86  Resp:  [18-20] 18  SpO2:  [93 %-99 %] 95 %  O2 Device: None (Room air)    Physical Exam  Constitutional:       General: She is not in acute distress.     Appearance: She is well-developed. She is not diaphoretic.   HENT:      Head: Normocephalic and atraumatic.      Nose: Nose normal.      Mouth/Throat:      Pharynx: No oropharyngeal exudate.      Eyes:      General: No scleral icterus.        Right eye: No discharge.         Left eye: No discharge.      Conjunctiva/sclera: Conjunctivae normal.     Neck:      Thyroid: No thyromegaly.      Vascular: No JVD.     Cardiovascular:      Rate and Rhythm: Normal rate and regular rhythm.      Heart sounds: Normal heart sounds. No murmur heard.     No friction rub. No gallop.   Pulmonary:      Effort: Pulmonary effort is normal. No respiratory distress.      Breath sounds: Normal breath sounds. No wheezing or rales.   Chest:      Chest wall: No tenderness.   Abdominal:      General: Bowel sounds are normal. There is no distension.      Palpations: Abdomen is soft.      Tenderness: There is no abdominal tenderness. There is no guarding or rebound.     Musculoskeletal:         General: No tenderness or deformity. Normal range of motion.      Cervical back: Normal range of motion and neck supple.     Skin:     General: Skin is warm and dry.      Findings: No erythema or rash.     Neurological:      Mental Status: She is alert. Mental status is at baseline.      Cranial Nerves: No cranial nerve deficit.      Sensory: No sensory deficit.      Motor: No abnormal muscle tone.      Coordination: Coordination normal.          Lines/Drains:            Lab Results: I have reviewed the following results:  Results from last 7 days   Lab Units 06/27/25  1707   WBC Thousand/uL 8.87   HEMOGLOBIN g/dL 10.3*   HEMATOCRIT % 32.6*   PLATELETS Thousands/uL 151   SEGS PCT % 84*   LYMPHO PCT % 7*   MONO PCT % 7   EOS PCT % 1     Results from last 7 days   Lab Units 06/27/25  1707   SODIUM mmol/L 132*   POTASSIUM mmol/L 4.3   CHLORIDE mmol/L 96   CO2 mmol/L 23   BUN mg/dL 23   CREATININE mg/dL 1.36*   ANION GAP mmol/L 13   CALCIUM mg/dL 9.3   ALBUMIN g/dL 3.6   TOTAL BILIRUBIN mg/dL 0.55   ALK PHOS U/L 111*   ALT U/L 19   AST U/L 44*   GLUCOSE RANDOM mg/dL 344*     Results from last 7 days   Lab Units 06/27/25  1707   INR  1.11          Lab Results   Component Value Date    HGBA1C 8.3 (H) 04/15/2020     Results from last 7 days   Lab Units 06/27/25  1707   LACTIC ACID mmol/L 2.6*   PROCALCITONIN ng/ml 9.50*       Imaging Results Review: I personally reviewed the following image studies/reports in PACS and discussed pertinent findings with Radiology: chest xray. My interpretation of the radiology images/reports is:  .  Other Study Results Review: EKG was reviewed.     Administrative Statements   I have spent a total time of 76 minutes in caring for this patient on the day of the visit/encounter including Diagnostic results.    ** Please Note: This note has been constructed using a voice recognition system. **         [1]   Past Medical History:  Diagnosis Date    Cancer (HCC)     BREAST    Diabetes mellitus (HCC)     Hypertension    [2]   Past Surgical History:  Procedure Laterality Date    BREAST SURGERY     [3]   Social History  Tobacco Use    Smoking status: Never    Smokeless tobacco: Never   Vaping Use    Vaping status: Never Used   Substance and Sexual Activity    Alcohol use: Not Currently    Drug use: Never   [4] No family history on file.

## 2025-06-27 NOTE — ASSESSMENT & PLAN NOTE
Presented with fever 100.9, procal 9, tachycardia, elevated lactate of 2.3 suspect possibly from RLL PNA as noted on CXR  Will provide Ceftriaxone, azithro  F/u blood cultures  Trend lactate until <2

## 2025-06-27 NOTE — ED NOTES
Gave patient Zithromax to swallow and she would not swallow the pill, just would keep the water in her mouth. Patient did spit out some of the pill residue and I used suction to get the rest out of her mouth. Provider made aware and making the switch to IV Zithromax.      Melissa Huynh RN  06/27/25 1954

## 2025-06-28 LAB
ANION GAP SERPL CALCULATED.3IONS-SCNC: 13 MMOL/L (ref 4–13)
ATRIAL RATE: 84 BPM
ATRIAL RATE: 97 BPM
BACTERIA UR QL AUTO: ABNORMAL /HPF
BASOPHILS # BLD AUTO: 0.01 THOUSANDS/ÂΜL (ref 0–0.1)
BASOPHILS NFR BLD AUTO: 0 % (ref 0–1)
BILIRUB UR QL STRIP: NEGATIVE
BUN SERPL-MCNC: 21 MG/DL (ref 5–25)
CALCIUM SERPL-MCNC: 8.9 MG/DL (ref 8.4–10.2)
CHLORIDE SERPL-SCNC: 102 MMOL/L (ref 96–108)
CLARITY UR: CLEAR
CO2 SERPL-SCNC: 22 MMOL/L (ref 21–32)
COLOR UR: YELLOW
CREAT SERPL-MCNC: 1.25 MG/DL (ref 0.6–1.3)
EOSINOPHIL # BLD AUTO: 0 THOUSAND/ÂΜL (ref 0–0.61)
EOSINOPHIL NFR BLD AUTO: 0 % (ref 0–6)
ERYTHROCYTE [DISTWIDTH] IN BLOOD BY AUTOMATED COUNT: 13.6 % (ref 11.6–15.1)
FLUAV RNA RESP QL NAA+PROBE: NEGATIVE
FLUBV RNA RESP QL NAA+PROBE: NEGATIVE
GFR SERPL CREATININE-BSD FRML MDRD: 41 ML/MIN/1.73SQ M
GLUCOSE P FAST SERPL-MCNC: 191 MG/DL (ref 65–99)
GLUCOSE SERPL-MCNC: 190 MG/DL (ref 65–140)
GLUCOSE SERPL-MCNC: 191 MG/DL (ref 65–140)
GLUCOSE SERPL-MCNC: 203 MG/DL (ref 65–140)
GLUCOSE SERPL-MCNC: 254 MG/DL (ref 65–140)
GLUCOSE SERPL-MCNC: 308 MG/DL (ref 65–140)
GLUCOSE UR STRIP-MCNC: ABNORMAL MG/DL
HCT VFR BLD AUTO: 29.8 % (ref 34.8–46.1)
HGB BLD-MCNC: 9.5 G/DL (ref 11.5–15.4)
HGB UR QL STRIP.AUTO: ABNORMAL
IMM GRANULOCYTES # BLD AUTO: 0.04 THOUSAND/UL (ref 0–0.2)
IMM GRANULOCYTES NFR BLD AUTO: 1 % (ref 0–2)
KETONES UR STRIP-MCNC: ABNORMAL MG/DL
LEUKOCYTE ESTERASE UR QL STRIP: NEGATIVE
LYMPHOCYTES # BLD AUTO: 0.63 THOUSANDS/ÂΜL (ref 0.6–4.47)
LYMPHOCYTES NFR BLD AUTO: 8 % (ref 14–44)
MCH RBC QN AUTO: 27.9 PG (ref 26.8–34.3)
MCHC RBC AUTO-ENTMCNC: 31.9 G/DL (ref 31.4–37.4)
MCV RBC AUTO: 87 FL (ref 82–98)
MONOCYTES # BLD AUTO: 0.53 THOUSAND/ÂΜL (ref 0.17–1.22)
MONOCYTES NFR BLD AUTO: 7 % (ref 4–12)
NEUTROPHILS # BLD AUTO: 6.69 THOUSANDS/ÂΜL (ref 1.85–7.62)
NEUTS SEG NFR BLD AUTO: 84 % (ref 43–75)
NITRITE UR QL STRIP: NEGATIVE
NON-SQ EPI CELLS URNS QL MICRO: ABNORMAL /HPF
NRBC BLD AUTO-RTO: 0 /100 WBCS
P AXIS: 81 DEGREES
P AXIS: 84 DEGREES
PH UR STRIP.AUTO: 6 [PH]
PLATELET # BLD AUTO: 146 THOUSANDS/UL (ref 149–390)
PMV BLD AUTO: 10.9 FL (ref 8.9–12.7)
POTASSIUM SERPL-SCNC: 3.8 MMOL/L (ref 3.5–5.3)
PR INTERVAL: 152 MS
PR INTERVAL: 162 MS
PROCALCITONIN SERPL-MCNC: 16.4 NG/ML
PROT UR STRIP-MCNC: ABNORMAL MG/DL
QRS AXIS: 62 DEGREES
QRS AXIS: 63 DEGREES
QRSD INTERVAL: 82 MS
QRSD INTERVAL: 92 MS
QT INTERVAL: 330 MS
QT INTERVAL: 380 MS
QTC INTERVAL: 419 MS
QTC INTERVAL: 450 MS
RBC # BLD AUTO: 3.41 MILLION/UL (ref 3.81–5.12)
RBC #/AREA URNS AUTO: ABNORMAL /HPF
RSV RNA RESP QL NAA+PROBE: NEGATIVE
SARS-COV-2 RNA RESP QL NAA+PROBE: NEGATIVE
SODIUM SERPL-SCNC: 137 MMOL/L (ref 135–147)
SP GR UR STRIP.AUTO: 1.02 (ref 1–1.03)
T WAVE AXIS: 65 DEGREES
T WAVE AXIS: 78 DEGREES
UROBILINOGEN UR STRIP-ACNC: <2 MG/DL
VENTRICULAR RATE: 84 BPM
VENTRICULAR RATE: 97 BPM
WBC # BLD AUTO: 7.9 THOUSAND/UL (ref 4.31–10.16)
WBC #/AREA URNS AUTO: ABNORMAL /HPF

## 2025-06-28 PROCEDURE — 93010 ELECTROCARDIOGRAM REPORT: CPT | Performed by: INTERNAL MEDICINE

## 2025-06-28 PROCEDURE — 85025 COMPLETE CBC W/AUTO DIFF WBC: CPT | Performed by: INTERNAL MEDICINE

## 2025-06-28 PROCEDURE — 84145 PROCALCITONIN (PCT): CPT | Performed by: INTERNAL MEDICINE

## 2025-06-28 PROCEDURE — 92610 EVALUATE SWALLOWING FUNCTION: CPT | Performed by: SPEECH-LANGUAGE PATHOLOGIST

## 2025-06-28 PROCEDURE — 94664 DEMO&/EVAL PT USE INHALER: CPT

## 2025-06-28 PROCEDURE — 0241U HB NFCT DS VIR RESP RNA 4 TRGT: CPT | Performed by: INTERNAL MEDICINE

## 2025-06-28 PROCEDURE — 99232 SBSQ HOSP IP/OBS MODERATE 35: CPT | Performed by: INTERNAL MEDICINE

## 2025-06-28 PROCEDURE — 81001 URINALYSIS AUTO W/SCOPE: CPT | Performed by: INTERNAL MEDICINE

## 2025-06-28 PROCEDURE — 82948 REAGENT STRIP/BLOOD GLUCOSE: CPT

## 2025-06-28 PROCEDURE — 80048 BASIC METABOLIC PNL TOTAL CA: CPT | Performed by: INTERNAL MEDICINE

## 2025-06-28 RX ORDER — ACETAMINOPHEN 325 MG/1
650 TABLET ORAL EVERY 6 HOURS PRN
Status: DISCONTINUED | OUTPATIENT
Start: 2025-06-28 | End: 2025-07-01 | Stop reason: HOSPADM

## 2025-06-28 RX ADMIN — INSULIN LISPRO 2 UNITS: 100 INJECTION, SOLUTION INTRAVENOUS; SUBCUTANEOUS at 22:30

## 2025-06-28 RX ADMIN — ACETAMINOPHEN 650 MG: 325 TABLET ORAL at 16:26

## 2025-06-28 RX ADMIN — HEPARIN SODIUM 5000 UNITS: 5000 INJECTION INTRAVENOUS; SUBCUTANEOUS at 05:41

## 2025-06-28 RX ADMIN — AZITHROMYCIN MONOHYDRATE 500 MG: 500 INJECTION, POWDER, LYOPHILIZED, FOR SOLUTION INTRAVENOUS at 19:57

## 2025-06-28 RX ADMIN — SODIUM CHLORIDE 75 ML/HR: 0.9 INJECTION, SOLUTION INTRAVENOUS at 12:06

## 2025-06-28 RX ADMIN — HEPARIN SODIUM 5000 UNITS: 5000 INJECTION INTRAVENOUS; SUBCUTANEOUS at 21:44

## 2025-06-28 RX ADMIN — INSULIN LISPRO 3 UNITS: 100 INJECTION, SOLUTION INTRAVENOUS; SUBCUTANEOUS at 16:27

## 2025-06-28 RX ADMIN — CEFTRIAXONE SODIUM 1000 MG: 10 INJECTION, POWDER, FOR SOLUTION INTRAVENOUS at 18:15

## 2025-06-28 RX ADMIN — PRAVASTATIN SODIUM 40 MG: 40 TABLET ORAL at 16:25

## 2025-06-28 NOTE — PROGRESS NOTES
Progress Note - Hospitalist   Name: Ariella Shepherd 79 y.o. female I MRN: [id number]  Unit/Bed#: -01 I Date of Admission: 6/27/2025   Date of Service: 6/28/2025 I Hospital Day: 0    Assessment & Plan  Sepsis (Formerly [facility])  Presented with fever 100.9, procal 9, tachycardia, elevated lactate of 2.3 suspect possibly from RLL PNA as noted on CXR  Continue ceftriaxone and azithromycin  F/u blood cultures  Lactate is since normalized  Monitor WBC count  DM (diabetes mellitus) (Formerly [facility])  Lab Results   Component Value Date    HGBA1C 8.3 (H) 04/15/2020       Recent Labs     06/27/25  1903 06/27/25  2109 06/28/25  0630   POCGLU 302* 232* 190*       Blood Sugar Average: Last 72 hrs:  (P) [id number] PO agents  SSI  Monitor BG  HTN (hypertension)  Bp controlled  Hold hctz, arb in setting of julee  Hydrlazine as needed  JULEE (acute kidney injury) (Formerly [facility])  Baseline cr 1  Creatinine noted to be 1.36  Suspect from fever/sepsis  Creatinine improving  P.o. intake slowly improving  Will continue IV fluids for now    VTE Pharmacologic Prophylaxis: VTE Score: 6 High Risk (Score >/= 5) - Pharmacological DVT Prophylaxis Ordered: heparin. Sequential Compression Devices Ordered.    Mobility:   Basic Mobility Inpatient Raw Score: 15  JH-HLM Goal: 4: Move to chair/commode  JH-HLM Achieved: 1: Laying in bed  JH-HLM Goal achieved. Continue to encourage appropriate mobility.    Patient Centered Rounds: I performed bedside rounds with nursing staff today.   Discussions with Specialists or Other Care Team Provider: cm,  nursing    Education and Discussions with Family / Patient: Patient declined call to .     Current Length of Stay: 0 day(s)  Current Patient Status: Observation   Certification Statement: The patient will continue to require additional inpatient hospital stay due to see below  Discharge Plan:   Still requiring IV antibiotics.  Anticipate 48 hrs    Code Status: Level 1 - Full Code    Subjective   Denies chest pain,  cough, abdominal pain, nausea, vomiting or any other complaints    Objective :  Temp:  [99.5 °F (37.5 °C)-103.2 °F (39.6 °C)] 99.5 °F (37.5 °C)  HR:  [] 92  BP: (129-183)/(57-86) 129/57  Resp:  [16-20] 16  SpO2:  [93 %-99 %] 95 %  O2 Device: None (Room air)    Body mass index is 22.18 kg/m².     Input and Output Summary (last 24 hours):     Intake/Output Summary (Last 24 hours) at 6/28/2025 1031  Last data filed at 6/28/2025 0544  Gross per 24 hour   Intake 0 ml   Output 500 ml   Net -500 ml       Physical Exam  Constitutional:       General: She is not in acute distress.     Appearance: She is well-developed. She is not diaphoretic.   HENT:      Head: Normocephalic and atraumatic.      Nose: Nose normal.      Mouth/Throat:      Pharynx: No oropharyngeal exudate.     Eyes:      General: No scleral icterus.        Right eye: No discharge.         Left eye: No discharge.      Conjunctiva/sclera: Conjunctivae normal.     Neck:      Thyroid: No thyromegaly.      Vascular: No JVD.     Cardiovascular:      Rate and Rhythm: Normal rate and regular rhythm.      Heart sounds: Normal heart sounds. No murmur heard.     No friction rub. No gallop.   Pulmonary:      Effort: Pulmonary effort is normal. No respiratory distress.      Breath sounds: Normal breath sounds. No wheezing or rales.   Chest:      Chest wall: No tenderness.   Abdominal:      General: Bowel sounds are normal. There is no distension.      Palpations: Abdomen is soft.      Tenderness: There is no abdominal tenderness. There is no guarding or rebound.     Musculoskeletal:         General: No tenderness or deformity. Normal range of motion.      Cervical back: Normal range of motion and neck supple.     Skin:     General: Skin is warm and dry.      Findings: No erythema or rash.     Neurological:      Mental Status: She is alert. Mental status is at baseline.      Cranial Nerves: No cranial nerve deficit.      Sensory: No sensory deficit.      Motor: No  abnormal muscle tone.      Coordination: Coordination normal.           Lines/Drains:              Lab Results: I have reviewed the following results:   Results from last 7 days   Lab Units 06/28/25  0558   WBC Thousand/uL 7.90   HEMOGLOBIN g/dL 9.5*   HEMATOCRIT % 29.8*   PLATELETS Thousands/uL 146*   SEGS PCT % 84*   LYMPHO PCT % 8*   MONO PCT % 7   EOS PCT % 0     Results from last 7 days   Lab Units 06/28/25  0558 06/27/25  1707   SODIUM mmol/L 137 132*   POTASSIUM mmol/L 3.8 4.3   CHLORIDE mmol/L 102 96   CO2 mmol/L 22 23   BUN mg/dL 21 23   CREATININE mg/dL 1.25 1.36*   ANION GAP mmol/L 13 13   CALCIUM mg/dL 8.9 9.3   ALBUMIN g/dL  --  3.6   TOTAL BILIRUBIN mg/dL  --  0.55   ALK PHOS U/L  --  111*   ALT U/L  --  19   AST U/L  --  44*   GLUCOSE RANDOM mg/dL 191* 344*     Results from last 7 days   Lab Units 06/27/25  1707   INR  1.11     Results from last 7 days   Lab Units 06/28/25  0630 06/27/25  2109 06/27/25  1903   POC GLUCOSE mg/dl 190* 232* 302*         Results from last 7 days   Lab Units 06/28/25  0558 06/27/25  1942 06/27/25  1707   LACTIC ACID mmol/L  --  1.2 2.6*   PROCALCITONIN ng/ml 16.40*  --  9.50*       Recent Cultures (last 7 days):   Results from last 7 days   Lab Units 06/27/25  1850 06/27/25  1840   BLOOD CULTURE  Received in Microbiology Lab. Culture in Progress. Received in Microbiology Lab. Culture in Progress.       Imaging Results Review: I personally reviewed the following image studies/reports in PACS and discussed pertinent findings with Radiology: chest xray. My interpretation of the radiology images/reports is:  .  Other Study Results Review: EKG was reviewed.     Last 24 Hours Medication List:     Current Facility-Administered Medications:     azithromycin (ZITHROMAX) 500 mg in sodium chloride 0.9% 250mL IVPB 500 mg, Q24H    benzonatate (TESSALON PERLES) capsule 100 mg, TID PRN    ceftriaxone (ROCEPHIN) 1 g/50 mL in dextrose IVPB, Q24H, Last Rate: 1,000 mg (06/27/25 2109)     heparin (porcine) subcutaneous injection 5,000 Units, Q8H SNOW **AND** [CANCELED] Platelet count, Once    insulin lispro (HumALOG/ADMELOG) 100 units/mL subcutaneous injection 1-5 Units, TID AC **AND** Fingerstick Glucose (POCT), TID AC    insulin lispro (HumALOG/ADMELOG) 100 units/mL subcutaneous injection 1-5 Units, HS    pravastatin (PRAVACHOL) tablet 40 mg, Daily With Dinner    sodium chloride 0.9 % infusion, Continuous, Last Rate: 75 mL/hr (06/27/25 2025)    Administrative Statements   Today, Patient Was Seen By: Grant Machado MD  I have spent a total time of 30 minutes in caring for this patient on the day of the visit/encounter including Diagnostic results.    **Please Note: This note may have been constructed using a voice recognition system.**

## 2025-06-28 NOTE — QUICK NOTE
RN reported that pt keeping saliva in her mouth and given signs of choking/aspiration risk. Will keep the pt NPO with SLP consult.

## 2025-06-28 NOTE — ASSESSMENT & PLAN NOTE
Presented with fever 100.9, procal 9, tachycardia, elevated lactate of 2.3 suspect possibly from RLL PNA as noted on CXR  Continue ceftriaxone and azithromycin  F/u blood cultures  Lactate is since normalized  Monitor WBC count

## 2025-06-28 NOTE — SPEECH THERAPY NOTE
Speech-Language Pathology Bedside Swallow Evaluation        Patient Name: Ariella Shepherd    Today's Date: 6/28/2025     Problem List  Problem List[1]    Past Medical History  Past Medical History[2]    Past Surgical History  Past Surgical History[3]      Current Medical Status  Ariella Shepherd is a 79 y.o. female with PMH of HTN, DM who initally presented with fevers, chills and bodyaches that began  yesterday. She otherwise denies any other complaints. Denies cp, sob, cough, abdominal , nausea, vomiting, dysura.     Pt with bolus holding/pocketing of meds and water yesterday. She was subsequently made NPO with SLP consult requested to further assess. The patient is known to us from prior admission in 2020 during which was was reported to be more altered and refusing po. She was placed on a diet of pure and nectar thick liquids at that time. Family at bedside report she has since advanced back to a regular diet with thin liquids. Fair-good appetite reported but admit some difficulty with self feeding, occ expectoration/sxs of texture aversion as well as difficulty with rice. No reported recent weight loss or prior PNA.     Past medical history:   Please see H&P for details    Special Studies:  CXR 6/27/25: Consolidation in the right lung base compatible with pneumonia. Suggest follow-up to ensure resolution after treatment     Social/Education/Vocational Hx:  Pt lives with family    Swallow Information   Current Risks for Dysphagia & Aspiration: known history of dysphagia, AMS, and change in respiratory status     Current Symptoms/Concerns: pocketing food and change in respiratory status    Current Diet: NPO      Baseline Diet: regular diet and thin liquids ( although family admits to avoiding some foods)      Baseline Assessment   Behavior/Cognition: alert    Speech/Language Status: able to participate in basic conversation and able to follow commands    Patient Positioning: upright in bed      Swallow  Mechanism Exam   Facial: symmetrical  Labial: WFL  Lingual: WFL  Velum: symmetrical  Mandible: adequate ROM  Dentition: adequate and partial dentures  Vocal quality:mildly dysphonic c/w age/gender- no recent changes per family   Volitional Cough: unable to initiate volitional cough   Resp: RA    Consistencies Assessed and Performance   Consistencies Administered: thin liquids, puree, soft solids, hard solids, and mixed consistency  Specific materials administered included: applesauce, katerine cracker, raisin bran in thin milk    Oral Stage:   Bolus retrieval and labial seal appeared adequate. Mastication was adequate with the materials administered today. Bolus formation and transfer were slow and disorganized at times but functional with no significant oral residue noted. Expectoration of regular solids noted ( raisins). No overt s/s reduced oral control.    Pharyngeal Stage:   Swallowing initiation appeared prompt.  Laryngeal rise was palpated and judged to be within functional limits.  No coughing, throat clearing, change in vocal quality or respiratory status noted today.     Esophageal Concerns: none reported    Summary   s/s suggestive of mild-moderate oral dysphagia c/w dementia presentation. Education provided to the family on results and prognosis as well as risks, sxs to be mindful of and recommendations/POC. Reciprocal comprehension was verbally expressed.     Recommendations: soft/level 3 diet and thin liquids     Recommended Form of Meds: as tolerated ( family reports typically she takes them whole with water without difficulty- if holding/pocketing continues consider crushing)     Aspiration precautions and compensatory swallowing strategies: upright posture, only feed when fully alert, slow rate of feeding, small bites/sips, and alternating bites and sips    Results Reviewed with: patient, RN, MD, and family     Dysphagia Goals: pt will tolerate dysphagia 3 with thin liquids without s/s of aspiration  x3    Plan  Will f/u    Hannah Rodríguez M.S., CCC-SLP  Speech Language Pathologist   Available via Secure Chat  [place] #[id number]  [place] #[id number]         [1]   Patient Active Problem List  Diagnosis    DM (diabetes mellitus) (HCC)    HTN (hypertension)    JULEE (acute kidney injury) (HCC)    Abnormal finding on CT scan    AMS (altered mental status)    COVID-19 virus detected    Hypernatremia    Severe protein-calorie malnutrition (HCC)    Tooth abscess    Sepsis (HCC)   [2]   Past Medical History:  Diagnosis Date    Cancer (HCC)     BREAST    Diabetes mellitus (HCC)     Hypertension    [3]   Past Surgical History:  Procedure Laterality Date    BREAST SURGERY

## 2025-06-28 NOTE — PLAN OF CARE
Problem: PAIN - ADULT  Goal: Verbalizes/displays adequate comfort level or baseline comfort level  Description: Interventions:  - Encourage patient to monitor pain and request assistance  - Assess pain using appropriate pain scale  - Administer analgesics as ordered based on type and severity of pain and evaluate response  - Implement non-pharmacological measures as appropriate and evaluate response  - Consider cultural and social influences on pain and pain management  - Notify physician/advanced practitioner if interventions unsuccessful or patient reports new pain  - Educate patient/family on pain management process including their role and importance of  reporting pain   - Provide non-pharmacologic/complimentary pain relief interventions  Outcome: Progressing     Problem: INFECTION - ADULT  Goal: Absence or prevention of progression during hospitalization  Description: INTERVENTIONS:  - Assess and monitor for signs and symptoms of infection  - Monitor lab/diagnostic results  - Monitor all insertion sites, i.e. indwelling lines, tubes, and drains  - Monitor endotracheal if appropriate and nasal secretions for changes in amount and color  - [place] appropriate cooling/warming therapies per order  - Administer medications as ordered  - Instruct and encourage patient and family to use good hand hygiene technique  - Identify and instruct in appropriate isolation precautions for identified infection/condition  Outcome: Progressing  Goal: Absence of fever/infection during neutropenic period  Description: INTERVENTIONS:  - Monitor WBC  - Perform strict hand hygiene  - Limit to healthy visitors only  - No plants, dried, fresh or silk flowers with osborn in patient room  Outcome: Progressing     Problem: Knowledge Deficit  Goal: Patient/family/caregiver demonstrates understanding of disease process, treatment plan, medications, and discharge instructions  Description: Complete learning assessment and assess knowledge  base.  Interventions:  - Provide teaching at level of understanding  - Provide teaching via preferred learning methods  Outcome: Progressing

## 2025-06-28 NOTE — ASSESSMENT & PLAN NOTE
Lab Results   Component Value Date    HGBA1C 8.3 (H) 04/15/2020       Recent Labs     06/27/25  1903 06/27/25  2109 06/28/25  0630   POCGLU 302* 232* 190*       Blood Sugar Average: Last 72 hrs:  (P) [id number] PO agents  SSI  Monitor BG

## 2025-06-28 NOTE — UTILIZATION REVIEW
Initial Clinical Review - OBS 6/27/25, Inpatient 6/28/25 for continued treatment of sepsis with IV antibiotics.     Admission: Date/Time/Statement:   Admission Orders (From admission, onward)       Ordered        06/28/25 1243  INPATIENT ADMISSION  Once            06/27/25 1755  Place in Observation  Once                          Orders Placed This Encounter   Procedures    INPATIENT ADMISSION     Standing Status:   Standing     Number of Occurrences:   1     Level of Care:   Med Surg [16]     Estimated length of stay:   More than 2 Midnights     Certification:   I certify that inpatient services are medically necessary for this patient for a duration of greater than two midnights. See H&P and MD Progress Notes for additional information about the patient's course of treatment.     ED Arrival Information       Expected   -    Arrival   6/27/2025 16:13    Acuity   Urgent              Means of arrival   Wheelchair    Escorted by   Family Member    Service   Hospitalist    Admission type   Emergency              Arrival complaint   Flu Like Symptoms             Chief Complaint   Patient presents with    Flu Symptoms     Pt c/o fever, chills and body aches that started yesterday        Initial Presentation: 79 y.o. female with PMH of HTN, DM who initally presented with fevers, chills and bodyaches that began  yesterday. Fever of 100.9 on arrival to the ED.  ED CXR revealed consolidation in the right lung base compatible with pneumonia. Abnormal labs:  Lactic acid 2.6, procalcitonin 9.5. Creatinine 1.36 (baseline 1), elevated glucose of 344.  Patient received IVF and SC insulin in the ED.  Exam:  Tachycardia. Normal breath sounds.     6/27 Admit to Observation for evaluation and treatment of sepsis, suspect possibly from RLL pneumonia, DM, JULEE:  Ceftriaxone and azithromycin, follow blood cultures, trend lactic acid.  Hold PO agents, SSI, monitor blood glucose.  IVF, repeat BMP in AM.     6/27 11:51 PM: RN reported that  pt keeping saliva in her mouth and given signs of choking/aspiration risk. Will keep the pt NPO with SLP consult.     6/28 Internal Medicine: Lactate normalized, continue antibiotics,  follow up blood cultures, monitor WBC. BP controlled, holding HCTZ, ARB in setting of JULEE.  Creatinine improving, continue IVF for now. Certification Statement: The patient will continue to require additional inpatient hospital stay due to requiring IV antibiotics. Speech Bedside Swallow eval:  s/s suggestive of mild-moderate oral dysphagia c/w dementia presentation. Recommendation:  soft/level 3 diet and thin liquids. Medications as tolerated.     6/29 Day 3: Has surpassed a 2nd midnight with active treatments and services. Internal Medicine: Clinically improving, however, still with intermittent fever. WBC WNL, blood cultures negative thus far. Continue ceftriaxone and azithromycin for now, if continues to improve, possible transition to PO antibiotics tomorrow.  JULEE resolved with IVF, encourage oral hydration.       ED Treatment-Medication Administration from 06/27/2025 1613 to 06/27/2025 1947         Date/Time Order Dose Route Action     06/27/2025 1711 sodium chloride 0.9 % bolus 1,000 mL 1,000 mL Intravenous New Bag     06/27/2025 1926 insulin lispro (HumALOG/ADMELOG) 100 units/mL subcutaneous injection 1-5 Units 3 Units Subcutaneous Given            Scheduled Medications:      azithromycin, 500 mg, Intravenous, Q24H  cefTRIAXone, 1,000 mg, Intravenous, Q24H  heparin (porcine), 5,000 Units, Subcutaneous, Q8H SNOW  insulin lispro, 1-5 Units, Subcutaneous, TID AC  insulin lispro, 1-5 Units, Subcutaneous, HS  pravastatin, 40 mg, Oral, Daily With Dinner      Continuous IV Infusions:    sodium chloride, 75 mL/hr, Intravenous, Continuous      PRN Meds:    acetaminophen, 650 mg, Oral, Q6H PRN  benzonatate, 100 mg, Oral, TID PRN      ED Triage Vitals   Temperature Pulse Respirations Blood Pressure SpO2 Pain Score   06/27/25 1618 06/27/25  1618 06/27/25 1618 06/27/25 1618 06/27/25 1618 06/27/25 2300   (!) 100.9 °F (38.3 °C) 94 20 160/75 99 % No Pain     Weight (last 2 days)       Date/Time Weight    06/27/25 2300 55 (121.25)    06/27/25 2000 55 (121.25)    06/27/25 1943 54.4 (120)            Vital Signs (last 3 days)       Date/Time Temp Pulse Resp BP MAP (mmHg) SpO2 O2 Device Patient Position - Orthostatic VS [place]ue Coma Scale Score Pain    06/29/25 1107 -- -- -- -- -- -- -- -- -- No Pain    06/29/25 07:25:41 100.5 °F (38.1 °C) 82 -- 141/66 91 94 % -- -- -- --    06/29/25 02:10:48 100.3 °F (37.9 °C) -- -- -- -- -- -- -- -- --    06/28/25 21:41:53 98.1 °F (36.7 °C) 81 -- 134/71 92 94 % -- -- -- --    06/28/25 1947 -- -- -- -- -- -- None (Room air) -- 13 No Pain    06/28/25 1626 -- -- -- -- -- -- -- -- -- Med Not Given for Pain - for MAR use only    06/28/25 15:44:55 102.8 °F (39.3 °C) 94 17 -- -- 95 % -- -- -- --    06/28/25 15:07:45 102.7 °F (39.3 °C) 95 17 133/65 88 95 % -- -- -- --    06/28/25 1004 -- -- -- -- -- -- -- -- 13 No Pain    06/28/25 08:25:54 -- 92 16 129/57 81 95 % -- -- -- --    06/28/25 0300 99.5 °F (37.5 °C) -- -- -- -- -- -- -- -- --    06/27/25 2300 103.2 °F (39.6 °C) 88 18 151/86 -- -- None (Room air) -- 13 No Pain    06/27/25 2000 103.2 °F (39.6 °C) -- 18 -- -- -- None (Room air) Lying -- --    06/27/25 1920 -- -- -- -- -- -- -- -- 13 --    06/27/25 1715 -- 96 18 183/86 124 95 % None (Room air) Lying -- --    06/27/25 1630 -- 103 18 137/70 100 93 % None (Room air) Lying -- --    06/27/25 1618 100.9 °F (38.3 °C) 94 20 160/75 108 99 % None (Room air) Sitting -- --              Pertinent Labs/Diagnostic Test Results:   Radiology:  XR chest pa & lateral   Final Interpretation by Kg Montanez MD (06/27 6618)      Consolidation in the right lung base compatible with pneumonia. Suggest follow-up to ensure resolution after treatment      The study was marked in EPIC for immediate notification.            Workstation  performed: [id number]           Cardiology:  ECG 12 lead   Final Result by Brandon Prieto MD (06/28 0951)   Normal sinus rhythm   Moderate voltage criteria for LVH, may be normal variant   When compared with ECG of 27-Jun-2025 17:14, (unconfirmed)   No significant change was found   Confirmed by Brandon Prieto ([id number]) on 6/28/2025 9:51:46 AM      ECG 12 lead   Final Result by Brandon Prieto MD (06/28 0954)   Normal sinus rhythm   Right atrial enlargement   Borderline ECG   Confirmed by Brandon Prieto ([id number]) on 6/28/2025 9:54:46 AM              Results from last 7 days   Lab Units 06/28/25  0932   SARS-COV-2  Negative     Results from last 7 days   Lab Units 06/29/25  0502 06/28/25  0558 06/27/25  1707   WBC Thousand/uL 6.23 7.90 8.87   HEMOGLOBIN g/dL 8.1* 9.5* 10.3*   HEMATOCRIT % 24.8* 29.8* 32.6*   PLATELETS Thousands/uL 143* 146* 151   TOTAL NEUT ABS Thousands/µL 4.69 6.69 7.51         Results from last 7 days   Lab Units 06/29/25  0502 06/28/25  0558 06/27/25  1707   SODIUM mmol/L 139 137 132*   POTASSIUM mmol/L 3.6 3.8 4.3   CHLORIDE mmol/L 108 102 96   CO2 mmol/L 21 22 23   ANION GAP mmol/L 10 13 13   BUN mg/dL 20 21 23   CREATININE mg/dL 1.10 1.25 1.36*   EGFR ml/min/1.73sq m 48 41 37   CALCIUM mg/dL 8.3* 8.9 9.3     Results from last 7 days   Lab Units 06/27/25  1707   AST U/L 44*   ALT U/L 19   ALK PHOS U/L 111*   TOTAL PROTEIN g/dL 8.0   ALBUMIN g/dL 3.6   TOTAL BILIRUBIN mg/dL 0.55     Results from last 7 days   Lab Units 06/29/25  1056 06/29/25  0638 06/28/25  2151 06/28/25  1543 06/28/25  1206 06/28/25  0630 06/27/25  2109 06/27/25  1903   POC GLUCOSE mg/dl 301* 223* 254* 308* 203* 190* 232* 302*     Results from last 7 days   Lab Units 06/29/25  0502 06/28/25  0558 06/27/25  1707   GLUCOSE RANDOM mg/dL 232* 191* 344*               Results from last 7 days   Lab Units 06/27/25  2200 06/27/25  1942 06/27/25  1707   HS TNI 0HR ng/L  --   --  35   HS TNI 2HR ng/L  --  48  --    HSTNI D2 ng/L  --  13   --    HS TNI 4HR ng/L 70*  --   --    HSTNI D4 ng/L 35*  --   --          Results from last 7 days   Lab Units 06/27/25  1707   PROTIME seconds 15.0   INR  1.11   PTT seconds 29         Results from last 7 days   Lab Units 06/28/25  0558 06/27/25  1707   PROCALCITONIN ng/ml 16.40* 9.50*     Results from last 7 days   Lab Units 06/27/25  1942 06/27/25  1707   LACTIC ACID mmol/L 1.2 2.6*                   Results from last 7 days   Lab Units 06/28/25  0542   CLARITY UA  Clear   COLOR UA  Yellow   SPEC GRAV UA  1.022   PH UA  6.0   GLUCOSE UA mg/dl 500 (1/2%)*   KETONES UA mg/dl 20 (1+)*   BLOOD UA  Large*   PROTEIN UA mg/dl 200 (2+)*   NITRITE UA  Negative   BILIRUBIN UA  Negative   UROBILINOGEN UA (BE) mg/dl <2.0   LEUKOCYTES UA  Negative   WBC UA /hpf 4-10*   RBC UA /hpf 1-2   BACTERIA UA /hpf Occasional   EPITHELIAL CELLS WET PREP /hpf Occasional     Results from last 7 days   Lab Units 06/28/25  0932   INFLUENZA A PCR  Negative   INFLUENZA B PCR  Negative   RSV PCR  Negative               Results from last 7 days   Lab Units 06/27/25  1850 06/27/25  1840   BLOOD CULTURE  No Growth at 24 hrs. No Growth at 24 hrs.                   Past Medical History[1]  Present on Admission:   JULEE (acute kidney injury) (HCC)   DM (diabetes mellitus) (HCC)   HTN (hypertension)   Pneumonia      Admitting Diagnosis: Pneumonia [J18.9]  Flu-like symptoms [R68.89]  Age/Sex: 79 y.o. female    Network Utilization Review Department  ATTENTION: Please call with any questions or concerns to [phone] and carefully listen to the prompts so that you are directed to the right person. All voicemails are confidential.   For Discharge needs, contact Care Management DC Support Team at [phone] opt. [phone]  Send all requests for admission clinical reviews, approved or denied determinations and any other requests to dedicated fax number below belonging to the campus where the patient is receiving treatment. List of dedicated fax numbers for the  Facilities:  FACILITY NAME UR FAX NUMBER   ADMISSION DENIALS (Administrative/Medical Necessity) [phone]   DISCHARGE SUPPORT TEAM (NETWORK) [phone]   PARENT CHILD HEALTH (Maternity/NICU/Pediatrics) [phone]   [facility] [phone]   [facility] [phone]   [facility] [phone]   [facility] [phone]   Critical access hospital [phone]   [facility] [phone]   [facility] [phone]   [facility] [phone]   [facility] [phone]   [facility] [phone]   [facility] [phone]   [facility] [place] [phone]              [1]   Past Medical History:  Diagnosis Date    Cancer (HCC)     BREAST    Diabetes mellitus (HCC)     Hypertension

## 2025-06-28 NOTE — PLAN OF CARE
Problem: PAIN - ADULT  Goal: Verbalizes/displays adequate comfort level or baseline comfort level  Description: Interventions:  - Encourage patient to monitor pain and request assistance  - Assess pain using appropriate pain scale  - Administer analgesics as ordered based on type and severity of pain and evaluate response  - Implement non-pharmacological measures as appropriate and evaluate response  - Consider cultural and social influences on pain and pain management  - Notify physician/advanced practitioner if interventions unsuccessful or patient reports new pain  - Educate patient/family on pain management process including their role and importance of  reporting pain   - Provide non-pharmacologic/complimentary pain relief interventions  Outcome: Progressing     Problem: INFECTION - ADULT  Goal: Absence or prevention of progression during hospitalization  Description: INTERVENTIONS:  - Assess and monitor for signs and symptoms of infection  - Monitor lab/diagnostic results  - Monitor all insertion sites, i.e. indwelling lines, tubes, and drains  - Monitor endotracheal if appropriate and nasal secretions for changes in amount and color  - [place] appropriate cooling/warming therapies per order  - Administer medications as ordered  - Instruct and encourage patient and family to use good hand hygiene technique  - Identify and instruct in appropriate isolation precautions for identified infection/condition  Outcome: Progressing  Goal: Absence of fever/infection during neutropenic period  Description: INTERVENTIONS:  - Monitor WBC  - Perform strict hand hygiene  - Limit to healthy visitors only  - No plants, dried, fresh or silk flowers with osborn in patient room  Outcome: Progressing     Problem: SAFETY ADULT  Goal: Patient will remain free of falls  Description: INTERVENTIONS:  - Educate patient/family on patient safety including physical limitations  - Instruct patient to call for assistance with activity   -  Consider consulting OT/PT to assist with strengthening/mobility based on AM PAC & JH-HLM score  - Consult OT/PT to assist with strengthening/mobility   - Keep Call bell within reach  - Keep bed low and locked with side rails adjusted as appropriate  - Keep care items and personal belongings within reach  - Initiate and maintain comfort rounds  - Make Fall Risk Sign visible to staff  - Offer Toileting every 2 Hours, in advance of need  - Initiate/Maintain bed alarm  - Obtain necessary fall risk management equipment: alarm  - Apply yellow socks and bracelet for high fall risk patients  - Consider moving patient to room near nurses station  Outcome: Progressing  Goal: Maintain or return to baseline ADL function  Description: INTERVENTIONS:  -  Assess patient's ability to carry out ADLs; assess patient's baseline for ADL function and identify physical deficits which impact ability to perform ADLs (bathing, care of mouth/teeth, toileting, grooming, dressing, etc.)  - Assess/evaluate cause of self-care deficits   - Assess range of motion  - Assess patient's mobility; develop plan if impaired  - Assess patient's need for assistive devices and provide as appropriate  - Encourage maximum independence but intervene and supervise when necessary  - Involve family in performance of ADLs  - Assess for home care needs following discharge   - Consider OT consult to assist with ADL evaluation and planning for discharge  - Provide patient education as appropriate  - Monitor functional capacity and physical performance, use of AM PAC & JH-HLM   - Monitor gait, balance and fatigue with ambulation    Outcome: Progressing  Goal: Maintains/Returns to pre admission functional level  Description: INTERVENTIONS:  - Perform AM-PAC 6 Click Basic Mobility/ Daily Activity assessment daily.  - Set and communicate daily mobility goal to care team and patient/family/caregiver.   - Collaborate with rehabilitation services on mobility goals if  consulted  - Perform Range of Motion 4 times a day.  - Reposition patient every 2 hours.  - Dangle patient 3 times a day  - Stand patient 3 times a day  - Ambulate patient 3 times a day  - Out of bed to chair 3 times a day   - Out of bed for meals 3  Problem: DISCHARGE PLANNING  Goal: Discharge to home or other facility with appropriate resources  Description: INTERVENTIONS:  - Identify barriers to discharge w/patient and caregiver  - Arrange for needed discharge resources and transportation as appropriate  - Identify discharge learning needs (meds, wound care, etc.)  - Arrange for interpretive services to assist at discharge as needed  - Refer to Case Management Department for coordinating discharge planning if the patient needs post-hospital services based on physician/advanced practitioner order or complex needs related to functional status, cognitive ability, or social support system  Outcome: Progressing     Problem: Knowledge Deficit  Goal: Patient/family/caregiver demonstrates understanding of disease process, treatment plan, medications, and discharge instructions  Description: Complete learning assessment and assess knowledge base.  Interventions:  - Provide teaching at level of understanding  - Provide teaching via preferred learning methods  Outcome: Progressing    times a day  - Out of bed for toileting  - Record patient progress and toleration of activity level   Outcome: Progressing

## 2025-06-28 NOTE — RESPIRATORY THERAPY NOTE
"RT Protocol Note  Ariella Shepherd 79 y.o. female MRN: [id number]  Unit/Bed#: -01 Encounter: [id number]    Assessment    Principal Problem:    Sepsis (HCC)  Active Problems:    DM (diabetes mellitus) (HCC)    HTN (hypertension)    JULEE (acute kidney injury) (HCC)      Home Pulmonary Medications:  none       Past Medical History[1]  Social History[2]    Subjective         Objective    Physical Exam:   Assessment Type: Assess only  General Appearance: Awake, Alert  Respiratory Pattern: Normal  Chest Assessment: Chest expansion symmetrical  Bilateral Breath Sounds: Diminished    Vitals:  Blood pressure 151/86, pulse 88, temperature 99.5 °F (37.5 °C), resp. rate 18, height 5' 2\" (1.575 m), weight 55 kg (121 lb 4.1 oz), SpO2 95%.          Imaging and other studies:           Plan    Respiratory Plan: No distress/Pulmonary history, Discontinue Protocol        Resp Comments: pt presented with flu like symptoms, no resp hx or meds, bs diminished, no wheezing, no indication for neb tx        [1]   Past Medical History:  Diagnosis Date    Cancer (HCC)     BREAST    Diabetes mellitus (HCC)     Hypertension    [2]   Social History  Socioeconomic History    Marital status:    Tobacco Use    Smoking status: Never     Passive exposure: Never    Smokeless tobacco: Never   Vaping Use    Vaping status: Never Used   Substance and Sexual Activity    Alcohol use: Not Currently    Drug use: Never    Sexual activity: Not Currently     Social Drivers of Health     Food Insecurity: Patient Unable To Answer (6/27/2025)    Nursing - Inadequate Food Risk Classification     Ran Out of Food in the Last Year: Patient unable to answer   Transportation Needs: Patient Unable To Answer (6/27/2025)    Nursing - Transportation Risk Classification     Lack of Transportation: Patient unable to answer   Intimate Partner Violence: Patient Unable To Answer (6/27/2025)    Nursing IPS     Physically Hurt by Someone: Patient unable to answer     Hurt " or Threatened by Someone: Patient unable to answer   Housing Stability: Patient Unable To Answer (6/27/2025)    Nursing: Inadequate Housing Risk Classification     Unable to Pay for Housing in the Last Year: Patient unable to answer     Has Housing: Patient unable to answer

## 2025-06-28 NOTE — ASSESSMENT & PLAN NOTE
Baseline cr 1  Creatinine noted to be 1.36  Suspect from fever/sepsis  Creatinine improving  P.o. intake slowly improving  Will continue IV fluids for now

## 2025-06-28 NOTE — PLAN OF CARE
Problem: PAIN - ADULT  Goal: Verbalizes/displays adequate comfort level or baseline comfort level  Description: Interventions:  - Encourage patient to monitor pain and request assistance  - Assess pain using appropriate pain scale  - Administer analgesics as ordered based on type and severity of pain and evaluate response  - Implement non-pharmacological measures as appropriate and evaluate response  - Consider cultural and social influences on pain and pain management  - Notify physician/advanced practitioner if interventions unsuccessful or patient reports new pain  - Educate patient/family on pain management process including their role and importance of  reporting pain   - Provide non-pharmacologic/complimentary pain relief interventions  Outcome: Progressing     Problem: INFECTION - ADULT  Goal: Absence or prevention of progression during hospitalization  Description: INTERVENTIONS:  - Assess and monitor for signs and symptoms of infection  - Monitor lab/diagnostic results  - Monitor all insertion sites, i.e. indwelling lines, tubes, and drains  - Monitor endotracheal if appropriate and nasal secretions for changes in amount and color  - [place] appropriate cooling/warming therapies per order  - Administer medications as ordered  - Instruct and encourage patient and family to use good hand hygiene technique  - Identify and instruct in appropriate isolation precautions for identified infection/condition  Outcome: Progressing  Goal: Absence of fever/infection during neutropenic period  Description: INTERVENTIONS:  - Monitor WBC  - Perform strict hand hygiene  - Limit to healthy visitors only  - No plants, dried, fresh or silk flowers with osborn in patient room  Outcome: Progressing     Problem: Knowledge Deficit  Goal: Patient/family/caregiver demonstrates understanding of disease process, treatment plan, medications, and discharge instructions  Description: Complete learning assessment and assess knowledge  base.  Interventions:  - Provide teaching at level of understanding  - Provide teaching via preferred learning methods  Outcome: Progressing     Problem: Nutrition/Hydration-ADULT  Goal: Nutrient/Hydration intake appropriate for improving, restoring or maintaining nutritional needs  Description: Monitor and assess patient's nutrition/hydration status for malnutrition. Collaborate with interdisciplinary team and initiate plan and interventions as ordered.  Monitor patient's weight and dietary intake as ordered or per policy. Utilize nutrition screening tool and intervene as necessary. Determine patient's food preferences and provide high-protein, high-caloric foods as appropriate.     INTERVENTIONS:  - Monitor oral intake, urinary output, labs, and treatment plans  - Assess nutrition and hydration status and recommend course of action  - Evaluate amount of meals eaten  - Assist patient with eating if necessary   - Allow adequate time for meals  - Recommend/ encourage appropriate diets, oral nutritional supplements, and vitamin/mineral supplements  - Order, calculate, and assess calorie counts as needed  - Recommend, monitor, and adjust tube feedings and TPN/PPN based on assessed needs  - Assess need for intravenous fluids  - Provide specific nutrition/hydration education as appropriate  - Include patient/family/caregiver in decisions related to nutrition  Outcome: Progressing

## 2025-06-29 PROBLEM — G93.40 ACUTE ENCEPHALOPATHY: Status: ACTIVE | Noted: 2025-06-29

## 2025-06-29 PROBLEM — J18.9 PNEUMONIA: Status: ACTIVE | Noted: 2020-04-14

## 2025-06-29 LAB
ANION GAP SERPL CALCULATED.3IONS-SCNC: 10 MMOL/L (ref 4–13)
BASOPHILS # BLD AUTO: 0.02 THOUSANDS/ÂΜL (ref 0–0.1)
BASOPHILS NFR BLD AUTO: 0 % (ref 0–1)
BUN SERPL-MCNC: 20 MG/DL (ref 5–25)
CALCIUM SERPL-MCNC: 8.3 MG/DL (ref 8.4–10.2)
CHLORIDE SERPL-SCNC: 108 MMOL/L (ref 96–108)
CO2 SERPL-SCNC: 21 MMOL/L (ref 21–32)
CREAT SERPL-MCNC: 1.1 MG/DL (ref 0.6–1.3)
EOSINOPHIL # BLD AUTO: 0.19 THOUSAND/ÂΜL (ref 0–0.61)
EOSINOPHIL NFR BLD AUTO: 3 % (ref 0–6)
ERYTHROCYTE [DISTWIDTH] IN BLOOD BY AUTOMATED COUNT: 13.8 % (ref 11.6–15.1)
GFR SERPL CREATININE-BSD FRML MDRD: 48 ML/MIN/1.73SQ M
GLUCOSE SERPL-MCNC: 223 MG/DL (ref 65–140)
GLUCOSE SERPL-MCNC: 232 MG/DL (ref 65–140)
GLUCOSE SERPL-MCNC: 252 MG/DL (ref 65–140)
GLUCOSE SERPL-MCNC: 301 MG/DL (ref 65–140)
GLUCOSE SERPL-MCNC: 329 MG/DL (ref 65–140)
HCT VFR BLD AUTO: 24.8 % (ref 34.8–46.1)
HGB BLD-MCNC: 8.1 G/DL (ref 11.5–15.4)
IMM GRANULOCYTES # BLD AUTO: 0.06 THOUSAND/UL (ref 0–0.2)
IMM GRANULOCYTES NFR BLD AUTO: 1 % (ref 0–2)
LYMPHOCYTES # BLD AUTO: 0.83 THOUSANDS/ÂΜL (ref 0.6–4.47)
LYMPHOCYTES NFR BLD AUTO: 13 % (ref 14–44)
MCH RBC QN AUTO: 28.4 PG (ref 26.8–34.3)
MCHC RBC AUTO-ENTMCNC: 32.7 G/DL (ref 31.4–37.4)
MCV RBC AUTO: 87 FL (ref 82–98)
MONOCYTES # BLD AUTO: 0.44 THOUSAND/ÂΜL (ref 0.17–1.22)
MONOCYTES NFR BLD AUTO: 7 % (ref 4–12)
NEUTROPHILS # BLD AUTO: 4.69 THOUSANDS/ÂΜL (ref 1.85–7.62)
NEUTS SEG NFR BLD AUTO: 76 % (ref 43–75)
NRBC BLD AUTO-RTO: 0 /100 WBCS
PLATELET # BLD AUTO: 143 THOUSANDS/UL (ref 149–390)
PMV BLD AUTO: 11.3 FL (ref 8.9–12.7)
POTASSIUM SERPL-SCNC: 3.6 MMOL/L (ref 3.5–5.3)
RBC # BLD AUTO: 2.85 MILLION/UL (ref 3.81–5.12)
SODIUM SERPL-SCNC: 139 MMOL/L (ref 135–147)
WBC # BLD AUTO: 6.23 THOUSAND/UL (ref 4.31–10.16)

## 2025-06-29 PROCEDURE — 85025 COMPLETE CBC W/AUTO DIFF WBC: CPT | Performed by: INTERNAL MEDICINE

## 2025-06-29 PROCEDURE — 80048 BASIC METABOLIC PNL TOTAL CA: CPT | Performed by: INTERNAL MEDICINE

## 2025-06-29 PROCEDURE — 82948 REAGENT STRIP/BLOOD GLUCOSE: CPT

## 2025-06-29 PROCEDURE — 99232 SBSQ HOSP IP/OBS MODERATE 35: CPT | Performed by: INTERNAL MEDICINE

## 2025-06-29 RX ADMIN — CEFTRIAXONE SODIUM 1000 MG: 10 INJECTION, POWDER, FOR SOLUTION INTRAVENOUS at 17:20

## 2025-06-29 RX ADMIN — INSULIN LISPRO 3 UNITS: 100 INJECTION, SOLUTION INTRAVENOUS; SUBCUTANEOUS at 11:07

## 2025-06-29 RX ADMIN — SODIUM CHLORIDE 75 ML/HR: 0.9 INJECTION, SOLUTION INTRAVENOUS at 04:05

## 2025-06-29 RX ADMIN — PRAVASTATIN SODIUM 40 MG: 40 TABLET ORAL at 17:20

## 2025-06-29 RX ADMIN — ACETAMINOPHEN 650 MG: 325 TABLET ORAL at 23:19

## 2025-06-29 RX ADMIN — AZITHROMYCIN MONOHYDRATE 500 MG: 500 INJECTION, POWDER, LYOPHILIZED, FOR SOLUTION INTRAVENOUS at 20:06

## 2025-06-29 RX ADMIN — INSULIN LISPRO 3 UNITS: 100 INJECTION, SOLUTION INTRAVENOUS; SUBCUTANEOUS at 17:26

## 2025-06-29 RX ADMIN — HEPARIN SODIUM 5000 UNITS: 5000 INJECTION INTRAVENOUS; SUBCUTANEOUS at 17:20

## 2025-06-29 RX ADMIN — HEPARIN SODIUM 5000 UNITS: 5000 INJECTION INTRAVENOUS; SUBCUTANEOUS at 05:04

## 2025-06-29 RX ADMIN — ACETAMINOPHEN 650 MG: 325 TABLET ORAL at 11:07

## 2025-06-29 RX ADMIN — INSULIN LISPRO 2 UNITS: 100 INJECTION, SOLUTION INTRAVENOUS; SUBCUTANEOUS at 08:18

## 2025-06-29 RX ADMIN — HEPARIN SODIUM 5000 UNITS: 5000 INJECTION INTRAVENOUS; SUBCUTANEOUS at 22:13

## 2025-06-29 RX ADMIN — INSULIN LISPRO 2 UNITS: 100 INJECTION, SOLUTION INTRAVENOUS; SUBCUTANEOUS at 22:14

## 2025-06-29 NOTE — ASSESSMENT & PLAN NOTE
Baseline cr 1  Creatinine noted to be 1.36  Suspect from fever/sepsis  Has since resolved with IV fluids  Encourage oral hydration

## 2025-06-29 NOTE — ASSESSMENT & PLAN NOTE
Presented with fever 100.9, procal 9, tachycardia, elevated lactate of 2.3 suspect possibly from RLL PNA as noted on CXR  Continue ceftriaxone and azithromycin  Clinically improving however still with intermittent fevers  WBC count within normal limits  Blood cultures thus far negative  Continue ceftriaxone azithromycin for now  Lactate is since normalized  Monitor WBC count

## 2025-06-29 NOTE — PROGRESS NOTES
Progress Note - Hospitalist   Name: Ariella Shepherd 79 y.o. female I MRN: [id number]  Unit/Bed#: -01 I Date of Admission: 6/27/2025   Date of Service: 6/29/2025 I Hospital Day: 1    Assessment & Plan  Sepsis (Formerly [facility])  Presented with fever 100.9, procal 9, tachycardia, elevated lactate of 2.3 suspect possibly from RLL PNA as noted on CXR  Continue ceftriaxone and azithromycin  Clinically improving however still with intermittent fevers  WBC count within normal limits  Blood cultures thus far negative  Continue ceftriaxone azithromycin for now  Lactate is since normalized  Monitor WBC count  DM (diabetes mellitus) (Formerly [facility])  Lab Results   Component Value Date    HGBA1C 8.3 (H) 04/15/2020       Recent Labs     06/28/25  1543 06/28/25  2151 06/29/25  0638 06/29/25  1056   POCGLU 308* 254* 223* 301*       Blood Sugar Average: Last 72 hrs:  (P) 251.625hold PO agents  SSI  Monitor BG  HTN (hypertension)  Bp controlled  Hold hctz, arb in setting of julee  Hydrlazine as needed  JULEE (acute kidney injury) (Formerly [facility])  Baseline cr 1  Creatinine noted to be 1.36  Suspect from fever/sepsis  Has since resolved with IV fluids  Encourage oral hydration  Pneumonia  Noted to have right lower lobe pneumonia clinically improving on IV ceftriaxone azithromycin  Continue antibiotics as outlined above  If continues to improve possible transition to p.o. antibiotics tomorrow  Acute encephalopathy  Presented with lethargy, somnolence  Secondary to pneumonia/sepsis  Has since significantly improved with IV antibiotics now mental status back to baseline    VTE Pharmacologic Prophylaxis: VTE Score: 6 High Risk (Score >/= 5) - Pharmacological DVT Prophylaxis Ordered: heparin. Sequential Compression Devices Ordered.    Mobility:   Basic Mobility Inpatient Raw Score: 15  JH-HLM Goal: 4: Move to chair/commode  JH-HLM Achieved: 2: Bed activities/Dependent transfer  JH-HLM Goal achieved. Continue to encourage appropriate mobility.    Patient Centered Rounds: I  performed bedside rounds with nursing staff today.   Discussions with Specialists or Other Care Team Provider: cm, nursing    Education and Discussions with Family / Patient: Patient declined call to .     Current Length of Stay: 1 day(s)  Current Patient Status: Inpatient   Certification Statement: The patient will continue to require additional inpatient hospital stay due to see below  Discharge Plan: Still requiring IV antibiotics.  Hopeful discharge tomorrow if continues to clinically improve    Code Status: Level 1 - Full Code    Subjective   Denies chest pain, shortness of breath, abdominal pain, nausea, vomiting or any other complaints    Objective :  Temp:  [98.1 °F (36.7 °C)-102.8 °F (39.3 °C)] 100.5 °F (38.1 °C)  HR:  [81-95] 82  BP: (133-141)/(65-71) 141/66  Resp:  [17] 17  SpO2:  [94 %-95 %] 94 %  O2 Device: None (Room air)    Body mass index is 22.18 kg/m².     Input and Output Summary (last 24 hours):     Intake/Output Summary (Last 24 hours) at 6/29/2025 1058  Last data filed at 6/29/2025 0700  Gross per 24 hour   Intake 0 ml   Output --   Net 0 ml       Physical Exam  Constitutional:       General: She is not in acute distress.     Appearance: She is well-developed. She is not diaphoretic.   HENT:      Head: Normocephalic and atraumatic.      Nose: Nose normal.      Mouth/Throat:      Pharynx: No oropharyngeal exudate.     Eyes:      General: No scleral icterus.        Right eye: No discharge.         Left eye: No discharge.      Conjunctiva/sclera: Conjunctivae normal.     Neck:      Thyroid: No thyromegaly.      Vascular: No JVD.     Cardiovascular:      Rate and Rhythm: Normal rate and regular rhythm.      Heart sounds: Normal heart sounds. No murmur heard.     No friction rub. No gallop.   Pulmonary:      Effort: Pulmonary effort is normal. No respiratory distress.      Breath sounds: Normal breath sounds. No wheezing or rales.   Chest:      Chest wall: No tenderness.   Abdominal:       General: Bowel sounds are normal. There is no distension.      Palpations: Abdomen is soft.      Tenderness: There is no abdominal tenderness. There is no guarding or rebound.     Musculoskeletal:         General: No tenderness or deformity. Normal range of motion.      Cervical back: Normal range of motion and neck supple.     Skin:     General: Skin is warm and dry.      Findings: No erythema or rash.     Neurological:      Mental Status: She is alert. Mental status is at baseline.      Cranial Nerves: No cranial nerve deficit.      Sensory: No sensory deficit.      Motor: No abnormal muscle tone.      Coordination: Coordination normal.           Lines/Drains:              Lab Results: I have reviewed the following results:   Results from last 7 days   Lab Units 06/29/25  0502   WBC Thousand/uL 6.23   HEMOGLOBIN g/dL 8.1*   HEMATOCRIT % 24.8*   PLATELETS Thousands/uL 143*   SEGS PCT % 76*   LYMPHO PCT % 13*   MONO PCT % 7   EOS PCT % 3     Results from last 7 days   Lab Units 06/29/25  0502 06/28/25  0558 06/27/25  1707   SODIUM mmol/L 139   < > 132*   POTASSIUM mmol/L 3.6   < > 4.3   CHLORIDE mmol/L 108   < > 96   CO2 mmol/L 21   < > 23   BUN mg/dL 20   < > 23   CREATININE mg/dL 1.10   < > 1.36*   ANION GAP mmol/L 10   < > 13   CALCIUM mg/dL 8.3*   < > 9.3   ALBUMIN g/dL  --   --  3.6   TOTAL BILIRUBIN mg/dL  --   --  0.55   ALK PHOS U/L  --   --  111*   ALT U/L  --   --  19   AST U/L  --   --  44*   GLUCOSE RANDOM mg/dL 232*   < > 344*    < > = values in this interval not displayed.     Results from last 7 days   Lab Units 06/27/25  1707   INR  1.11     Results from last 7 days   Lab Units 06/29/25  1056 06/29/25  0638 06/28/25  2151 06/28/25  1543 06/28/25  1206 06/28/25  0630 06/27/25  2109 06/27/25  1903   POC GLUCOSE mg/dl 301* 223* 254* 308* 203* 190* 232* 302*         Results from last 7 days   Lab Units 06/28/25  0558 06/27/25  1942 06/27/25  1707   LACTIC ACID mmol/L  --  1.2 2.6*   PROCALCITONIN  ng/ml 16.40*  --  9.50*       Recent Cultures (last 7 days):   Results from last 7 days   Lab Units 06/27/25  1850 06/27/25  1840   BLOOD CULTURE  No Growth at 24 hrs. No Growth at 24 hrs.       Imaging Results Review: I personally reviewed the following image studies/reports in PACS and discussed pertinent findings with Radiology: chest xray. My interpretation of the radiology images/reports is:  .  Other Study Results Review: EKG was reviewed.     Last 24 Hours Medication List:     Current Facility-Administered Medications:     acetaminophen (TYLENOL) tablet 650 mg, Q6H PRN    azithromycin (ZITHROMAX) 500 mg in sodium chloride 0.9% 250mL IVPB 500 mg, Q24H    benzonatate (TESSALON PERLES) capsule 100 mg, TID PRN    ceftriaxone (ROCEPHIN) 1 g/50 mL in dextrose IVPB, Q24H, Last Rate: 1,000 mg (06/28/25 1815)    heparin (porcine) subcutaneous injection 5,000 Units, Q8H SNOW **AND** [CANCELED] Platelet count, Once    insulin lispro (HumALOG/ADMELOG) 100 units/mL subcutaneous injection 1-5 Units, TID AC **AND** Fingerstick Glucose (POCT), TID AC    insulin lispro (HumALOG/ADMELOG) 100 units/mL subcutaneous injection 1-5 Units, HS    pravastatin (PRAVACHOL) tablet 40 mg, Daily With Dinner    sodium chloride 0.9 % infusion, Continuous, Last Rate: 75 mL/hr (06/29/25 0405)    Administrative Statements   Today, Patient Was Seen By: Grant Machado MD  I have spent a total time of 30 minutes in caring for this patient on the day of the visit/encounter including Diagnostic results.    **Please Note: This note may have been constructed using a voice recognition system.**

## 2025-06-29 NOTE — UTILIZATION REVIEW
NOTIFICATION OF INPATIENT ADMISSION   AUTHORIZATION REQUEST   SERVICING FACILITY:   [place] [address]  Tax ID: [id number]  NPI: [id number] ATTENDING PROVIDER:  Attending Name and NPI#: Grant Machado Md [[id number]]  Address: [address] [place] [address]  Phone: [phone]     ADMISSION INFORMATION:  Place of Service: Inpatient [facility]  Place of Service Code: 21  Inpatient Admission Date/Time: 6/28/25 12:44 PM  Discharge Date/Time: No discharge date for patient encounter.  Admitting Diagnosis Code/Description:  Pneumonia [J18.9]  Flu-like symptoms [R68.89]     UTILIZATION REVIEW CONTACT:  Julianna Wong Utilization   Network Utilization Review Department  Phone: [phone]  Fax [phone]  Email: [email or web address]  Contact for approvals/pending authorizations, clinical reviews, and discharge.     PHYSICIAN ADVISORY SERVICES:  Medical Necessity Denial & Nhig-vh-Ymed Review  Phone: [phone]  Fax: [phone]  Email: [email or web address]     DISCHARGE SUPPORT TEAM:  For Patients Discharge Needs & Updates  Phone: [phone] opt. 2 Fax: [phone]  Email: [email or web address]

## 2025-06-29 NOTE — ASSESSMENT & PLAN NOTE
Presented with lethargy, somnolence  Secondary to pneumonia/sepsis  Has since significantly improved with IV antibiotics now mental status back to baseline

## 2025-06-29 NOTE — ASSESSMENT & PLAN NOTE
Lab Results   Component Value Date    HGBA1C 8.3 (H) 04/15/2020       Recent Labs     06/28/25  1543 06/28/25  2151 06/29/25  0638 06/29/25  1056   POCGLU 308* 254* 223* 301*       Blood Sugar Average: Last 72 hrs:  (P) 251.625hold PO agents  SSI  Monitor BG

## 2025-06-29 NOTE — ASSESSMENT & PLAN NOTE
Noted to have right lower lobe pneumonia clinically improving on IV ceftriaxone azithromycin  Continue antibiotics as outlined above  If continues to improve possible transition to p.o. antibiotics tomorrow

## 2025-06-30 LAB
ANION GAP SERPL CALCULATED.3IONS-SCNC: 10 MMOL/L (ref 4–13)
BASOPHILS # BLD AUTO: 0.02 THOUSANDS/ÂΜL (ref 0–0.1)
BASOPHILS NFR BLD AUTO: 1 % (ref 0–1)
BUN SERPL-MCNC: 18 MG/DL (ref 5–25)
CALCIUM SERPL-MCNC: 8.7 MG/DL (ref 8.4–10.2)
CHLORIDE SERPL-SCNC: 110 MMOL/L (ref 96–108)
CO2 SERPL-SCNC: 21 MMOL/L (ref 21–32)
CREAT SERPL-MCNC: 1 MG/DL (ref 0.6–1.3)
EOSINOPHIL # BLD AUTO: 0.01 THOUSAND/ÂΜL (ref 0–0.61)
EOSINOPHIL NFR BLD AUTO: 0 % (ref 0–6)
ERYTHROCYTE [DISTWIDTH] IN BLOOD BY AUTOMATED COUNT: 14.2 % (ref 11.6–15.1)
EST. AVERAGE GLUCOSE BLD GHB EST-MCNC: 246 MG/DL
GFR SERPL CREATININE-BSD FRML MDRD: 53 ML/MIN/1.73SQ M
GLUCOSE SERPL-MCNC: 205 MG/DL (ref 65–140)
GLUCOSE SERPL-MCNC: 223 MG/DL (ref 65–140)
GLUCOSE SERPL-MCNC: 248 MG/DL (ref 65–140)
GLUCOSE SERPL-MCNC: 255 MG/DL (ref 65–140)
HBA1C MFR BLD: 10.2 %
HCT VFR BLD AUTO: 25.6 % (ref 34.8–46.1)
HGB BLD-MCNC: 8.2 G/DL (ref 11.5–15.4)
IMM GRANULOCYTES # BLD AUTO: 0.06 THOUSAND/UL (ref 0–0.2)
IMM GRANULOCYTES NFR BLD AUTO: 1 % (ref 0–2)
LYMPHOCYTES # BLD AUTO: 0.97 THOUSANDS/ÂΜL (ref 0.6–4.47)
LYMPHOCYTES NFR BLD AUTO: 22 % (ref 14–44)
MCH RBC QN AUTO: 28.4 PG (ref 26.8–34.3)
MCHC RBC AUTO-ENTMCNC: 32 G/DL (ref 31.4–37.4)
MCV RBC AUTO: 89 FL (ref 82–98)
MONOCYTES # BLD AUTO: 0.52 THOUSAND/ÂΜL (ref 0.17–1.22)
MONOCYTES NFR BLD AUTO: 12 % (ref 4–12)
NEUTROPHILS # BLD AUTO: 2.86 THOUSANDS/ÂΜL (ref 1.85–7.62)
NEUTS SEG NFR BLD AUTO: 64 % (ref 43–75)
NRBC BLD AUTO-RTO: 0 /100 WBCS
PLATELET # BLD AUTO: 149 THOUSANDS/UL (ref 149–390)
PMV BLD AUTO: 10.7 FL (ref 8.9–12.7)
POTASSIUM SERPL-SCNC: 3.5 MMOL/L (ref 3.5–5.3)
PROCALCITONIN SERPL-MCNC: 6.96 NG/ML
RBC # BLD AUTO: 2.89 MILLION/UL (ref 3.81–5.12)
SODIUM SERPL-SCNC: 141 MMOL/L (ref 135–147)
WBC # BLD AUTO: 4.44 THOUSAND/UL (ref 4.31–10.16)

## 2025-06-30 PROCEDURE — 84145 PROCALCITONIN (PCT): CPT

## 2025-06-30 PROCEDURE — 99232 SBSQ HOSP IP/OBS MODERATE 35: CPT

## 2025-06-30 PROCEDURE — 85025 COMPLETE CBC W/AUTO DIFF WBC: CPT | Performed by: INTERNAL MEDICINE

## 2025-06-30 PROCEDURE — 80048 BASIC METABOLIC PNL TOTAL CA: CPT | Performed by: INTERNAL MEDICINE

## 2025-06-30 PROCEDURE — 83036 HEMOGLOBIN GLYCOSYLATED A1C: CPT

## 2025-06-30 PROCEDURE — 82948 REAGENT STRIP/BLOOD GLUCOSE: CPT

## 2025-06-30 RX ORDER — LOSARTAN POTASSIUM 50 MG/1
100 TABLET ORAL DAILY
Status: DISCONTINUED | OUTPATIENT
Start: 2025-06-30 | End: 2025-07-01 | Stop reason: HOSPADM

## 2025-06-30 RX ORDER — HYDROCHLOROTHIAZIDE 12.5 MG/1
12.5 TABLET ORAL DAILY
Status: DISCONTINUED | OUTPATIENT
Start: 2025-06-30 | End: 2025-07-01 | Stop reason: HOSPADM

## 2025-06-30 RX ADMIN — INSULIN LISPRO 2 UNITS: 100 INJECTION, SOLUTION INTRAVENOUS; SUBCUTANEOUS at 15:49

## 2025-06-30 RX ADMIN — AZITHROMYCIN MONOHYDRATE 500 MG: 500 INJECTION, POWDER, LYOPHILIZED, FOR SOLUTION INTRAVENOUS at 20:51

## 2025-06-30 RX ADMIN — HEPARIN SODIUM 5000 UNITS: 5000 INJECTION INTRAVENOUS; SUBCUTANEOUS at 13:53

## 2025-06-30 RX ADMIN — HEPARIN SODIUM 5000 UNITS: 5000 INJECTION INTRAVENOUS; SUBCUTANEOUS at 05:02

## 2025-06-30 RX ADMIN — PRAVASTATIN SODIUM 40 MG: 40 TABLET ORAL at 15:48

## 2025-06-30 RX ADMIN — LOSARTAN POTASSIUM 100 MG: 50 TABLET, FILM COATED ORAL at 15:48

## 2025-06-30 RX ADMIN — INSULIN LISPRO 2 UNITS: 100 INJECTION, SOLUTION INTRAVENOUS; SUBCUTANEOUS at 08:21

## 2025-06-30 RX ADMIN — CEFTRIAXONE SODIUM 1000 MG: 10 INJECTION, POWDER, FOR SOLUTION INTRAVENOUS at 17:19

## 2025-06-30 RX ADMIN — INSULIN LISPRO 2 UNITS: 100 INJECTION, SOLUTION INTRAVENOUS; SUBCUTANEOUS at 12:16

## 2025-06-30 RX ADMIN — HYDROCHLOROTHIAZIDE 12.5 MG: 12.5 TABLET ORAL at 15:48

## 2025-06-30 RX ADMIN — HEPARIN SODIUM 5000 UNITS: 5000 INJECTION INTRAVENOUS; SUBCUTANEOUS at 22:13

## 2025-06-30 RX ADMIN — INSULIN LISPRO 1 UNITS: 100 INJECTION, SOLUTION INTRAVENOUS; SUBCUTANEOUS at 22:13

## 2025-06-30 NOTE — PLAN OF CARE
Problem: INFECTION - ADULT  Goal: Absence or prevention of progression during hospitalization  Description: INTERVENTIONS:  - Assess and monitor for signs and symptoms of infection  - Monitor lab/diagnostic results  - Monitor all insertion sites, i.e. indwelling lines, tubes, and drains  - Monitor endotracheal if appropriate and nasal secretions for changes in amount and color  - Black appropriate cooling/warming therapies per order  - Administer medications as ordered  - Instruct and encourage patient and family to use good hand hygiene technique  - Identify and instruct in appropriate isolation precautions for identified infection/condition  Outcome: Progressing     Problem: SAFETY ADULT  Goal: Patient will remain free of falls  Description: INTERVENTIONS:  - Educate patient/family on patient safety including physical limitations  - Instruct patient to call for assistance with activity   - Consider consulting OT/PT to assist with strengthening/mobility based on AM PAC & JH-HLM score  - Consult OT/PT to assist with strengthening/mobility   - Keep Call bell within reach  - Keep bed low and locked with side rails adjusted as appropriate  - Keep care items and personal belongings within reach  - Initiate and maintain comfort rounds  - Make Fall Risk Sign visible to staff  - Offer Toileting every  Hours, in advance of need  - Initiate/Maintain alarm  - Obtain necessary fall risk management equipment:   - Apply yellow socks and bracelet for high fall risk patients  - Consider moving patient to room near nurses station  Outcome: Progressing

## 2025-06-30 NOTE — ASSESSMENT & PLAN NOTE
Presented with fever 100.9, procal 9, tachycardia, elevated lactate of 2.3 suspect possibly from RLL PNA as noted on CXR  Continue ceftriaxone and azithromycin  Clinically improving however still with intermittent fevers  WBC count within normal limits  Blood cultures thus far negative  Continue ceftriaxone azithromycin for now  Lactate is since normalized  Procalcitonin 9.50-> 16.40-> 6.96.  Monitor WBC count

## 2025-06-30 NOTE — ASSESSMENT & PLAN NOTE
Lab Results   Component Value Date    HGBA1C 8.3 (H) 04/15/2020       Recent Labs     06/29/25  1056 06/29/25  1633 06/29/25  2110 06/30/25  1106   POCGLU 301* 329* 252* 255*       Blood Sugar Average: Last 72 hrs:  (P) 259     Update hemoglobin A1c  Hold PO agents  SSI  Monitor BG

## 2025-06-30 NOTE — PLAN OF CARE
Problem: PAIN - ADULT  Goal: Verbalizes/displays adequate comfort level or baseline comfort level  Description: Interventions:  - Encourage patient to monitor pain and request assistance  - Assess pain using appropriate pain scale  - Administer analgesics as ordered based on type and severity of pain and evaluate response  - Implement non-pharmacological measures as appropriate and evaluate response  - Consider cultural and social influences on pain and pain management  - Notify physician/advanced practitioner if interventions unsuccessful or patient reports new pain  - Educate patient/family on pain management process including their role and importance of  reporting pain   - Provide non-pharmacologic/complimentary pain relief interventions  Outcome: Progressing     Problem: INFECTION - ADULT  Goal: Absence or prevention of progression during hospitalization  Description: INTERVENTIONS:  - Assess and monitor for signs and symptoms of infection  - Monitor lab/diagnostic results  - Monitor all insertion sites, i.e. indwelling lines, tubes, and drains  - Monitor endotracheal if appropriate and nasal secretions for changes in amount and color  - [place] appropriate cooling/warming therapies per order  - Administer medications as ordered  - Instruct and encourage patient and family to use good hand hygiene technique  - Identify and instruct in appropriate isolation precautions for identified infection/condition  Outcome: Progressing  Goal: Absence of fever/infection during neutropenic period  Description: INTERVENTIONS:  - Monitor WBC  - Perform strict hand hygiene  - Limit to healthy visitors only  - No plants, dried, fresh or silk flowers with osborn in patient room  Outcome: Progressing     Problem: Knowledge Deficit  Goal: Patient/family/caregiver demonstrates understanding of disease process, treatment plan, medications, and discharge instructions  Description: Complete learning assessment and assess knowledge  base.  Interventions:  - Provide teaching at level of understanding  - Provide teaching via preferred learning methods  Outcome: Progressing     Problem: Nutrition/Hydration-ADULT  Goal: Nutrient/Hydration intake appropriate for improving, restoring or maintaining nutritional needs  Description: Monitor and assess patient's nutrition/hydration status for malnutrition. Collaborate with interdisciplinary team and initiate plan and interventions as ordered.  Monitor patient's weight and dietary intake as ordered or per policy. Utilize nutrition screening tool and intervene as necessary. Determine patient's food preferences and provide high-protein, high-caloric foods as appropriate.     INTERVENTIONS:  - Monitor oral intake, urinary output, labs, and treatment plans  - Assess nutrition and hydration status and recommend course of action  - Evaluate amount of meals eaten  - Assist patient with eating if necessary   - Allow adequate time for meals  - Recommend/ encourage appropriate diets, oral nutritional supplements, and vitamin/mineral supplements  - Order, calculate, and assess calorie counts as needed  - Recommend, monitor, and adjust tube feedings and TPN/PPN based on assessed needs  - Assess need for intravenous fluids  - Provide specific nutrition/hydration education as appropriate  - Include patient/family/caregiver in decisions related to nutrition  Outcome: Progressing

## 2025-06-30 NOTE — ASSESSMENT & PLAN NOTE
Bp controlled  Hold hctz, arb in setting of julee  Hydrlazine as needed  JULEE resolved.  Creatinine today at baseline 1.0  Resume PTA BP medications  Diovan -12.5 mg substituted with losartan HCTZ

## 2025-06-30 NOTE — CASE MANAGEMENT
Case Management Assessment & Discharge Planning Note    Patient name Ariella Shepherd  Location /-01 MRN [id number]  : 1945 Date 2025       Current Admission Date: 2025  Current Admission Diagnosis:Sepsis (HCC)   Patient Active Problem List    Diagnosis Date Noted    Acute encephalopathy 2025    Sepsis (HCC) 2025    Tooth abscess 2020    Severe protein-calorie malnutrition (HCC) 2020    Hypernatremia 2020    COVID-19 virus detected 2020    DM (diabetes mellitus) (HCC) 2020    HTN (hypertension) 2020    Pneumonia 2020    JULEE (acute kidney injury) ([facility]) 2020    Abnormal finding on CT scan 2020    AMS (altered mental status) 2020      LOS (days): 2  Geometric Mean LOS (GMLOS) (days): 4.9  Days to GMLOS:2.8     OBJECTIVE:    Risk of Unplanned Readmission Score: 11.04         Current admission status: Inpatient       Preferred Pharmacy:   PATIENT/FAMILY REPORTS NO PREFERRED PHARMACY  No address on file      Primary Care Provider: No primary care provider on file.    Primary Insurance: MEDICARE MISC REPLACEMENT  Secondary Insurance:     ASSESSMENT:  Active Health Care Proxies       alejandro zuniga [facility] Representative - Daughter   Primary Phone: [phone] (Mobile)                 Advance Directives  Does patient have a Health Care POA?: No  Was patient offered paperwork?: Yes (declined)  Does patient currently have a Health Care decision maker?: Yes, please see Health Care Proxy section  Does patient have Advance Directives?: No  Was patient offered paperwork?: Yes (declined)  Primary Contact: alejandro zuniga         Readmission Root Cause  30 Day Readmission: No    Patient Information  Admitted from:: Home  Mental Status: Confused  During Assessment patient was accompanied by: Other-Comment (CM spoke to daughter on phone)  Assessment information provided by:: Daughter  Primary Caregiver: Self  Support  Systems: Son, Daughter  County of Residence: Other (specify in comment box) ([place])  What city do you live in?: [place]  Home entry access options. Select all that apply.: Stairs  Number of steps to enter home.: 1  Do the steps have railings?: Yes  Type of Current Residence: [facility]  Living Arrangements: Lives w/ Children  Is patient a ?: No    Activities of Daily Living Prior to Admission  Functional Status: Assistance  Completes ADLs independently?: No  Level of ADL dependence: Assistance  Ambulates independently?: No  Level of ambulatory dependence: Assistance  Does patient use assisted devices?: No  Does patient currently own DME?: No  Does patient have a history of Outpatient Therapy (PT/OT)?: Yes  Does the patient have a history of Short-Term Rehab?: No  Does patient have a history of HHC?: No  Does patient currently have HHC?: No    Current Home Health Care  Home Health Agency Name:: Other (blanket referral sent)    Patient Information Continued  Income Source: Pension/prison  Does patient have prescription coverage?: Yes  Can the patient afford their medications and any related supplies (such as glucometers or test strips)?: Yes  Does patient receive dialysis treatments?: No  Does patient have a history of substance abuse?: No  Does patient have a history of Mental Health Diagnosis?: No         Means of Transportation  Means of Transport to Appts:: Family transport          DISCHARGE DETAILS:    Discharge planning discussed with:: Nighat- daughter  Freedom of Choice: Yes  Comments - Freedom of Choice: CM spoke with daughter on the phone and introduced self/ role and reviewed FOC at length with understanding from daughter. Patient rotates between living with her 3 children in [place] and [facility]. Currently, daughter Nighat is working on moving her mother to live with her permanently in [facility]. Patient needs assistance when ambulating as a guide since she is blind but otherwise can  move independently. Daughter is open to STR and HHC. CM sent blanket referral for both and will f/u with family on pt choice list. Also pending potential PT/OT eval.  CM contacted family/caregiver?: Yes  Were Treatment Team discharge recommendations reviewed with patient/caregiver?: Yes  Did patient/caregiver verbalize understanding of patient care needs?: Yes  Were patient/caregiver advised of the risks associated with not following Treatment Team discharge recommendations?: Yes    Contacts  Patient Contacts: carolyn  Relationship to Patient:: Family  Contact Method: Phone  Phone Number: see faceheet  Reason/Outcome: Continuity of Care, Discharge Planning    Requested Home Health Care         Is the patient interested in HHC at discharge?: Yes  Home Health Discipline requested:: Nursing, Occupational Therapy, Physical Therapy  Home Health Agency Name:: Other (blanket referral sent)  Home Health Follow-Up Provider:: PCP  Home Health Services Needed:: Evaluate Functional Status and Safety, Gait/ADL Training, Strengthening/Theraputic Exercises to Improve Function  Homebound Criteria Met:: Requires the Assistance of Another Person for Safe Ambulation or to Leave the Home  Supporting Clincal Findings:: Fatigues Easliy in Short Distances    DME Referral Provided  Referral made for DME?: No    Other Referral/Resources/Interventions Provided:  Interventions: HHC, Short Term Rehab  Referral Comments: Daughter is open to STR and HHC. CM sent blanket referral for both and will f/u with family on pt choice list. Also pending potential PT/OT eval.    Would you like to participate in our [facility] service program?  : No - Declined    Treatment Team Recommendation: Home with Home Health Care, Short Term Rehab  Expected Discharge Disposition: Home Health Services  Additional Discharge Dispositions: Home Health Services  Transport at Discharge : Family

## 2025-06-30 NOTE — ASSESSMENT & PLAN NOTE
Noted to have right lower lobe pneumonia secondary to aspiration as evidenced by chest x-ray finding  Continue IV ceftriaxone and azithromycin D# 4  If continues to improve possible transition to p.o. antibiotics tomorrow  Speech therapy recommendation dysphagia level 3 diet with thin liquids  Aspiration precautions

## 2025-06-30 NOTE — PROGRESS NOTES
Progress Note - Hospitalist   Name: Ariella Shepherd 80 y.o. female I MRN: [id number]  Unit/Bed#: -01 I Date of Admission: 6/27/2025   Date of Service: 6/30/2025 I Hospital Day: 2    Assessment & Plan  Sepsis (Formerly [facility])  Presented with fever 100.9, procal 9, tachycardia, elevated lactate of 2.3 suspect possibly from RLL PNA as noted on CXR  Continue ceftriaxone and azithromycin  Clinically improving however still with intermittent fevers  WBC count within normal limits  Blood cultures thus far negative  Continue ceftriaxone azithromycin for now  Lactate is since normalized  Procalcitonin 9.50-> 16.40-> 6.96.  Monitor WBC count  DM (diabetes mellitus) (Formerly [facility])  Lab Results   Component Value Date    HGBA1C 8.3 (H) 04/15/2020       Recent Labs     06/29/25  1056 06/29/25  1633 06/29/25  2110 06/30/25  1106   POCGLU 301* 329* 252* 255*       Blood Sugar Average: Last 72 hrs:  (P) 259     Update hemoglobin A1c  Hold PO agents  SSI  Monitor BG  HTN (hypertension)  Bp controlled  Hold hctz, arb in setting of julee  Hydrlazine as needed  JULEE resolved.  Creatinine today at baseline 1.0  Resume PTA BP medications  Diovan -12.5 mg substituted with losartan HCTZ  JULEE (acute kidney injury) (Formerly [facility])  Baseline cr 1  Creatinine noted to be 1.36  Suspect from fever/sepsis  Has since resolved with IV fluids  Encourage oral hydration  Pneumonia  Noted to have right lower lobe pneumonia secondary to aspiration as evidenced by chest x-ray finding  Continue IV ceftriaxone and azithromycin D# 4  If continues to improve possible transition to p.o. antibiotics tomorrow  Speech therapy recommendation dysphagia level 3 diet with thin liquids  Aspiration precautions  Acute encephalopathy  Presented with lethargy, somnolence  Secondary to pneumonia/sepsis  Has since significantly improved with IV antibiotics now mental status back to baseline    VTE Pharmacologic Prophylaxis: VTE Score: 6 High Risk (Score >/= 5) - Pharmacological DVT Prophylaxis  Ordered: heparin. Sequential Compression Devices Ordered.    Mobility:   Basic Mobility Inpatient Raw Score: 13  JH-HLM Goal: 4: Move to chair/commode  JH-HLM Achieved: 2: Bed activities/Dependent transfer  JH-HLM Goal achieved. Continue to encourage appropriate mobility.    Patient Centered Rounds: I performed bedside rounds with nursing staff today.   Discussions with Specialists or Other Care Team Provider: Yes    Education and Discussions with Family / Patient: Updated  (daughter) at bedside.    Current Length of Stay: 2 day(s)  Current Patient Status: Inpatient   Certification Statement: The patient will continue to require additional inpatient hospital stay due to pneumonia on IV antibiotic  Discharge Plan: Anticipate discharge tomorrow to home.    Code Status: Level 1 - Full Code    Subjective   Seen and examined resting comfortably in bed.  Patient reports feeling okay.  Denies any pain, shortness of breath, lightheadedness, nausea or vomiting.    Objective :  Temp:  [97.9 °F (36.6 °C)-102 °F (38.9 °C)] 97.9 °F (36.6 °C)  HR:  [75-78] 75  BP: (139-165)/() 151/89  Resp:  [14-18] 14  SpO2:  [95 %-96 %] 96 %    Body mass index is 22.18 kg/m².     Input and Output Summary (last 24 hours):     Intake/Output Summary (Last 24 hours) at 6/30/2025 1524  Last data filed at 6/30/2025 1400  Gross per 24 hour   Intake 360 ml   Output 1200 ml   Net -840 ml       Physical Exam  Vitals and nursing note reviewed.   Constitutional:       General: She is not in acute distress.     Appearance: She is well-developed.   HENT:      Head: Normocephalic and atraumatic.     Eyes:      Conjunctiva/sclera: Conjunctivae normal.       Cardiovascular:      Rate and Rhythm: Normal rate and regular rhythm.      Heart sounds: No murmur heard.  Pulmonary:      Effort: Pulmonary effort is normal. No respiratory distress.      Breath sounds: Decreased breath sounds present.   Abdominal:      Palpations: Abdomen is soft.       Tenderness: There is no abdominal tenderness.     Musculoskeletal:         General: No swelling.      Cervical back: Neck supple.     Skin:     General: Skin is warm and dry.      Capillary Refill: Capillary refill takes less than 2 seconds.     Neurological:      Mental Status: She is alert.     Psychiatric:         Mood and Affect: Mood normal.           Lines/Drains:              Lab Results: I have reviewed the following results:   Results from last 7 days   Lab Units 06/30/25  0602   WBC Thousand/uL 4.44   HEMOGLOBIN g/dL 8.2*   HEMATOCRIT % 25.6*   PLATELETS Thousands/uL 149   SEGS PCT % 64   LYMPHO PCT % 22   MONO PCT % 12   EOS PCT % 0     Results from last 7 days   Lab Units 06/30/25  0602 06/28/25  0558 06/27/25  1707   SODIUM mmol/L 141   < > 132*   POTASSIUM mmol/L 3.5   < > 4.3   CHLORIDE mmol/L 110*   < > 96   CO2 mmol/L 21   < > 23   BUN mg/dL 18   < > 23   CREATININE mg/dL 1.00   < > 1.36*   ANION GAP mmol/L 10   < > 13   CALCIUM mg/dL 8.7   < > 9.3   ALBUMIN g/dL  --   --  3.6   TOTAL BILIRUBIN mg/dL  --   --  0.55   ALK PHOS U/L  --   --  111*   ALT U/L  --   --  19   AST U/L  --   --  44*   GLUCOSE RANDOM mg/dL 223*   < > 344*    < > = values in this interval not displayed.     Results from last 7 days   Lab Units 06/27/25  1707   INR  1.11     Results from last 7 days   Lab Units 06/30/25  1106 06/29/25  2110 06/29/25  1633 06/29/25  1056 06/29/25  0638 06/28/25  2151 06/28/25  1543 06/28/25  1206 06/28/25  0630 06/27/25  2109 06/27/25  1903   POC GLUCOSE mg/dl 255* 252* 329* 301* 223* 254* 308* 203* 190* 232* 302*         Results from last 7 days   Lab Units 06/30/25  0602 06/28/25  0558 06/27/25  1942 06/27/25  1707   LACTIC ACID mmol/L  --   --  1.2 2.6*   PROCALCITONIN ng/ml 6.96* 16.40*  --  9.50*       Recent Cultures (last 7 days):   Results from last 7 days   Lab Units 06/27/25  1850 06/27/25  1840   BLOOD CULTURE  No Growth at 48 hrs. No Growth at 48 hrs.             Last 24 Hours  Medication List:     Current Facility-Administered Medications:     acetaminophen (TYLENOL) tablet 650 mg, Q6H PRN    azithromycin (ZITHROMAX) 500 mg in sodium chloride 0.9% 250mL IVPB 500 mg, Q24H    benzonatate (TESSALON PERLES) capsule 100 mg, TID PRN    ceftriaxone (ROCEPHIN) 1 g/50 mL in dextrose IVPB, Q24H, Last Rate: 1,000 mg (06/29/25 1720)    heparin (porcine) subcutaneous injection 5,000 Units, Q8H SNOW **AND** [CANCELED] Platelet count, Once    losartan (COZAAR) tablet 100 mg, Daily **AND** hydroCHLOROthiazide tablet 12.5 mg, Daily    insulin lispro (HumALOG/ADMELOG) 100 units/mL subcutaneous injection 1-5 Units, TID AC **AND** Fingerstick Glucose (POCT), TID AC    insulin lispro (HumALOG/ADMELOG) 100 units/mL subcutaneous injection 1-5 Units, HS    pravastatin (PRAVACHOL) tablet 40 mg, Daily With Dinner    Administrative Statements   Today, Patient Was Seen By: ASHLEY Leon      **Please Note: This note may have been constructed using a voice recognition system.**

## 2025-07-01 VITALS
TEMPERATURE: 97.9 F | BODY MASS INDEX: 22.31 KG/M2 | RESPIRATION RATE: 20 BRPM | HEIGHT: 62 IN | SYSTOLIC BLOOD PRESSURE: 140 MMHG | HEART RATE: 77 BPM | DIASTOLIC BLOOD PRESSURE: 75 MMHG | WEIGHT: 121.25 LBS | OXYGEN SATURATION: 98 %

## 2025-07-01 PROBLEM — N17.9 AKI (ACUTE KIDNEY INJURY) (HCC): Status: RESOLVED | Noted: 2020-04-14 | Resolved: 2025-07-01

## 2025-07-01 LAB
ANION GAP SERPL CALCULATED.3IONS-SCNC: 11 MMOL/L (ref 4–13)
BASOPHILS # BLD MANUAL: 0 THOUSAND/UL (ref 0–0.1)
BASOPHILS NFR MAR MANUAL: 0 % (ref 0–1)
BUN SERPL-MCNC: 14 MG/DL (ref 5–25)
CALCIUM SERPL-MCNC: 8.9 MG/DL (ref 8.4–10.2)
CHLORIDE SERPL-SCNC: 104 MMOL/L (ref 96–108)
CO2 SERPL-SCNC: 24 MMOL/L (ref 21–32)
CREAT SERPL-MCNC: 0.93 MG/DL (ref 0.6–1.3)
EOSINOPHIL # BLD MANUAL: 0 THOUSAND/UL (ref 0–0.4)
EOSINOPHIL NFR BLD MANUAL: 0 % (ref 0–6)
ERYTHROCYTE [DISTWIDTH] IN BLOOD BY AUTOMATED COUNT: 14.3 % (ref 11.6–15.1)
GFR SERPL CREATININE-BSD FRML MDRD: 58 ML/MIN/1.73SQ M
GLUCOSE SERPL-MCNC: 210 MG/DL (ref 65–140)
GLUCOSE SERPL-MCNC: 212 MG/DL (ref 65–140)
GLUCOSE SERPL-MCNC: 215 MG/DL (ref 65–140)
HCT VFR BLD AUTO: 27 % (ref 34.8–46.1)
HGB BLD-MCNC: 8.5 G/DL (ref 11.5–15.4)
LYMPHOCYTES # BLD AUTO: 1.05 THOUSAND/UL (ref 0.6–4.47)
LYMPHOCYTES # BLD AUTO: 21 % (ref 14–44)
MAGNESIUM SERPL-MCNC: 1.8 MG/DL (ref 1.9–2.7)
MCH RBC QN AUTO: 27.7 PG (ref 26.8–34.3)
MCHC RBC AUTO-ENTMCNC: 31.5 G/DL (ref 31.4–37.4)
MCV RBC AUTO: 88 FL (ref 82–98)
MONOCYTES # BLD AUTO: 0.5 THOUSAND/UL (ref 0–1.22)
MONOCYTES NFR BLD: 10 % (ref 4–12)
NEUTROPHILS # BLD MANUAL: 3.44 THOUSAND/UL (ref 1.85–7.62)
NEUTS SEG NFR BLD AUTO: 69 % (ref 43–75)
PLATELET # BLD AUTO: 189 THOUSANDS/UL (ref 149–390)
PLATELET BLD QL SMEAR: ADEQUATE
PMV BLD AUTO: 10.9 FL (ref 8.9–12.7)
POTASSIUM SERPL-SCNC: 3.1 MMOL/L (ref 3.5–5.3)
RBC # BLD AUTO: 3.07 MILLION/UL (ref 3.81–5.12)
RBC MORPH BLD: PRESENT
SODIUM SERPL-SCNC: 139 MMOL/L (ref 135–147)
WBC # BLD AUTO: 4.99 THOUSAND/UL (ref 4.31–10.16)

## 2025-07-01 PROCEDURE — 97167 OT EVAL HIGH COMPLEX 60 MIN: CPT

## 2025-07-01 PROCEDURE — 85027 COMPLETE CBC AUTOMATED: CPT | Performed by: INTERNAL MEDICINE

## 2025-07-01 PROCEDURE — 97163 PT EVAL HIGH COMPLEX 45 MIN: CPT

## 2025-07-01 PROCEDURE — 99239 HOSP IP/OBS DSCHRG MGMT >30: CPT

## 2025-07-01 PROCEDURE — 97110 THERAPEUTIC EXERCISES: CPT

## 2025-07-01 PROCEDURE — 83735 ASSAY OF MAGNESIUM: CPT

## 2025-07-01 PROCEDURE — 80048 BASIC METABOLIC PNL TOTAL CA: CPT | Performed by: INTERNAL MEDICINE

## 2025-07-01 PROCEDURE — 85007 BL SMEAR W/DIFF WBC COUNT: CPT | Performed by: INTERNAL MEDICINE

## 2025-07-01 PROCEDURE — 82948 REAGENT STRIP/BLOOD GLUCOSE: CPT

## 2025-07-01 RX ORDER — POTASSIUM CHLORIDE 14.9 MG/ML
20 INJECTION INTRAVENOUS ONCE
Status: COMPLETED | OUTPATIENT
Start: 2025-07-01 | End: 2025-07-01

## 2025-07-01 RX ORDER — BENZONATATE 100 MG/1
100 CAPSULE ORAL 3 TIMES DAILY PRN
Qty: 20 CAPSULE | Refills: 0 | Status: CANCELLED | OUTPATIENT
Start: 2025-07-01

## 2025-07-01 RX ORDER — HYDRALAZINE HYDROCHLORIDE 20 MG/ML
5 INJECTION INTRAMUSCULAR; INTRAVENOUS ONCE
Status: COMPLETED | OUTPATIENT
Start: 2025-07-01 | End: 2025-07-01

## 2025-07-01 RX ADMIN — CEFTRIAXONE SODIUM 1000 MG: 10 INJECTION, POWDER, FOR SOLUTION INTRAVENOUS at 12:44

## 2025-07-01 RX ADMIN — HEPARIN SODIUM 5000 UNITS: 5000 INJECTION INTRAVENOUS; SUBCUTANEOUS at 05:15

## 2025-07-01 RX ADMIN — INSULIN LISPRO 2 UNITS: 100 INJECTION, SOLUTION INTRAVENOUS; SUBCUTANEOUS at 12:14

## 2025-07-01 RX ADMIN — HYDROCHLOROTHIAZIDE 12.5 MG: 12.5 TABLET ORAL at 09:05

## 2025-07-01 RX ADMIN — INSULIN LISPRO 2 UNITS: 100 INJECTION, SOLUTION INTRAVENOUS; SUBCUTANEOUS at 09:05

## 2025-07-01 RX ADMIN — HYDRALAZINE HYDROCHLORIDE 5 MG: 20 INJECTION INTRAMUSCULAR; INTRAVENOUS at 12:44

## 2025-07-01 RX ADMIN — LOSARTAN POTASSIUM 100 MG: 50 TABLET, FILM COATED ORAL at 09:05

## 2025-07-01 RX ADMIN — POTASSIUM CHLORIDE 20 MEQ: 14.9 INJECTION, SOLUTION INTRAVENOUS at 09:05

## 2025-07-01 NOTE — PLAN OF CARE
Problem: PAIN - ADULT  Goal: Verbalizes/displays adequate comfort level or baseline comfort level  Description: Interventions:  - Encourage patient to monitor pain and request assistance  - Assess pain using appropriate pain scale  - Administer analgesics as ordered based on type and severity of pain and evaluate response  - Implement non-pharmacological measures as appropriate and evaluate response  - Consider cultural and social influences on pain and pain management  - Notify physician/advanced practitioner if interventions unsuccessful or patient reports new pain  - Educate patient/family on pain management process including their role and importance of  reporting pain   - Provide non-pharmacologic/complimentary pain relief interventions  Outcome: Progressing     Problem: INFECTION - ADULT  Goal: Absence or prevention of progression during hospitalization  Description: INTERVENTIONS:  - Assess and monitor for signs and symptoms of infection  - Monitor lab/diagnostic results  - Monitor all insertion sites, i.e. indwelling lines, tubes, and drains  - Monitor endotracheal if appropriate and nasal secretions for changes in amount and color  - [place] appropriate cooling/warming therapies per order  - Administer medications as ordered  - Instruct and encourage patient and family to use good hand hygiene technique  - Identify and instruct in appropriate isolation precautions for identified infection/condition  Outcome: Progressing  Goal: Absence of fever/infection during neutropenic period  Description: INTERVENTIONS:  - Monitor WBC  - Perform strict hand hygiene  - Limit to healthy visitors only  - No plants, dried, fresh or silk flowers with osborn in patient room  Outcome: Progressing

## 2025-07-01 NOTE — PLAN OF CARE
Problem: PHYSICAL THERAPY ADULT  Goal: Performs mobility at highest level of function for planned discharge setting.  See evaluation for individualized goals.  Description: Treatment/Interventions: Functional transfer training, LE strengthening/ROM, Therapeutic exercise, Endurance training, Cognitive reorientation, Patient/family training, Equipment eval/education, Bed mobility, Gait training, Spoke to nursing, OT          See flowsheet documentation for full assessment, interventions and recommendations.  Note: Prognosis: Fair  Problem List: Decreased strength, Decreased endurance, Impaired balance, Decreased mobility, Decreased cognition, Impaired judgement, Decreased safety awareness  Assessment: Ariella Shepherd is a 80 y.o. female admitted to [facility] on 6/27/2025 for Sepsis. Pt  has a past medical history of JULEE (acute kidney injury) (HCC) (04/14/2020), Cancer ([facility]), Diabetes mellitus ([facility]), and Hypertension. Order was placed for PT eval and tx. Pt was seen on 7/1/2025 for an assessment of functional mobility, strength, balance and d/c planning. Chart review and two person identifiers were completed. The pt presents with decreased strength , decreased dynamic sitting balance , decreased static standing balance, decreased dynamic standing balance , decreased gait speed, decreased step length , decreased cognition, decreased safety awareness , and decreased muscular endurance . Due to these deficits and functional limitations, the pt will require assistance to perform bed mobility, sit to stand , ambulation, stair negotiation, and transfers. Pt is currently functioning at a minimum assistance x2 level for transfers and minimum assistance x2 level for ambulation with Rolling Walker. Due to present impairments and functional limitations, pt participation in previous roles and responsibilities at home or in the community  is significantly reduced. The patient's AM-PAC Basic Mobility Inpatient Short Form Raw Score is 12.  PT recommends level II moderate resource intensity as a result of the pt's current presentation, rehab potential, and level of social support. The pt will benefit from skilled physical therapy to address to reduce the risk of falls, to allow for safe ambulation, to decrease care giver burden , to maximize functional potential, and to facilitate safe discharge .  Barriers to Discharge: Other (Comment) (decline in functional mobility)     Rehab Resource Intensity Level, PT: II (Moderate Resource Intensity)    See flowsheet documentation for full assessment.      Aysha Larson, SPT

## 2025-07-01 NOTE — OCCUPATIONAL THERAPY NOTE
"          Occupational Therapy Evaluation        Patient Name: Ariella Shepherd  Today's Date: 7/1/2025 07/01/25 1154   OT Last Visit   OT Visit Date 07/01/25   Note Type   Note type Evaluation   Pain Assessment   Pain Assessment Tool 0-10   Pain Score No Pain   Restrictions/Precautions   Weight Bearing Precautions Per Order No   Braces or Orthoses Other (Comment)  (unknown)   Other Precautions Cognitive;Chair Alarm;Bed Alarm;Multiple lines;Fall Risk   Home Living   Type of Home House   Home Layout One level;Performs ADLs on one level;Able to live on main level with bedroom/bathroom;Stairs to enter with rails  (2 DIONISIO)   Home Equipment Other (Comment)  (No AD)   Additional Comments pt poor historian; information gathered from chart review, case management to follow-up   Prior Function   Lives With Daughter;Son;Other (Comment)  (1 daughter, 2 sons, grandchildren)   Receives Help From Family   Vocational Retired   Comments Pt recently moved from [place] and has been staying with daughter Nighat and son James in a one story house with 2 DIONISIO.  Family assists pt on stairs and recently have been assisting with showering and ADL's.  She uses no DME's.   Lifestyle   Autonomy Patient unable to provide information related to PLIOF or home set up. Chart review indicates:  \"Pt recently moved from [place] and has been staying with daughter Nighat and son James in a one story house with 2 DIONISIO. Family assists pt on stairs and recently have been assisting with showering and ADL's. She uses no DME's\"   Reciprocal Relationships Supportive Family   General   Family/Caregiver Present No   ADL   Where Assessed Edge of bed   Eating Assistance 5  Supervision/Setup   Grooming Assistance 4  Minimal Assistance   UB Bathing Assistance 3  Moderate Assistance   LB Bathing Assistance 2  Maximal Assistance   UB Dressing Assistance 3  Moderate Assistance   LB Dressing Assistance 2  Maximal Assistance   Toileting Assistance  2  Maximal Assistance   Functional " Assistance 2  Maximal Assistance   Additional Comments Assist levels for self care tasks,  are based on functional assessment of performance skills and deficits observed during functional mobility assessement   Bed Mobility   Additional Comments received patient sitting at edge of bed   Transfers   Sit to Stand 4  Minimal assistance   Additional items Assist x 2;HOB elevated;Increased time required;Verbal cues   Stand to Sit 4  Minimal assistance   Additional items Assist x 2;Increased time required;Verbal cues   Functional Mobility   Functional Mobility 4  Minimal assistance   Additional Comments assist of 2/ RW/ slow henrry/ verbal cues for   Additional items Rolling walker   Balance   Static Sitting Fair +   Dynamic Sitting Fair   Static Standing Fair -   Dynamic Standing Poor +   Activity Tolerance   Activity Tolerance Treatment limited secondary to medical complications (Comment)  (cognitive status)   Medical Staff Made Aware Pt seen as co-eval with PT due to co-morbidities, decline in functional mobility, and complex medical presentation.   RUE Assessment   RUE Assessment X   RUE Strength   RUE Overall Strength Deficits  (3/5)   LUE Assessment   LUE Assessment X   LUE Strength   LUE Overall Strength Deficits  (3/5)   Hand Function   Gross Motor Coordination Impaired   Fine Motor Coordination Impaired   Sensation   Light Touch No apparent deficits  (BUEs)   Vision-Basic Assessment   Current Vision Does not wear glasses   Cognition   Overall Cognitive Status Impaired   Arousal/Participation Alert;Responsive;Cooperative   Attention Attends with cues to redirect   Orientation Level Oriented to person;Disoriented to place;Disoriented to time;Disoriented to situation   Memory Decreased short term memory;Decreased recall of recent events   Following Commands Follows one step commands with increased time or repetition   Assessment   Limitation Decreased ADL status;Decreased UE strength;Decreased Safe judgement during  "ADL;Decreased cognition;Decreased endurance;Decreased self-care trans;Decreased fine motor control;Decreased high-level ADLs   Prognosis Good   Assessment Patient is a 80 y.o. female seen for OT evaluation s/p admit to [facility] on 6/27/2025 w/Sepsis (HCC). Commorbidities affecting patient's functional performance at time of assessment include: Sepsis, DM, HTN,JULEE, presented to ED with fevers, chills and body aches. Orders placed for OT evaluation and treatment.  Performed at least two patient identifiers during session including name and wristband.  Prior to admission, Patient unable to provide information related to PLIOF or home set up. Chart review indicates: \"Pt recently moved from [place] and has been staying with daughter Nighat and son James in a one story house with 2 DIONISIO. Family assists pt on stairs and recently have been assisting with showering and ADL's. She uses no DME's\" . Personal factors affecting patient at time of initial evaluation include: steps to enter, limited insight into deficits, decreased initiation and engagement, difficulty performing ADLs, and difficulty performing IADLs. Upon evaluation, patient requires moderate assist for UB ADLs, maximal assist for LB ADLs, transfers and functional ambulation in room and bathroom with maximal assist, with the use of Rolling Walker.  Occupational performance is affected by the following deficits: orientation, decreased functional use of BUEs, in hand manipulation deficit with impaired reach, grasp and coordination, decreased muscle strength, degenerative arthritic joint changes, impaired gross motor coordination, impaired fine motor coordination, dynamic sit/ stand balance deficit with poor standing tolerance time for self care and functional mobility, decreased activity tolerance, impaired memory, impaired problem solving, decreased safety awareness, and postural control and postural alignment deficit, requiring external assistance to " complete transitional movements.  Patient to benefit from continued Occupational Therapy treatment while in the hospital to address deficits as defined above and maximize level of functional independence with ADLs and functional mobility. Occupational Performance areas to address include: bathing/ shower, dressing, toilet hygiene, transfer to all surfaces, functional mobility, health maintenance, and Leisure Participation. From OT standpoint, recommendation at time of d/c would be Level 2.   Plan   Treatment Interventions ADL retraining;Functional transfer training;UE strengthening/ROM;Endurance training;Cognitive reorientation;Patient/family training;Equipment evaluation/education;Fine motor coordination activities;Compensatory technique education;Continued evaluation;Energy conservation;Activityengagement   Goal Expiration Date 07/15/25   OT Frequency 3-5x/wk   Discharge Recommendation   Rehab Resource Intensity Level, OT II (Moderate Resource Intensity)   AM-PAC Daily Activity Inpatient   Lower Body Dressing 2   Bathing 2   Toileting 2   Upper Body Dressing 2   Grooming 3   Eating 3   Daily Activity Raw Score 14   Daily Activity Standardized Score (Calc for Raw Score >=11) 33.39   AM-PAC Applied Cognition Inpatient   Following a Speech/Presentation 2   Understanding Ordinary Conversation 2   Taking Medications 1   Remembering Where Things Are Placed or Put Away 1   Remembering List of 4-5 Errands 1   Taking Care of Complicated Tasks 1   Applied Cognition Raw Score 8   Applied Cognition Standardized Score 19.32   Barthel Index   Feeding 5   Bathing 0   Grooming Score 0   Dressing Score 0   Bladder Score 5   Bowels Score 5   Toilet Use Score 5   Transfers (Bed/Chair) Score 5   Mobility (Level Surface) Score 0   Stairs Score 0   Barthel Index Score 25       Occupational Therapy goals:     1- Patient will verbalize and demonstrate use of energy conservation/ deep breathing technique and work simplification skills  during functional activity with no verbal cues.  2-Patient will verbalize and demonstrate good body mechanics and joint protection techniques during  ADLs/ IADLs with no verbal cues  3- Patient will increase OOB/ sitting tolerance to 2-4 hours per day for increased participation in self care and leisure tasks with no s/s of exertion.  4-Patient will increase standing tolerance time to 5  minutes with unilateral UE support to complete sink level ADLs@ mod I level   5- Patient will increase sitting tolerance at edge of bed to 20 minutes to complete UB ADLs @ set up assist level.  6- Patient will transfer bed to Chair / toilet at Set up assist level with AD as indicated.  7- Patient will complete UB ADLs with set up assist  8- Patient will complete LB ADLs with min assist with the use of adaptive equipment.  9- Patient will complete toileting hygiene with set up assist/ supervision for thoroughness.  10-Patient/ Family  will demonstrate competency with UE Home Exercise Program.

## 2025-07-01 NOTE — PLAN OF CARE
Problem: PAIN - ADULT  Goal: Verbalizes/displays adequate comfort level or baseline comfort level  Description: Interventions:  - Encourage patient to monitor pain and request assistance  - Assess pain using appropriate pain scale  - Administer analgesics as ordered based on type and severity of pain and evaluate response  - Implement non-pharmacological measures as appropriate and evaluate response  - Consider cultural and social influences on pain and pain management  - Notify physician/advanced practitioner if interventions unsuccessful or patient reports new pain  - Educate patient/family on pain management process including their role and importance of  reporting pain   - Provide non-pharmacologic/complimentary pain relief interventions  7/1/2025 1454 by Sherri Chavez RN  Outcome: Adequate for Discharge  7/1/2025 1300 by Sherri Chavez RN  Outcome: Progressing     Problem: INFECTION - ADULT  Goal: Absence or prevention of progression during hospitalization  Description: INTERVENTIONS:  - Assess and monitor for signs and symptoms of infection  - Monitor lab/diagnostic results  - Monitor all insertion sites, i.e. indwelling lines, tubes, and drains  - Monitor endotracheal if appropriate and nasal secretions for changes in amount and color  - [place] appropriate cooling/warming therapies per order  - Administer medications as ordered  - Instruct and encourage patient and family to use good hand hygiene technique  - Identify and instruct in appropriate isolation precautions for identified infection/condition  7/1/2025 1454 by Sherri Chavez RN  Outcome: Adequate for Discharge  7/1/2025 1300 by Sherri Chavez RN  Outcome: Progressing  Goal: Absence of fever/infection during neutropenic period  Description: INTERVENTIONS:  - Monitor WBC  - Perform strict hand hygiene  - Limit to healthy visitors only  - No plants, dried, fresh or silk flowers with osborn in patient room  7/1/2025 1454 by Sherri Chavez RN  Outcome: Adequate  for Discharge  7/1/2025 1300 by Sherri Chavez, RN  Outcome: Progressing      Class I (easy) - visualization of the soft palate, fauces, uvula, and both anterior and posterior pillars

## 2025-07-01 NOTE — CASE MANAGEMENT
Case Management Discharge Planning Note    Patient name Ariella Shepherd  Location /-01 MRN [id number]  : 1945 Date 2025       Current Admission Date: 2025  Current Admission Diagnosis:Sepsis (HCC)   Patient Active Problem List    Diagnosis Date Noted    Acute encephalopathy 2025    Sepsis (HCC) 2025    Tooth abscess 2020    Severe protein-calorie malnutrition (HCC) 2020    Hypernatremia 2020    COVID-19 virus detected 2020    DM (diabetes mellitus) (HCC) 2020    HTN (hypertension) 2020    Pneumonia 2020    JULEE (acute kidney injury) (HCC) 2020    Abnormal finding on CT scan 2020    AMS (altered mental status) 2020      LOS (days): 3  Geometric Mean LOS (GMLOS) (days): 4.9  Days to GMLOS:2     OBJECTIVE:  Risk of Unplanned Readmission Score: 11.43         Current admission status: Inpatient   Preferred Pharmacy:   PATIENT/FAMILY REPORTS NO PREFERRED PHARMACY  No address on file      Primary Care Provider: No primary care provider on file.    Primary Insurance: MEDICARE MISC REPLACEMENT  Secondary Insurance:     DISCHARGE DETAILS:    Discharge planning discussed with:: daughter  Freedom of Choice: Yes  Comments - Freedom of Choice: CM left voicemail for son and spoke with daughter on the phone stating that patient is going to be discharged today and if they want to consider STR or HHC. Daughter stated that herself and her 2 brothers all take care of their mother and they are declining STR and HHC. She said that her brother is setting up HHC for the pt in [place]. SLIM aware. No CM needs anticipated.  CM contacted family/caregiver?: Yes  Were Treatment Team discharge recommendations reviewed with patient/caregiver?: Yes  Did patient/caregiver verbalize understanding of patient care needs?: Yes       Contacts  Patient Contacts: carolyn  Relationship to Patient:: Family  Contact Method: Phone  Phone Number: see  faceheet  Reason/Outcome: Continuity of Care, Discharge Planning    Requested Home Health Care         Is the patient interested in C at discharge?: No     IMM Given (Date):: 07/01/25  IMM Given to:: Patient     Additional Comments: Imm reviewed with patient and verbal consent was obtained. Copy left at bedside and original is uploaded to Medical records.

## 2025-07-01 NOTE — PHYSICAL THERAPY NOTE
Physical Therapy Evaluation    Patient's Name: Ariella Shepherd    Admitting Diagnosis  Pneumonia [J18.9]  Flu-like symptoms [R68.89]    Problem List  Problem List[1]    Past Medical History  Past Medical History[2]    Past Surgical History  Past Surgical History[3]    Recent Imaging  XR chest pa & lateral   Final Result by Kg Montanez MD (06/27 1757)      Consolidation in the right lung base compatible with pneumonia. Suggest follow-up to ensure resolution after treatment      The study was marked in EPIC for immediate notification.            Workstation performed: [id number]             Recent Vital Signs  Vitals:    07/01/25 1226 07/01/25 1244 07/01/25 1315 07/01/25 1347   BP: (!) 175/94 (!) 175/94 (!) 180/82 140/75   BP Location:   Right arm Right arm   Pulse: 67  77    Resp:   20    Temp:       TempSrc:       SpO2: 98%      Weight:       Height:                07/01/25 1132   PT Last Visit   PT Visit Date 07/01/25   Note Type   Note type Evaluation   Pain Assessment   Pain Assessment Tool 0-10   Pain Score No Pain   Restrictions/Precautions   Weight Bearing Precautions Per Order No   Other Precautions Chair Alarm;Bed Alarm;Telemetry;Multiple lines;Cognitive;Fall Risk   Home Living   Type of Home House   Home Layout One level;Performs ADLs on one level;Able to live on main level with bedroom/bathroom;Stairs to enter with rails  (2 DIONISIO)   Home Equipment Other (Comment)  (No AD)   Additional Comments Pt previously ambulated with no AD at baseline. Pt poor historian; information gathered from chart review, case management to follow-up.   Prior Function   Level of [place] Needs assistance with ADLs;Needs assistance with functional mobility   Lives With Daughter;Son;Other (Comment)  (1 daughter, 2 sons, grandchildren)   Receives Help From Family   Vocational Retired   Comments pt poor historian, PLOF gathered from chart review   General   Family/Caregiver Present No   Cognition   Overall Cognitive  Status Impaired   Arousal/Participation Cooperative   Orientation Level Oriented to person;Disoriented to place;Disoriented to time;Disoriented to situation   Memory Decreased short term memory;Decreased recall of recent events   Following Commands Follows one step commands with increased time or repetition   Comments pt agreeable to PT eval   RLE Assessment   RLE Assessment   (strength grossly assessed 3+/5 during functional mobility)   LLE Assessment   LLE Assessment   (strength grossly assessed 3+/5 during functional mobility)   Coordination   Sensation WFL   Bed Mobility   Additional Comments pt received sitting EOB   Transfers   Sit to Stand 4  Minimal assistance   Additional items Assist x 2;HOB elevated;Increased time required;Verbal cues   Stand to Sit 4  Minimal assistance   Additional items Assist x 2;Increased time required;Verbal cues;Armrests   Additional Comments w/ RW   Ambulation/Elevation   Gait pattern Shuffling;Short stride;Excessively slow;Foward flexed;Step to;Improper Weight shift   Gait Assistance 4  Minimal assist   Additional items Assist x 2;Verbal cues;Tactile cues   Assistive Device Rolling walker   Distance 5',15',25'   Balance   Static Sitting Fair +   Dynamic Sitting Fair   Static Standing Fair -   Dynamic Standing Poor +   Ambulatory Poor +   Endurance Deficit   Endurance Deficit Yes   Endurance Deficit Description decreased activity tolerance   Activity Tolerance   Activity Tolerance Treatment limited secondary to medical complications (Comment)  (cognitive status)   Medical Staff Made Aware OT Kiah, PT Alanna  (Pt seen as co-eval with OT due to co-morbidities, decline in functional mobility, and complex medical presentation. PT/OT goals addressed separately.)   Nurse Made Aware RN made aware   Assessment   Prognosis Fair   Problem List Decreased strength;Decreased endurance;Impaired balance;Decreased mobility;Decreased cognition;Impaired judgement;Decreased safety awareness    Assessment Ariella Shepherd is a 80 y.o. female admitted to [facility] on 6/27/2025 for Sepsis. Pt  has a past medical history of JULEE (acute kidney injury) (HCC) (04/14/2020), Cancer (HCC), Diabetes mellitus (HCC), and Hypertension. Order was placed for PT eval and tx. Pt was seen on 7/1/2025 for an assessment of functional mobility, strength, balance and d/c planning. Chart review and two person identifiers were completed. The pt presents with decreased strength , decreased dynamic sitting balance , decreased static standing balance, decreased dynamic standing balance , decreased gait speed, decreased step length , decreased cognition, decreased safety awareness , and decreased muscular endurance . Due to these deficits and functional limitations, the pt will require assistance to perform bed mobility, sit to stand , ambulation, stair negotiation, and transfers. Pt is currently functioning at a minimum assistance x2 level for transfers and minimum assistance x2 level for ambulation with Rolling Walker. Due to present impairments and functional limitations, pt participation in previous roles and responsibilities at home or in the community  is significantly reduced. The patient's AM-PAC Basic Mobility Inpatient Short Form Raw Score is 12. PT recommends level II moderate resource intensity as a result of the pt's current presentation, rehab potential, and level of social support. The pt will benefit from skilled physical therapy to address to reduce the risk of falls, to allow for safe ambulation, to decrease care giver burden , to maximize functional potential, and to facilitate safe discharge .   Barriers to Discharge Other (Comment)  (decline in functional mobility)   Goals   STG Expiration Date 07/11/25   Short Term Goal #1 Within 10 days patient will complete:   1) Bed mobility skills with supervision assistance x1 to improve functional independence and improve ability to return to previous living environment  2)  Functional transfers with minimum assistance x1 to improve tolerance to ADLs and improve safe return to previous living environment  3) Ambulate 50' with minimum assistance x1 with the least restrictive AD without onset of symptoms or LOB and with stable vitals for safe ambulation at home/community distances  4) Improve balance by 1 grade to improve dynamic stability and reduce risk of falls.   5) Improve LE MMT strength grades by 1 to improve mobility and improve standing/ambulation tolerance.  6) PT for ongoing pt and family education; DME needs and D/C planning to promote highest level of function in least restrictive environment.   Plan   Treatment/Interventions Functional transfer training;LE strengthening/ROM;Therapeutic exercise;Endurance training;Cognitive reorientation;Patient/family training;Equipment eval/education;Bed mobility;Gait training;Spoke to nursing;OT   PT Frequency 3-5x/wk   Discharge Recommendation   Rehab Resource Intensity Level, PT II (Moderate Resource Intensity)   AM-PAC Basic Mobility Inpatient   Turning in Flat Bed Without Bedrails 3   Lying on Back to Sitting on Edge of Flat Bed Without Bedrails 2   Moving Bed to Chair 2   Standing Up From Chair Using Arms 2   Walk in Room 2   Climb 3-5 Stairs With Railing 1   Basic Mobility Inpatient Raw Score 12   Basic Mobility Standardized Score 32.23   [facility] Highest Level Of Mobility   -HLM Goal 4: Move to chair/commode   -HLM Achieved 7: Walk 25 feet or more   Additional Treatment Session   Start Time 1155   End Time 1204   Treatment Assessment Pt was agreeable to treatment session cosisting of LE strengthening exercises. Pt performed exercises with increased VC/TC and demonstrations. Pt required therapeutic rest breaks between exercises d/t fatigue. Pt will be progressed as appropriate while admitted.   Exercises   Hip Adduction Sitting;10 reps;AROM;Bilateral   Knee AROM Long Arc Quad Sitting;10 reps;AROM;Bilateral   Ankle Pumps  AROM;Bilateral;Sitting;20 reps   Marching Sitting;10 reps;AROM;Bilateral   End of Consult   Patient Position at End of Consult Bedside chair;Bed/Chair alarm activated;All needs within reach       Recommendations                                                                                                              Pt will benefit from continued skilled IP PT to address the deficits mentioned above to improve functional mobility, improve tolerance to ADLs/IADLs, improve independence with transfers/ambulation, and maximize rehab potential.See flow sheet for goals and POC.     PT Evaluation Time: 11:32-11:54  PT Treatment Time: 11:55-12:04    Aysha Larson, ELISA         [1]   Patient Active Problem List  Diagnosis    DM (diabetes mellitus) (HCC)    HTN (hypertension)    Pneumonia    Abnormal finding on CT scan    AMS (altered mental status)    COVID-19 virus detected    Hypernatremia    Severe protein-calorie malnutrition (HCC)    Tooth abscess    Sepsis (HCC)    Acute encephalopathy   [2]   Past Medical History:  Diagnosis Date    JULEE (acute kidney injury) (HCC) 04/14/2020    Cancer (HCC)     BREAST    Diabetes mellitus (HCC)     Hypertension    [3]   Past Surgical History:  Procedure Laterality Date    BREAST SURGERY

## 2025-07-01 NOTE — PLAN OF CARE
Problem: INFECTION - ADULT  Goal: Absence or prevention of progression during hospitalization  Description: INTERVENTIONS:  - Assess and monitor for signs and symptoms of infection  - Monitor lab/diagnostic results  - Monitor all insertion sites, i.e. indwelling lines, tubes, and drains  - Monitor endotracheal if appropriate and nasal secretions for changes in amount and color  - [place] appropriate cooling/warming therapies per order  - Administer medications as ordered  - Instruct and encourage patient and family to use good hand hygiene technique  - Identify and instruct in appropriate isolation precautions for identified infection/condition  Outcome: Progressing  Goal: Absence of fever/infection during neutropenic period  Description: INTERVENTIONS:  - Monitor WBC  - Perform strict hand hygiene  - Limit to healthy visitors only  - No plants, dried, fresh or silk flowers with osborn in patient room  Outcome: Progressing     Problem: SAFETY ADULT  Goal: Patient will remain free of falls  Description: INTERVENTIONS:  - Educate patient/family on patient safety including physical limitations  - Instruct patient to call for assistance with activity   - Consider consulting OT/PT to assist with strengthening/mobility based on AM PAC & JH-HLM score  - Consult OT/PT to assist with strengthening/mobility   - Keep Call bell within reach  - Keep bed low and locked with side rails adjusted as appropriate  - Keep care items and personal belongings within reach  - Initiate and maintain comfort rounds  - Make Fall Risk Sign visible to staff  - Offer Toileting every 2 Hours, in advance of need  - Initiate/Maintain bed alarm  - Obtain necessary fall risk management equipment: yellow socks  - Apply yellow socks and bracelet for high fall risk patients  - Consider moving patient to room near nurses station  Outcome: Progressing  Goal: Maintain or return to baseline ADL function  Description: INTERVENTIONS:  -  Assess patient's  ability to carry out ADLs; assess patient's baseline for ADL function and identify physical deficits which impact ability to perform ADLs (bathing, care of mouth/teeth, toileting, grooming, dressing, etc.)  - Assess/evaluate cause of self-care deficits   - Assess range of motion  - Assess patient's mobility; develop plan if impaired  - Assess patient's need for assistive devices and provide as appropriate  - Encourage maximum independence but intervene and supervise when necessary  - Involve family in performance of ADLs  - Assess for home care needs following discharge   - Consider OT consult to assist with ADL evaluation and planning for discharge  - Provide patient education as appropriate  - Monitor functional capacity and physical performance, use of AM PAC & JH-HLM   - Monitor gait, balance and fatigue with ambulation    Outcome: Progressing  Goal: Maintains/Returns to pre admission functional level  Description: INTERVENTIONS:  - Perform AM-PAC 6 Click Basic Mobility/ Daily Activity assessment daily.  - Set and communicate daily mobility goal to care team and patient/family/caregiver.   - Collaborate with rehabilitation services on mobility goals if consulted  - Perform Range of Motion 2 times a day.  - Reposition patient every 2 hours.  - Dangle patient 2 times a day  - Stand patient 2 times a day  - Ambulate patient 2 times a day  - Out of bed to chair 2 times a day   - Out of bed for meals 2 times a day  - Out of bed for toileting  - Record patient progress and toleration of activity level   Outcome: Progressing     Problem: DISCHARGE PLANNING  Goal: Discharge to home or other facility with appropriate resources  Description: INTERVENTIONS:  - Identify barriers to discharge w/patient and caregiver  - Arrange for needed discharge resources and transportation as appropriate  - Identify discharge learning needs (meds, wound care, etc.)  - Arrange for interpretive services to assist at discharge as needed  -  Refer to Case Management Department for coordinating discharge planning if the patient needs post-hospital services based on physician/advanced practitioner order or complex needs related to functional status, cognitive ability, or social support system  Outcome: Progressing

## 2025-07-01 NOTE — ASSESSMENT & PLAN NOTE
Noted to have right lower lobe pneumonia secondary to aspiration as evidenced by chest x-ray finding  Continue IV ceftriaxone and azithromycin D# 5  If continues to improve possible transition to p.o. antibiotics tomorrow  Speech therapy recommendation dysphagia level 3 diet with thin liquids  Aspiration precautions  Follow-up with PCP for repeat chest x-ray to assess resolution

## 2025-07-01 NOTE — ASSESSMENT & PLAN NOTE
Lab Results   Component Value Date    HGBA1C 10.2 (H) 06/30/2025       Recent Labs     06/30/25  1536 06/30/25  2154 07/01/25  0644 07/01/25  1104   POCGLU 248* 205* 212* 215*       Blood Sugar Average: Last 72 hrs:  (P) [id number]     Updated hemoglobin A1c 10.2  Hold PO agents  SSI with Accu-Cheks  Carb controlled diet  Hypoglycemia protocol  Ambulatory referral to endocrinology

## 2025-07-01 NOTE — CASE MANAGEMENT
Case Management Discharge Planning Note    Patient name Ariella Shepherd  Location /-01 MRN [id number]  : 1945 Date 2025       Current Admission Date: 2025  Current Admission Diagnosis:Sepsis (HCC)   Patient Active Problem List    Diagnosis Date Noted    Acute encephalopathy 2025    Sepsis (HCC) 2025    Tooth abscess 2020    Severe protein-calorie malnutrition (HCC) 2020    Hypernatremia 2020    COVID-19 virus detected 2020    DM (diabetes mellitus) (HCC) 2020    HTN (hypertension) 2020    Pneumonia 2020    JULEE (acute kidney injury) (HCC) 2020    Abnormal finding on CT scan 2020    AMS (altered mental status) 2020      LOS (days): 3  Geometric Mean LOS (GMLOS) (days): 4.9  Days to GMLOS:2     OBJECTIVE:  Risk of Unplanned Readmission Score: 11.43         Current admission status: Inpatient   Preferred Pharmacy:   PATIENT/FAMILY REPORTS NO PREFERRED PHARMACY  No address on file      Primary Care Provider: No primary care provider on file.    Primary Insurance: MEDICARE MISC REPLACEMENT  Secondary Insurance:     DISCHARGE DETAILS:    Discharge planning discussed with:: daughter  Freedom of Choice: Yes  Comments - Freedom of Choice: CM left voicemail for son and spoke with daughter on the phone stating that patient is going to be discharged today and if they want to consider STR or HHC. Daughter stated that herself and her 2 brothers all take care of their mother and they are declining STR and HHC. She said that her brother is setting up HHC for the pt in [place]. SLIM aware. No CM needs anticipated.  CM contacted family/caregiver?: Yes  Were Treatment Team discharge recommendations reviewed with patient/caregiver?: Yes  Did patient/caregiver verbalize understanding of patient care needs?: Yes       Contacts  Patient Contacts: carolyn  Relationship to Patient:: Family  Contact Method: Phone  Phone Number: see  faceheet  Reason/Outcome: Continuity of Care, Discharge Planning    Requested Home Health Care         Is the patient interested in HHC at discharge?: No

## 2025-07-01 NOTE — DISCHARGE SUMMARY
Discharge Summary - Hospitalist   Name: Ariella Shepherd 80 y.o. female I MRN: [id number]  Unit/Bed#: -01 I Date of Admission: 6/27/2025   Date of Service: 7/1/2025 I Hospital Day: 3     Assessment & Plan  Sepsis (HCC)  Presented with fever 100.9, procal 9, tachycardia, elevated lactate of 2.3 suspect possibly from RLL PNA as noted on CXR  Continue ceftriaxone and azithromycin  Clinically improving however still with intermittent fevers  WBC count within normal limits  Blood cultures thus far negative  Received ceftriaxone azithromycin x 5 days  Lactate is since normalized  Procalcitonin 9.50-> 16.40-> 6.96.  Monitor WBC count  DM (diabetes mellitus) ([facility])  Lab Results   Component Value Date    HGBA1C 10.2 (H) 06/30/2025       Recent Labs     06/30/25  1536 06/30/25  2154 07/01/25  0644 07/01/25  1104   POCGLU 248* 205* 212* 215*       Blood Sugar Average: Last 72 hrs:  (P) [id number]     Updated hemoglobin A1c 10.2  Hold PO agents  SSI with Accu-Cheks  Carb controlled diet  Hypoglycemia protocol  Ambulatory referral to endocrinology  HTN (hypertension)  Bp controlled  Hold hctz, arb in setting of julee  Hydrlazine as needed  JULEE resolved.  Creatinine today at baseline 1.0  Resume PTA BP medications  Diovan -12.5 mg substituted with losartan HCTZ  JULEE (acute kidney injury) ([facility]) (Resolved: 7/1/2025)  Baseline cr 1  Creatinine noted to be 1.36  Suspect from fever/sepsis  Has since resolved with IV fluids  Encourage oral hydration  Pneumonia  Noted to have right lower lobe pneumonia secondary to aspiration as evidenced by chest x-ray finding  Continue IV ceftriaxone and azithromycin D# 5  If continues to improve possible transition to p.o. antibiotics tomorrow  Speech therapy recommendation dysphagia level 3 diet with thin liquids  Aspiration precautions  Follow-up with PCP for repeat chest x-ray to assess resolution  Acute encephalopathy  Presented with lethargy, somnolence  Secondary to  pneumonia/sepsis  Has since significantly improved with IV antibiotics now mental status back to baseline     Medical Problems       Resolved Problems  Date Reviewed: 6/30/2025          Resolved    JULEE (acute kidney injury) (HCC) 7/1/2025     Resolved by  ASHLEY Leon        Discharging Physician / Practitioner: ASHLEY Leon  PCP: No primary care provider on file.  Admission Date:   Admission Orders (From admission, onward)       Ordered        06/28/25 1243  INPATIENT ADMISSION  Once            06/27/25 1755  Place in Observation  Once                          Discharge Date: 07/01/25    Next Steps for Physician/AP Assuming Care:  Ambulatory referral to Endocrinology  PCP to repeat chest x-ray to monitor for resolution of pneumonia        Medication Changes for Discharge & Rationale:     See after visit summary for reconciled discharge medications provided to patient and/or family.     Consultations During Hospital Stay:  None    Procedures Performed:   Chest X-ray: Consolidation in the right lung base compatible with pneumonia. Suggest follow-up to ensure resolution after treatment     Significant Findings / Test Results:   Noted above      Hospital Course:   Ariella Shepherd is a 80 y.o. female patient who originally presented to the hospital on 6/27/2025 due to fever 100.9, tachycardia and elevated lactate at 2.3 which has since resolved and also procalcitonin elevated  Chest x-ray with findings as noted above.  Was initiated on ceftriaxone and azithromycin IV and she received 5 days total of antibiotic.  Blood cultures negative till date.  Noted with JULEE on presentation which improved with IV fluids.  Speech therapy consulted for dysphagia and recommended soft/level 3 diet and thin liquids.  Ambulatory referral to Endocrinology for HG A1c 10.2.  Spoke with daughter Nighat and all questions were answered to her satisfaction.          Please see above list of diagnoses and related plan for  "additional information.     Discharge Day Visit / Exam:     Subjective:    Seen and examined sitting up in bed.  Patient reports feeling okay.  No sign of distress noted.  Denies any pain, shortness of breath, lightheadedness, nausea or vomiting.    Vitals: Blood Pressure: (!) 172/83 (07/01/25 1134)  Pulse: 65 (07/01/25 1134)  Temperature: 97.9 °F (36.6 °C) (06/30/25 2205)  Temp Source: Oral (06/30/25 1545)  Respirations: 21 (07/01/25 0749)  Height: 5' 2\" (157.5 cm) (06/27/25 2300)  Weight - Scale: 55 kg (121 lb 4.1 oz) (06/27/25 2300)  SpO2: 96 % (07/01/25 1134)  Physical Exam  Vitals and nursing note reviewed.   Constitutional:       General: She is not in acute distress.     Appearance: She is well-developed.   HENT:      Head: Normocephalic and atraumatic.     Eyes:      Conjunctiva/sclera: Conjunctivae normal.       Cardiovascular:      Rate and Rhythm: Normal rate and regular rhythm.      Heart sounds: No murmur heard.  Pulmonary:      Effort: Pulmonary effort is normal. No respiratory distress.   Abdominal:      Palpations: Abdomen is soft.      Tenderness: There is no abdominal tenderness.     Musculoskeletal:         General: No swelling.      Cervical back: Neck supple.     Skin:     General: Skin is warm and dry.      Capillary Refill: Capillary refill takes less than 2 seconds.     Neurological:      Mental Status: She is alert.     Psychiatric:         Mood and Affect: Mood normal.          Discussion with Family: Updated  (daughter) via phone.    Discharge instructions/Information to patient and family:   See after visit summary for information provided to patient and family.      Provisions for Follow-Up Care:  See after visit summary for information related to follow-up care and any pertinent home health orders.      Mobility at time of Discharge:   Basic Mobility Inpatient Raw Score: 13  JH-HLM Goal: 4: Move to chair/commode  JH-HLM Achieved: 2: Bed activities/Dependent transfer  HLM " Goal achieved. Continue to encourage appropriate mobility.     Disposition:   Home    Planned Readmission: No    Administrative Statements   Discharge Statement:  I have spent a total time of  minutes in caring for this patient on the day of the visit/encounter. .    **Please Note: This note may have been constructed using a voice recognition system**

## 2025-07-01 NOTE — ASSESSMENT & PLAN NOTE
Presented with fever 100.9, procal 9, tachycardia, elevated lactate of 2.3 suspect possibly from RLL PNA as noted on CXR  Continue ceftriaxone and azithromycin  Clinically improving however still with intermittent fevers  WBC count within normal limits  Blood cultures thus far negative  Received ceftriaxone azithromycin x 5 days  Lactate is since normalized  Procalcitonin 9.50-> 16.40-> 6.96.  Monitor WBC count

## 2025-07-01 NOTE — PLAN OF CARE
"  Problem: OCCUPATIONAL THERAPY ADULT  Goal: Performs self-care activities at highest level of function for planned discharge setting.  See evaluation for individualized goals.  Description: Treatment Interventions: ADL retraining, Functional transfer training, UE strengthening/ROM, Endurance training, Cognitive reorientation, Patient/family training, Equipment evaluation/education, Fine motor coordination activities, Compensatory technique education, Continued evaluation, Energy conservation, Activityengagement          See flowsheet documentation for full assessment, interventions and recommendations.   Note: Limitation: Decreased ADL status, Decreased UE strength, Decreased Safe judgement during ADL, Decreased cognition, Decreased endurance, Decreased self-care trans, Decreased fine motor control, Decreased high-level ADLs  Prognosis: Good  Assessment: Patient is a 80 y.o. female seen for OT evaluation s/p admit to [facility] on 6/27/2025 w/Sepsis ([facility]). Commorbidities affecting patient's functional performance at time of assessment include: Sepsis, DM, HTN,JULEE, presented to ED with fevers, chills and body aches. Orders placed for OT evaluation and treatment.  Performed at least two patient identifiers during session including name and wristband.  Prior to admission, Patient unable to provide information related to PLIOF or home set up. Chart review indicates: \"Pt recently moved from [place] and has been staying with daughter Nighat and son James in a one story house with 2 DIONISIO. Family assists pt on stairs and recently have been assisting with showering and ADL's. She uses no DME's\" . Personal factors affecting patient at time of initial evaluation include: steps to enter, limited insight into deficits, decreased initiation and engagement, difficulty performing ADLs, and difficulty performing IADLs. Upon evaluation, patient requires moderate assist for UB ADLs, maximal assist for LB ADLs, transfers and " functional ambulation in room and bathroom with maximal assist, with the use of Rolling Walker.  Occupational performance is affected by the following deficits: orientation, decreased functional use of BUEs, in hand manipulation deficit with impaired reach, grasp and coordination, decreased muscle strength, degenerative arthritic joint changes, impaired gross motor coordination, impaired fine motor coordination, dynamic sit/ stand balance deficit with poor standing tolerance time for self care and functional mobility, decreased activity tolerance, impaired memory, impaired problem solving, decreased safety awareness, and postural control and postural alignment deficit, requiring external assistance to complete transitional movements.  Patient to benefit from continued Occupational Therapy treatment while in the hospital to address deficits as defined above and maximize level of functional independence with ADLs and functional mobility. Occupational Performance areas to address include: bathing/ shower, dressing, toilet hygiene, transfer to all surfaces, functional mobility, health maintenance, and Leisure Participation. From OT standpoint, recommendation at time of d/c would be Level 2.     Rehab Resource Intensity Level, OT: II (Moderate Resource Intensity)

## 2025-07-01 NOTE — CASE MANAGEMENT
Case Management Discharge Planning Note    Patient name Ariella Shepherd  Location /-01 MRN [id number]  : 1945 Date 2025       Current Admission Date: 2025  Current Admission Diagnosis:Sepsis (HCC)   Patient Active Problem List    Diagnosis Date Noted    Acute encephalopathy 2025    Sepsis (HCC) 2025    Tooth abscess 2020    Severe protein-calorie malnutrition (HCC) 2020    Hypernatremia 2020    COVID-19 virus detected 2020    DM (diabetes mellitus) (HCC) 2020    HTN (hypertension) 2020    Pneumonia 2020    Abnormal finding on CT scan 2020    AMS (altered mental status) 2020      LOS (days): 3  Geometric Mean LOS (GMLOS) (days): 4.9  Days to GMLOS:1.8     OBJECTIVE:  Risk of Unplanned Readmission Score: 11.59         Current admission status: Inpatient   Preferred Pharmacy:   PATIENT/FAMILY REPORTS NO PREFERRED PHARMACY  No address on file      Primary Care Provider: No primary care provider on file.    Primary Insurance: MEDICARE MISC REPLACEMENT  Secondary Insurance:     DISCHARGE DETAILS:    Requested Home Health Care         Is the patient interested in HHC at discharge?: No    DME Referral Provided  Referral made for DME?: No    Other Referral/Resources/Interventions Provided:  Interventions: Declines resources  Referral Comments: Family is refusing STR and HHC.    Would you like to participate in our [facility] service program?  : No - Declined    Treatment Team Recommendation: Home  Expected Discharge Disposition: Home or Self Care  Additional Discharge Dispositions: Home or Self Care  Transport at Discharge : Family

## 2025-07-02 LAB
ATRIAL RATE: 84 BPM
BACTERIA BLD CULT: NORMAL
BACTERIA BLD CULT: NORMAL
P AXIS: 84 DEGREES
PR INTERVAL: 162 MS
QRS AXIS: 63 DEGREES
QRSD INTERVAL: 92 MS
QT INTERVAL: 380 MS
QTC INTERVAL: 450 MS
T WAVE AXIS: 65 DEGREES
VENTRICULAR RATE: 84 BPM

## 2025-07-02 PROCEDURE — 93010 ELECTROCARDIOGRAM REPORT: CPT | Performed by: INTERNAL MEDICINE

## 2025-07-03 NOTE — UTILIZATION REVIEW
NOTIFICATION OF INPATIENT ADMISSION   AUTHORIZATION REQUEST   SERVICING FACILITY:   [place] [address]  Tax ID: [id number]  NPI: [id number] ATTENDING PROVIDER:  Attending Name and NPI#: Zachary Fitzgerald Md [[id number]]  Address: [address] [place] [address]  Phone: [phone]     ADMISSION INFORMATION:  Place of Service: Inpatient [facility]  Place of Service Code: 21  Inpatient Admission Date/Time: 6/28/25 12:44 PM  Discharge Date/Time: 7/1/2025  2:55 PM  Admitting Diagnosis Code/Description:  Pneumonia [J18.9]  Flu-like symptoms [R68.89]     UTILIZATION REVIEW CONTACT:  Julianna Wong, Utilization   Network Utilization Review Department  Phone: [phone]  Fax [phone]  Email: [email or web address]  Contact for approvals/pending authorizations, clinical reviews, and discharge.     PHYSICIAN ADVISORY SERVICES:  Medical Necessity Denial & Ecqg-xt-Rjwa Review  Phone: [phone]  Fax: [phone]  Email: [email or web address]     DISCHARGE SUPPORT TEAM:  For Patients Discharge Needs & Updates  Phone: [phone] opt. [phone] Fax: [phone]  Email: [email or web address]     Initial Clinical Review - OBS 6/27/25, Inpatient 6/28/25 for continued treatment of sepsis with IV antibiotics.      Admission: Date/Time/Statement:   Admission Orders (From admission, onward)          Ordered         06/28/25 1243   INPATIENT ADMISSION  Once             06/27/25 1755   Place in Observation  Once                                     Orders Placed This Encounter   Procedures    INPATIENT ADMISSION       Standing Status:   Standing       Number of Occurrences:   1       Level of Care:   Med Surg [16]       Estimated length of stay:   More than 2 Midnights       Certification:   I certify that inpatient services are medically necessary for this patient for a duration of greater than two  midnights. See H&P and MD Progress Notes for additional information about the patient's course of treatment.      ED Arrival Information         Expected   -    Arrival   6/27/2025 16:13    Acuity   Urgent                 Means of arrival   Wheelchair    Escorted by   Family Member    Service   Hospitalist    Admission type   Emergency                 Arrival complaint   Flu Like Symptoms                     Chief Complaint   Patient presents with    Flu Symptoms       Pt c/o fever, chills and body aches that started yesterday          Initial Presentation: 79 y.o. female with PMH of HTN, DM who initally presented with fevers, chills and bodyaches that began  yesterday. Fever of 100.9 on arrival to the ED.  ED CXR revealed consolidation in the right lung base compatible with pneumonia. Abnormal labs:  Lactic acid 2.6, procalcitonin 9.5. Creatinine 1.36 (baseline 1), elevated glucose of 344.  Patient received IVF and SC insulin in the ED.  Exam:  Tachycardia. Normal breath sounds.      6/27 Admit to Observation for evaluation and treatment of sepsis, suspect possibly from RLL pneumonia, DM, JULEE:  Ceftriaxone and azithromycin, follow blood cultures, trend lactic acid.  Hold PO agents, SSI, monitor blood glucose.  IVF, repeat BMP in AM.      6/27 11:51 PM: RN reported that pt keeping saliva in her mouth and given signs of choking/aspiration risk. Will keep the pt NPO with SLP consult.      6/28 Internal Medicine: Lactate normalized, continue antibiotics,  follow up blood cultures, monitor WBC. BP controlled, holding HCTZ, ARB in setting of JULEE.  Creatinine improving, continue IVF for now. Certification Statement: The patient will continue to require additional inpatient hospital stay due to requiring IV antibiotics. Speech Bedside Swallow eval:  s/s suggestive of mild-moderate oral dysphagia c/w dementia presentation. Recommendation:  soft/level 3 diet and thin liquids. Medications as tolerated.      6/29 Day 3: Has  surpassed a 2nd midnight with active treatments and services. Internal Medicine: Clinically improving, however, still with intermittent fever. WBC WNL, blood cultures negative thus far. Continue ceftriaxone and azithromycin for now, if continues to improve, possible transition to PO antibiotics tomorrow.  JULEE resolved with IVF, encourage oral hydration.         ED Treatment-Medication Administration from 06/27/2025 1613 to 06/27/2025 1947           Date/Time Order Dose Route Action       06/27/2025 1711 sodium chloride 0.9 % bolus 1,000 mL 1,000 mL Intravenous New Bag       06/27/2025 1926 insulin lispro (HumALOG/ADMELOG) 100 units/mL subcutaneous injection 1-5 Units 3 Units Subcutaneous Given                Scheduled Medications:        azithromycin, 500 mg, Intravenous, Q24H  cefTRIAXone, 1,000 mg, Intravenous, Q24H  heparin (porcine), 5,000 Units, Subcutaneous, Q8H SNOW  insulin lispro, 1-5 Units, Subcutaneous, TID AC  insulin lispro, 1-5 Units, Subcutaneous, HS  pravastatin, 40 mg, Oral, Daily With Dinner        Continuous IV Infusions:     sodium chloride, 75 mL/hr, Intravenous, Continuous        PRN Meds:     acetaminophen, 650 mg, Oral, Q6H PRN  benzonatate, 100 mg, Oral, TID PRN                ED Triage Vitals   Temperature Pulse Respirations Blood Pressure SpO2 Pain Score   06/27/25 1618 06/27/25 1618 06/27/25 1618 06/27/25 1618 06/27/25 1618 06/27/25 2300   (!) 100.9 °F (38.3 °C) 94 20 160/75 99 % No Pain      Weight (last 2 days)         Date/Time Weight     06/27/25 2300 55 (121.25)     06/27/25 2000 55 (121.25)     06/27/25 1943 54.4 (120)                Vital Signs (last 3 days)         Date/Time Temp Pulse Resp BP MAP (mmHg) SpO2 O2 Device Patient Position - Orthostatic VS Yue Coma Scale Score Pain     06/29/25 1107 -- -- -- -- -- -- -- -- -- No Pain     06/29/25 07:25:41 100.5 °F (38.1 °C) 82 -- 141/66 91 94 % -- -- -- --     06/29/25 02:10:48 100.3 °F (37.9 °C) -- -- -- -- -- -- -- -- --      06/28/25 21:41:53 98.1 °F (36.7 °C) 81 -- 134/71 92 94 % -- -- -- --     06/28/25 1947 -- -- -- -- -- -- None (Room air) -- 13 No Pain     06/28/25 1626 -- -- -- -- -- -- -- -- -- Med Not Given for Pain - for MAR use only     06/28/25 15:44:55 102.8 °F (39.3 °C) 94 17 -- -- 95 % -- -- -- --     06/28/25 15:07:45 102.7 °F (39.3 °C) 95 17 133/65 88 95 % -- -- -- --     06/28/25 1004 -- -- -- -- -- -- -- -- 13 No Pain     06/28/25 08:25:54 -- 92 16 129/57 81 95 % -- -- -- --     06/28/25 0300 99.5 °F (37.5 °C) -- -- -- -- -- -- -- -- --     06/27/25 2300 103.2 °F (39.6 °C) 88 18 151/86 -- -- None (Room air) -- 13 No Pain     06/27/25 2000 103.2 °F (39.6 °C) -- 18 -- -- -- None (Room air) Lying -- --     06/27/25 1920 -- -- -- -- -- -- -- -- 13 --     06/27/25 1715 -- 96 18 183/86 124 95 % None (Room air) Lying -- --     06/27/25 1630 -- 103 18 137/70 100 93 % None (Room air) Lying -- --     06/27/25 1618 100.9 °F (38.3 °C) 94 20 160/75 108 99 % None (Room air) Sitting -- --                   Pertinent Labs/Diagnostic Test Results:   Radiology:  XR chest pa & lateral   Final Interpretation by Kg Montanez MD (06/27 9937)       Consolidation in the right lung base compatible with pneumonia. Suggest follow-up to ensure resolution after treatment       The study was marked in EPIC for immediate notification.               Workstation performed: [id number]              Cardiology:  ECG 12 lead   Final Result by Brandon Prieto MD (06/28 0951)   Normal sinus rhythm   Moderate voltage criteria for LVH, may be normal variant   When compared with ECG of 27-Jun-2025 17:14, (unconfirmed)   No significant change was found   Confirmed by Brandon Prieto ([id number]) on 6/28/2025 9:51:46 AM       ECG 12 lead   Final Result by Brandon Prieto MD (06/28 0954)   Normal sinus rhythm   Right atrial enlargement   Borderline ECG   Confirmed by Brandon Prieto ([id number]) on 6/28/2025 9:54:46 AM                        Results from last 7  days   Lab Units 06/28/25  0932   SARS-COV-2   Negative             Results from last 7 days   Lab Units 06/29/25  0502 06/28/25  0558 06/27/25  1707   WBC Thousand/uL 6.23 7.90 8.87   HEMOGLOBIN g/dL 8.1* 9.5* 10.3*   HEMATOCRIT % 24.8* 29.8* 32.6*   PLATELETS Thousands/uL 143* 146* 151   TOTAL NEUT ABS Thousands/µL 4.69 6.69 7.51                 Results from last 7 days   Lab Units 06/29/25  0502 06/28/25  0558 06/27/25  1707   SODIUM mmol/L 139 137 132*   POTASSIUM mmol/L 3.6 3.8 4.3   CHLORIDE mmol/L 108 102 96   CO2 mmol/L 21 22 23   ANION GAP mmol/L 10 13 13   BUN mg/dL 20 21 23   CREATININE mg/dL 1.10 1.25 1.36*   EGFR ml/min/1.73sq m 48 41 37   CALCIUM mg/dL 8.3* 8.9 9.3           Results from last 7 days   Lab Units 06/27/25  1707   AST U/L 44*   ALT U/L 19   ALK PHOS U/L 111*   TOTAL PROTEIN g/dL 8.0   ALBUMIN g/dL 3.6   TOTAL BILIRUBIN mg/dL 0.55                  Results from last 7 days   Lab Units 06/29/25  1056 06/29/25  0638 06/28/25  2151 06/28/25  1543 06/28/25  1206 06/28/25  0630 06/27/25  2109 06/27/25  1903   POC GLUCOSE mg/dl 301* 223* 254* 308* 203* 190* 232* 302*             Results from last 7 days   Lab Units 06/29/25  0502 06/28/25  0558 06/27/25  1707   GLUCOSE RANDOM mg/dL 232* 191* 344*                        Results from last 7 days   Lab Units 06/27/25  2200 06/27/25  1942 06/27/25  1707   HS TNI 0HR ng/L  --   --  35   HS TNI 2HR ng/L  --  48  --    HSTNI D2 ng/L  --  13  --    HS TNI 4HR ng/L 70*  --   --    HSTNI D4 ng/L 35*  --   --                Results from last 7 days   Lab Units 06/27/25  1707   PROTIME seconds 15.0   INR   1.11   PTT seconds 29                Results from last 7 days   Lab Units 06/28/25  0558 06/27/25  1707   PROCALCITONIN ng/ml 16.40* 9.50*            Results from last 7 days   Lab Units 06/27/25  1942 06/27/25  1707   LACTIC ACID mmol/L 1.2 2.6*                          Results from last 7 days   Lab Units 06/28/25  0542   CLARITY UA   Clear   COLOR UA    Yellow   SPEC GRAV UA   1.022   PH UA   6.0   GLUCOSE UA mg/dl 500 (1/2%)*   KETONES UA mg/dl 20 (1+)*   BLOOD UA   Large*   PROTEIN UA mg/dl 200 (2+)*   NITRITE UA   Negative   BILIRUBIN UA   Negative   UROBILINOGEN UA (BE) mg/dl <2.0   LEUKOCYTES UA   Negative   WBC UA /hpf 4-10*   RBC UA /hpf 1-2   BACTERIA UA /hpf Occasional   EPITHELIAL CELLS WET PREP /hpf Occasional           Results from last 7 days   Lab Units 06/28/25  0932   INFLUENZA A PCR   Negative   INFLUENZA B PCR   Negative   RSV PCR   Negative                       Results from last 7 days   Lab Units 06/27/25  1850 06/27/25  1840   BLOOD CULTURE   No Growth at 24 hrs. No Growth at 24 hrs.                     [Past Medical History]    [Past Medical History]       Diagnosis Date    Cancer (HCC)       BREAST    Diabetes mellitus (HCC)      Hypertension       Present on Admission:   JULEE (acute kidney injury) (HCC)   DM (diabetes mellitus) (HCC)   HTN (hypertension)   Pneumonia        Admitting Diagnosis: Pneumonia [J18.9]  Flu-like symptoms [R68.89]  Age/Sex: 79 y.o. female     Network Utilization Review Department  ATTENTION: Please call with any questions or concerns to [phone] and carefully listen to the prompts so that you are directed to the right person. All voicemails are confidential.   For Discharge needs, contact Care Management DC Support Team at [phone] opt. 2  Send all requests for admission clinical reviews, approved or denied determinations and any other requests to dedicated fax number below belonging to the campus where the patient is receiving treatment. List of dedicated fax numbers for the Facilities:  FACILITY NAME UR FAX NUMBER   ADMISSION DENIALS (Administrative/Medical Necessity) [phone]   DISCHARGE SUPPORT TEAM (NETWORK) [phone]   PARENT CHILD HEALTH (Maternity/NICU/Pediatrics) [phone]   [facility] [phone]   [facility] [phone]   [facility] [phone]   [facility] [phone]   [facility] [phone]   [facility] [phone]   [facility] [phone]   [facility] [phone]   [facility] [phone]   Critical access hospital [phone]   [facility] [phone]   [facility] [phone]

## 2025-07-27 PROBLEM — A41.9 SEPSIS (HCC): Status: RESOLVED | Noted: 2025-06-27 | Resolved: 2025-07-27

## 2025-07-29 PROBLEM — J18.9 PNEUMONIA: Status: RESOLVED | Noted: 2020-04-14 | Resolved: 2025-07-29
